# Patient Record
Sex: MALE | Race: WHITE | NOT HISPANIC OR LATINO | Employment: OTHER | URBAN - METROPOLITAN AREA
[De-identification: names, ages, dates, MRNs, and addresses within clinical notes are randomized per-mention and may not be internally consistent; named-entity substitution may affect disease eponyms.]

---

## 2017-03-26 ENCOUNTER — HOSPITAL ENCOUNTER (EMERGENCY)
Facility: HOSPITAL | Age: 72
Discharge: HOME | End: 2017-03-26
Attending: EMERGENCY MEDICINE | Admitting: EMERGENCY MEDICINE

## 2017-03-26 ENCOUNTER — APPOINTMENT (OUTPATIENT)
Dept: GENERAL RADIOLOGY | Facility: HOSPITAL | Age: 72
End: 2017-03-26

## 2017-03-26 VITALS
WEIGHT: 243.31 LBS | HEIGHT: 72 IN | RESPIRATION RATE: 22 BRPM | SYSTOLIC BLOOD PRESSURE: 164 MMHG | DIASTOLIC BLOOD PRESSURE: 106 MMHG | OXYGEN SATURATION: 100 % | TEMPERATURE: 98.3 F | HEART RATE: 69 BPM | BODY MASS INDEX: 32.95 KG/M2

## 2017-03-26 DIAGNOSIS — Z93.1 STATUS POST INSERTION OF PERCUTANEOUS ENDOSCOPIC GASTROSTOMY (PEG) TUBE (HCC): Primary | ICD-10-CM

## 2017-03-26 PROCEDURE — 74000 HC ABDOMEN KUB: CPT

## 2017-03-26 PROCEDURE — 99283 EMERGENCY DEPT VISIT LOW MDM: CPT

## 2017-03-26 PROCEDURE — 99283 EMERGENCY DEPT VISIT LOW MDM: CPT | Performed by: EMERGENCY MEDICINE

## 2017-03-26 RX ORDER — ALBUTEROL SULFATE 2.5 MG/3ML
2.5 SOLUTION RESPIRATORY (INHALATION)
COMMUNITY
End: 2018-08-29

## 2017-03-26 RX ORDER — LACTULOSE 10 G/15ML
20 SOLUTION ORAL DAILY PRN
COMMUNITY
End: 2019-08-16 | Stop reason: HOSPADM

## 2017-03-26 RX ORDER — ERGOCALCIFEROL (VITAMIN D2) 10 MCG
800 TABLET ORAL DAILY
COMMUNITY
End: 2018-08-29

## 2017-03-26 RX ORDER — ACETAMINOPHEN 500 MG
500 TABLET ORAL EVERY 4 HOURS PRN
COMMUNITY
End: 2019-08-16 | Stop reason: HOSPADM

## 2017-03-26 RX ORDER — AMIODARONE HYDROCHLORIDE 200 MG/1
100 TABLET ORAL 2 TIMES DAILY
COMMUNITY
End: 2018-08-29

## 2017-03-26 RX ORDER — AMOXICILLIN AND CLAVULANATE POTASSIUM 250; 125 MG/1; MG/1
1 TABLET, FILM COATED ORAL 3 TIMES DAILY
COMMUNITY
End: 2018-08-29

## 2017-03-26 RX ORDER — ATORVASTATIN CALCIUM 40 MG/1
40 TABLET, FILM COATED ORAL NIGHTLY
COMMUNITY
End: 2019-08-16 | Stop reason: HOSPADM

## 2017-03-26 RX ORDER — MESALAMINE 0.38 G/1
375 CAPSULE, EXTENDED RELEASE ORAL DAILY
COMMUNITY
End: 2018-08-29

## 2017-03-26 RX ORDER — IRON POLYSACCHARIDE COMPLEX 150 MG
150 CAPSULE ORAL DAILY
COMMUNITY
End: 2019-08-16 | Stop reason: HOSPADM

## 2017-03-27 NOTE — DISCHARGE INSTRUCTIONS
Should any further problems with the PEG to develop, take the patient back to [facility] were the PEG tube was placed originally.

## 2017-03-27 NOTE — ED PROVIDER NOTES
Subjective     History provided by:  Nursing home and medical records    History of Present Illness    · Chief complaint: Bleeding from the PEG tube site and concern if he takes tube is still in place    · Location: Left abdominal wall    · Quality/Severity: Bleeding from the insertion site of the PEG tube    · Timing/Onset: Today    · Modifying Factors: Unknown    · Associated symptoms: The patient is without complaint    · Narrative: The patient is a 72-year-old white male who is status post CVA and underwent PEG tube placement at [facility] March 23 by Dr. Hernandez.  He is at rehabilitation at the [facility] were he was noted to have bleeding from around the PEG tube site.  The nursing home was concerned as to whether the PEG tube was still in place.  The patient is elaborate much on the history and denies any pain.    ED Triage Vitals   Temp Heart Rate Resp BP SpO2   03/26/17 1759 03/26/17 1759 03/26/17 1759 03/26/17 1759 03/26/17 1759   98.3 °F (36.8 °C) 69 22 110/73 94 %      Temp src Heart Rate Source Patient Position BP Location FiO2 (%)   03/26/17 1759 03/26/17 1759 03/26/17 1759 03/26/17 1759 --   Oral Monitor Lying Right arm        Review of Systems   Reason unable to perform ROS: The patient is status post CVA and has expressive aphasia.       Past Medical History:   Diagnosis Date   • Acute kidney injury    • Aspiration pneumonia    • Atrial fibrillation    • Encephalopathy acute    • Leukocytosis    • LV dysfunction    • Septic shock    • Stroke    • Syncope        No Known Allergies    Past Surgical History:   Procedure Laterality Date   • CARDIAC SURGERY     • GTUBE INSERTION         History reviewed. No pertinent family history.    Social History     Social History   • Marital status: Single     Spouse name: N/A   • Number of children: N/A   • Years of education: N/A     Social History Main Topics   • Smoking status: Unknown If Ever Smoked   • Smokeless tobacco: None   •  Alcohol use No   • Drug use: No   • Sexual activity: Defer     Other Topics Concern   • None     Social History Narrative   • None           Objective   Physical Exam   Constitutional: He appears well-developed and well-nourished. No distress.   The patient appears in no acute distress and does not appear in any discomfort.   HENT:   Head: Normocephalic and atraumatic.   Right Ear: External ear normal.   Left Ear: External ear normal.   Nose: Nose normal.   Mouth/Throat: Oropharynx is clear and moist.   Eyes: Conjunctivae and EOM are normal. Pupils are equal, round, and reactive to light. No scleral icterus.   Neck: Normal range of motion. Neck supple.   Cardiovascular: Normal rate and regular rhythm.    No murmur heard.  Pulmonary/Chest: Effort normal and breath sounds normal. No respiratory distress.   Abdominal: Soft. Bowel sounds are normal. There is no tenderness.   There is dried blood around the PEG tube site and soaking the gauze around the PEG tube, but no active bleeding.  He has ecchymosis of the right side of the abdomen that appears subacute.  His abdomen is nontender in his bowel sounds are normal active.   Musculoskeletal: He exhibits no tenderness or deformity.   Lymphadenopathy:     He has no cervical adenopathy.   Neurological: He is alert.   Follows commands and attempts to answer questions with aphasia   Skin: Skin is warm and dry. No rash noted. He is not diaphoretic. No erythema.   Psychiatric: He has a normal mood and affect.   Nursing note and vitals reviewed.      Procedures         ED Course  ED Course   Comment By Time   Dried blood from the G-tube site was present but no active bleeding noted. Juan Pardo MD 03/26 2027                  MDM  Number of Diagnoses or Management Options  Status post insertion of percutaneous endoscopic gastrostomy (PEG) tube: new and requires workup     Amount and/or Complexity of Data Reviewed  Tests in the radiology section of CPT®: ordered and  reviewed  Independent visualization of images, tracings, or specimens: yes    Risk of Complications, Morbidity, and/or Mortality  Presenting problems: moderate  Diagnostic procedures: moderate  Management options: moderate    Patient Progress  Patient progress: improved      Final diagnoses:   Status post insertion of percutaneous endoscopic gastrostomy (PEG) tube           Labs Reviewed - No data to display  XR Abdomen KUB   ED Interpretation   Gastrografin in stomach.  G-tube in proper position.  Per my   interpretation and Dr. Reyez radiologist             Medication List      Notice     No changes were made to your prescriptions during this visit.             Juan Pardo MD  03/27/17 0019

## 2017-03-27 NOTE — ED NOTES
Pt g-tube site cleaned no new blood noted. 4x4 dressing applied. Upon discharge no blding from G-tube neva Jimenez RN  03/26/17 1319

## 2017-04-21 ENCOUNTER — LAB REQUISITION (OUTPATIENT)
Dept: LAB | Facility: HOSPITAL | Age: 72
End: 2017-04-21

## 2017-04-21 DIAGNOSIS — D64.9 ANEMIA: ICD-10-CM

## 2017-04-21 LAB
BASOPHILS # BLD AUTO: 0.05 10*3/MM3 (ref 0–0.2)
BASOPHILS NFR BLD AUTO: 0.4 % (ref 0–2)
DEPRECATED RDW RBC AUTO: 58.9 FL (ref 37–54)
EOSINOPHIL # BLD AUTO: 0.05 10*3/MM3 (ref 0.1–0.3)
EOSINOPHIL NFR BLD AUTO: 0.4 % (ref 0–4)
ERYTHROCYTE [DISTWIDTH] IN BLOOD BY AUTOMATED COUNT: 16.8 % (ref 11.5–14.5)
HCT VFR BLD AUTO: 32.5 % (ref 42–52)
HGB BLD-MCNC: 10.3 G/DL (ref 14–18)
IMM GRANULOCYTES # BLD: 0.1 10*3/MM3 (ref 0–0.03)
IMM GRANULOCYTES NFR BLD: 0.7 % (ref 0–0.5)
LYMPHOCYTES # BLD AUTO: 2.21 10*3/MM3 (ref 0.6–4.8)
LYMPHOCYTES NFR BLD AUTO: 16.5 % (ref 20–45)
MCH RBC QN AUTO: 30.9 PG (ref 27–31)
MCHC RBC AUTO-ENTMCNC: 31.7 G/DL (ref 31–37)
MCV RBC AUTO: 97.6 FL (ref 80–94)
MONOCYTES # BLD AUTO: 0.81 10*3/MM3 (ref 0–1)
MONOCYTES NFR BLD AUTO: 6 % (ref 3–8)
NEUTROPHILS # BLD AUTO: 10.18 10*3/MM3 (ref 1.5–8.3)
NEUTROPHILS NFR BLD AUTO: 76 % (ref 45–70)
NRBC BLD MANUAL-RTO: 0 /100 WBC (ref 0–0)
PLATELET # BLD AUTO: 334 10*3/MM3 (ref 140–500)
PMV BLD AUTO: 11.3 FL (ref 7.4–10.4)
RBC # BLD AUTO: 3.33 10*6/MM3 (ref 4.7–6.1)
WBC NRBC COR # BLD: 13.4 10*3/MM3 (ref 4.8–10.8)

## 2017-04-21 PROCEDURE — 85025 COMPLETE CBC W/AUTO DIFF WBC: CPT | Performed by: INTERNAL MEDICINE

## 2017-05-25 ENCOUNTER — HOSPITAL ENCOUNTER (EMERGENCY)
Facility: HOSPITAL | Age: 72
Discharge: HOME | End: 2017-05-25
Attending: EMERGENCY MEDICINE | Admitting: EMERGENCY MEDICINE

## 2017-05-25 VITALS
SYSTOLIC BLOOD PRESSURE: 156 MMHG | HEART RATE: 96 BPM | WEIGHT: 227 LBS | HEIGHT: 72 IN | RESPIRATION RATE: 16 BRPM | TEMPERATURE: 98.8 F | BODY MASS INDEX: 30.75 KG/M2 | DIASTOLIC BLOOD PRESSURE: 84 MMHG | OXYGEN SATURATION: 95 %

## 2017-05-25 DIAGNOSIS — D64.9 ANEMIA, UNSPECIFIED TYPE: Primary | ICD-10-CM

## 2017-05-25 LAB
ALBUMIN SERPL-MCNC: 3.7 G/DL (ref 3.5–5.2)
ALBUMIN/GLOB SERPL: 1.2 G/DL
ALP SERPL-CCNC: 81 U/L (ref 40–129)
ALT SERPL W P-5'-P-CCNC: 22 U/L (ref 5–41)
ANION GAP SERPL CALCULATED.3IONS-SCNC: 11.7 MMOL/L
APTT PPP: 27.9 SECONDS (ref 24.3–38.1)
AST SERPL-CCNC: 19 U/L (ref 5–40)
BASOPHILS # BLD AUTO: 0.04 10*3/MM3 (ref 0–0.2)
BASOPHILS NFR BLD AUTO: 0.3 % (ref 0–2)
BILIRUB SERPL-MCNC: 0.3 MG/DL (ref 0.2–1.2)
BUN BLD-MCNC: 20 MG/DL (ref 8–23)
BUN/CREAT SERPL: 21.5 (ref 7–25)
CALCIUM SPEC-SCNC: 8.7 MG/DL (ref 8.8–10.5)
CHLORIDE SERPL-SCNC: 94 MMOL/L (ref 98–107)
CO2 SERPL-SCNC: 24.3 MMOL/L (ref 22–29)
CREAT BLD-MCNC: 0.93 MG/DL (ref 0.76–1.27)
DEPRECATED RDW RBC AUTO: 50.4 FL (ref 37–54)
EOSINOPHIL # BLD AUTO: 0.05 10*3/MM3 (ref 0.1–0.3)
EOSINOPHIL NFR BLD AUTO: 0.4 % (ref 0–4)
ERYTHROCYTE [DISTWIDTH] IN BLOOD BY AUTOMATED COUNT: 15.1 % (ref 11.5–14.5)
GFR SERPL CREATININE-BSD FRML MDRD: 80 ML/MIN/1.73
GLOBULIN UR ELPH-MCNC: 3.1 GM/DL
GLUCOSE BLD-MCNC: 114 MG/DL (ref 65–99)
HCT VFR BLD AUTO: 26 % (ref 42–52)
HGB BLD-MCNC: 8.1 G/DL (ref 14–18)
IMM GRANULOCYTES # BLD: 0.08 10*3/MM3 (ref 0–0.03)
IMM GRANULOCYTES NFR BLD: 0.6 % (ref 0–0.5)
INR PPP: 1.09 (ref 0.9–1.1)
LYMPHOCYTES # BLD AUTO: 1.07 10*3/MM3 (ref 0.6–4.8)
LYMPHOCYTES NFR BLD AUTO: 7.5 % (ref 20–45)
MCH RBC QN AUTO: 28.6 PG (ref 27–31)
MCHC RBC AUTO-ENTMCNC: 31.2 G/DL (ref 31–37)
MCV RBC AUTO: 91.9 FL (ref 80–94)
MONOCYTES # BLD AUTO: 1.49 10*3/MM3 (ref 0–1)
MONOCYTES NFR BLD AUTO: 10.5 % (ref 3–8)
NEUTROPHILS # BLD AUTO: 11.52 10*3/MM3 (ref 1.5–8.3)
NEUTROPHILS NFR BLD AUTO: 80.7 % (ref 45–70)
NRBC BLD MANUAL-RTO: 0.1 /100 WBC (ref 0–0)
PLATELET # BLD AUTO: 459 10*3/MM3 (ref 140–500)
PMV BLD AUTO: 10.8 FL (ref 7.4–10.4)
POTASSIUM BLD-SCNC: 4.1 MMOL/L (ref 3.5–5.2)
PROT SERPL-MCNC: 6.8 G/DL (ref 6–8.5)
PROTHROMBIN TIME: 14.1 SECONDS (ref 12.1–15)
RBC # BLD AUTO: 2.83 10*6/MM3 (ref 4.7–6.1)
SODIUM BLD-SCNC: 130 MMOL/L (ref 136–145)
WBC NRBC COR # BLD: 14.25 10*3/MM3 (ref 4.8–10.8)

## 2017-05-25 PROCEDURE — 99284 EMERGENCY DEPT VISIT MOD MDM: CPT

## 2017-05-25 PROCEDURE — 85610 PROTHROMBIN TIME: CPT | Performed by: EMERGENCY MEDICINE

## 2017-05-25 PROCEDURE — 99282 EMERGENCY DEPT VISIT SF MDM: CPT | Performed by: EMERGENCY MEDICINE

## 2017-05-25 PROCEDURE — 80053 COMPREHEN METABOLIC PANEL: CPT | Performed by: EMERGENCY MEDICINE

## 2017-05-25 PROCEDURE — 85025 COMPLETE CBC W/AUTO DIFF WBC: CPT | Performed by: EMERGENCY MEDICINE

## 2017-05-25 PROCEDURE — 85730 THROMBOPLASTIN TIME PARTIAL: CPT | Performed by: EMERGENCY MEDICINE

## 2017-05-25 RX ORDER — DILTIAZEM HYDROCHLORIDE 60 MG/1
60 TABLET, FILM COATED ORAL 3 TIMES DAILY
COMMUNITY
End: 2019-08-16 | Stop reason: HOSPADM

## 2017-05-25 RX ORDER — ESCITALOPRAM OXALATE 20 MG/1
20 TABLET ORAL DAILY
COMMUNITY
End: 2018-08-29

## 2017-05-25 RX ORDER — ASPIRIN 81 MG/1
81 TABLET, CHEWABLE ORAL DAILY
COMMUNITY
End: 2018-08-29

## 2017-05-25 RX ORDER — SODIUM CHLORIDE 0.9 % (FLUSH) 0.9 %
10 SYRINGE (ML) INJECTION AS NEEDED
Status: DISCONTINUED | OUTPATIENT
Start: 2017-05-25 | End: 2017-05-25 | Stop reason: HOSPADM

## 2017-05-25 RX ORDER — ESOMEPRAZOLE MAGNESIUM 40 MG/1
40 FOR SUSPENSION ORAL
COMMUNITY
End: 2018-08-29

## 2017-07-31 ENCOUNTER — TRANSCRIBE ORDERS (OUTPATIENT)
Dept: ADMINISTRATIVE | Facility: HOSPITAL | Age: 72
End: 2017-07-31

## 2017-07-31 DIAGNOSIS — R13.10 DYSPHAGIA, UNSPECIFIED TYPE: Primary | ICD-10-CM

## 2017-08-03 ENCOUNTER — HOSPITAL ENCOUNTER (OUTPATIENT)
Dept: GENERAL RADIOLOGY | Facility: HOSPITAL | Age: 72
Discharge: HOME | End: 2017-08-03
Attending: INTERNAL MEDICINE | Admitting: INTERNAL MEDICINE

## 2017-08-03 DIAGNOSIS — R13.10 DYSPHAGIA, UNSPECIFIED TYPE: Primary | ICD-10-CM

## 2017-08-03 PROCEDURE — 92611 MOTION FLUOROSCOPY/SWALLOW: CPT

## 2017-08-03 PROCEDURE — G8997 SWALLOW GOAL STATUS: HCPCS

## 2017-08-03 PROCEDURE — G8998 SWALLOW D/C STATUS: HCPCS

## 2017-08-03 PROCEDURE — 74230 X-RAY XM SWLNG FUNCJ C+: CPT

## 2017-08-03 PROCEDURE — G8996 SWALLOW CURRENT STATUS: HCPCS

## 2017-08-03 NOTE — MBS/VFSS/FEES
Outpatient Speech Language Pathology   Adult Swallow Initial Evaluation  KIEL Carver     Patient Name: Edson Myers  : 1945  MRN: [id number]  Today's Date: 8/3/2017         Visit Date: 2017   There is no problem list on file for this patient.       Past Medical History:   Diagnosis Date   • Acute kidney injury    • Aspiration pneumonia    • Atrial fibrillation    • Encephalopathy acute    • Leukocytosis    • LV dysfunction    • Septic shock    • Stroke    • Syncope         Past Surgical History:   Procedure Laterality Date   • CARDIAC SURGERY     • GTUBE INSERTION           Visit Dx:     ICD-10-CM ICD-9-CM   1. Dysphagia, unspecified type R13.10 787.20             Adult Dysphagia - 17 0918     Adult Dysphagia Background    Patient Description of Complaint Patient states he would like to get his feeding tube removed.  -AD    Date of Onset 2017  -AD    Symptoms Reported by Patient Other (comment)   Patient does not report any symptoms  -AD    Patient Report of Functional Impact Patient would like to be able to eat food and maintain his nutrition via oral route.  -AD    List Pertinent Medications Atorvastatin, Diltiazem, Donepezil, Escitalopram, Iferex, Nexium UD, Oyster Neil-Vit D, Sulfasalazine, Biscodyl, Calcium antacid, lactulose, Promod, albuterol sulfate.   -AD    History Pertinent to Diagnosis Patient reports that he fell and passed out and was hospitalized. Reports a history of Polio that is causing his swallowing problems and has a feeding tube that he would like to have removed. Patient is noted to be a poor historian and history was obtained from his chart. He was hospitalized with aspiration pneumonia and underwent an MBS that showed severe dysphagia with poor pharyngeal clearance and aspiration/penetration due to poor clearance. Feeding tube was recommended and a PEG tube was placed as the patient did not leave a Dobhoff tube in place. Some concerns with right tracheal  "deviation due to intrinsic pressure from the aorta. This was further evaluated per Dr. Capellan, ENT and here is his assessment \" Assessment/Plan: 1. Dysphagia, evaluated with Xiomara BROOKS, noted to have substantial retention of secretions 2. Consider PEG tube 3. Do not think the innominate vessel near the trachea contributes to this, nor would I recommend any intervention in this area, may wish to get another opinion from vascular surgery regarding this but this is likely long standing and would be a poor idea to intervene in this area. 4. Could consider CP injection of Botox, however would first do extensive swallowing therapy and see how he did, I suspect a general anesthetic would be risky in this patient. Andrade Capellan MD\" Pt also with history significant for chronic left facial droop, Left frontal CVA with encephalomalacia, CABG, GERD, and polio. Pt is a resident of [facility] and has participated in dysphagia therapy and repeat MBS is recommended to assess for tolerance of a po diet.   -AD    Non-Oral Feeding Gastrostomy Tube  -AD    VFSS Exam    Study Completed By SLP and Radiologist (comment)   Dr. Gan  -AD    Textures Presented Thin;Nectar;Honey;Pudding;Solid  -AD    Position During Study/Views Obtained Upright;Lateral View   Sitting in video imaging chair  -AD    Physiologic Impairments Noted on VFSS    Physiologic Impairments Noted on VFSS Did not assess esophageal phase;X  -AD    Oral Control/Manipulation Difficulty Some decreased oral control of thins noted with decreased back of tongue control resulting in premature spill of thins to the valleculae and pyriform sinuses prior to the initiation of the swallow. Some premature spill of all other consistencies tested to the level of the valleculae prior to the pharyngeal trigger.  -AD    Reduced Tongue Base Retraction Mildly decreased and noted on all consistencies.   -AD    Mistiming Noted on thins from cup drink on " one trial.   -AD    Increased Stage Transition Duration (Delay) Significant delay of pharyngoesophageal transit. Decreased UES opening and noted prominent cricopharyngeus muscle, primarily noted on solid cracker.   -AD    Reduced Laryngeal Elevation Mildly reduced but intermittent.   -AD    Reduced Pharyngeal Stripping Wave Noted on secondary swallows to clear pharyngeal residue.  -AD    Reduced UES Opening Significantly decreased UES opening resulting in pharyngeal residue in the pyriform sinuses.   -AD    Symptoms    Aspiration X  -AD    Aspiration Before With these consistencies (comment);With this reaction (comment)   Thins; silent in nature; able to clear w/cued cough.  -AD    Residue Noted X  -AD    Valleculae With these consistencies (comment);With this reaction (comment)   Mild w/thins, but able to clear w/repeat swallow.  -AD    Pharyngeal Walls With these consistencies (comment);With this reaction (comment)   Honey and Puree; clears with repeat swallows  -AD    Pyriform Sinuses With these consistencies (comment);With this reaction (comment)   Mod-Sev; on all consistencies; clears mostly w/repeat swallo  -AD    Compensatory Strategies X  -AD    Compensatory Strategies Used Cued cough clears aspirated material from the trachea and vestibule. Patient was able to utilize multiple swallows both spontaneously and with cues to mostly clear pharyngeal residue. Pt was able to partially perform a super supraglottic swallow with verbal cues which appeared to eliminate penetration/aspiration. This is not definitive due to only one episode of aspiration noted.   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   -AD    Clinical Impression: Swallowing Mild-Moderate:;oropharyngeal phase dysphagia  -AD    Prognosis Comment Good due to improvement noted from prior MBS results.   -AD    Impact on Function risk for aspiration;risk for pneumonia  -AD    Recommendations for Diet/Nutirition full oral diet   start diet of mechanical soft whole foods, no mixed, nectar  -AD    Swallowing Precautions reduce distractions;upright position for at least 30 minutes after meals;small sips and bites when eating;slow rate empty mouth between bites;super-supraglottic swallow;multiple swallows per bite or sip;effortful swallow;medication administration (comment)  -AD    Medication Administration (Comment) whole in puree  -AD    Recommendations dysphagia therapy to address swallowing deficits;repeat instrumental (comment)   repeat instrumental as clinically indicated  -AD    Frequency per SLP at facility  -AD    Other Recommended Referrals ENT   further assessment of cricopharyngeal bar for intervention  -AD      User Key  (r) = Recorded By, (t) = Taken By, (c) = Cosigned By    Initials Name Provider Type    AD Milana Avelar MS [facility]-SLP Speech Therapist                OP SLP Education       08/03/17 1621    Education    Barriers to Learning Other (comment0   Cognitive barriers  -AD    Action Taken to Address Barriers Provided information to nursing facility staff and son.   -AD    Education Provided Described results of evaluation;Family/caregivers expressed understanding of evaluation;Family/caregivers require further education on strategies, risks  -AD    Assessed Learning needs;Learning motivation;Learning preferences;Learning readiness  -AD    Learning Motivation Moderate  -AD    Learning Method Explanation;Demonstration  -AD    Teaching Response Verbalized understanding;Reinforcement needed  -AD       User Key  (r) = Recorded By, (t) = Taken By, (c) = Cosigned By    Initials Name Effective Dates    ERIC Avelar MS CCC-SLP 06/22/16 -                 OP SLP Assessment/Plan - 08/03/17 1617     SLP Assessment    Functional Problems Swallowing  -AD    Impact on Function: Swallowing Risk of aspiration;Risk of pneumonia  -AD    Clinical Impression: Swallowing Mild-Moderate:;oropharyngeal phase dysphagia  -AD    Prognosis Good (comment)   With continued intervention and cues/use of strategies and techniques.  -AD    Patient would benefit from skilled therapy intervention Yes  -AD    SLP Plan    Frequency evaluation only; therapy recommended to continue at the facility  -AD    Planned CPT's? SLP MBS: [id number]  -AD      User Key  (r) = Recorded By, (t) = Taken By, (c) = Cosigned By    Initials Name Provider Type    ERIC Avelar MS CCC-SLP Speech Therapist          Time Calculation:   SLP Start Time: 0830  SLP Stop Time: 0918  SLP Time Calculation (min): 48 min  SLP Non-Billable Time (min): 0 min  Total Timed Code Minutes- SLP: 0 minute(s)    Therapy Charges for Today     Code Description Service Date Service Provider Modifiers Qty    13708307982 HC ST SWALLOWING CURRENT STATUS 8/3/2017 Milana Avelar MS CCC-SLP GN, CK 1    04474041151 HC ST SWALLOWING PROJECTED 8/3/2017 Milana Avelar MS CCC-SLP GN, CK 1    70822789124 HC ST SWALLOWING DISCHARGE 8/3/2017 Milana Avelar MS CCC-SLP GN, CK 1    [id number] HC ST MOTION FLUORO EVAL SWALLOW 3 8/3/2017 Milana Avelar MS CCC-SLP GN 1          SLP G-Codes  SLP NOMS Used?: Yes  Functional Limitations: Swallowing  Swallow Current Status (): At least 40 percent but less than 60 percent impaired, limited or restricted  Swallow Goal Status (): At least 40 percent but less than 60 percent impaired, limited or restricted  Swallow Discharge Status (): At least 40 percent but less than 60 percent impaired, limited or  restricted        Milana Avelar, MS CCC-SLP  8/3/2017

## 2017-08-07 ENCOUNTER — OFFICE (OUTPATIENT)
Dept: URBAN - METROPOLITAN AREA CLINIC 71 | Facility: CLINIC | Age: 72
End: 2017-08-07
Payer: MEDICAID

## 2017-08-07 VITALS — SYSTOLIC BLOOD PRESSURE: 126 MMHG | WEIGHT: 174 LBS | HEART RATE: 80 BPM | DIASTOLIC BLOOD PRESSURE: 80 MMHG

## 2017-08-07 DIAGNOSIS — I63.9 CEREBRAL INFARCTION, UNSPECIFIED: ICD-10-CM

## 2017-08-07 DIAGNOSIS — R13.12 DYSPHAGIA, OROPHARYNGEAL PHASE: ICD-10-CM

## 2017-08-07 PROCEDURE — 99202 OFFICE O/P NEW SF 15 MIN: CPT

## 2017-10-17 ENCOUNTER — TRANSCRIBE ORDERS (OUTPATIENT)
Dept: ADMINISTRATIVE | Facility: HOSPITAL | Age: 72
End: 2017-10-17

## 2017-10-17 DIAGNOSIS — R13.10 DYSPHAGIA, UNSPECIFIED TYPE: Primary | ICD-10-CM

## 2017-10-23 ENCOUNTER — HOSPITAL ENCOUNTER (OUTPATIENT)
Dept: GENERAL RADIOLOGY | Facility: HOSPITAL | Age: 72
Discharge: HOME | End: 2017-10-23
Attending: INTERNAL MEDICINE | Admitting: INTERNAL MEDICINE

## 2017-10-23 DIAGNOSIS — R13.10 DYSPHAGIA, UNSPECIFIED TYPE: Primary | ICD-10-CM

## 2017-10-23 PROCEDURE — 74230 X-RAY XM SWLNG FUNCJ C+: CPT

## 2017-10-23 PROCEDURE — G8997 SWALLOW GOAL STATUS: HCPCS

## 2017-10-23 PROCEDURE — G8998 SWALLOW D/C STATUS: HCPCS

## 2017-10-23 PROCEDURE — 92611 MOTION FLUOROSCOPY/SWALLOW: CPT

## 2017-10-23 PROCEDURE — G8996 SWALLOW CURRENT STATUS: HCPCS

## 2017-10-23 NOTE — MBS/VFSS/FEES
Outpatient Speech Language Pathology   Adult Swallow Re-Evaluation   Modified Barium Swallow Study  KIEL Carver     Patient Name: Edson Myers  : 1945  MRN: [id number]  Today's Date: 10/23/2017         Visit Date: 10/23/2017   There is no problem list on file for this patient.       Past Medical History:   Diagnosis Date   • Acute kidney injury    • Aspiration pneumonia    • Atrial fibrillation    • Encephalopathy acute    • Leukocytosis    • LV dysfunction    • Septic shock    • Stroke    • Syncope         Past Surgical History:   Procedure Laterality Date   • CARDIAC SURGERY     • GTUBE INSERTION           Visit Dx:     ICD-10-CM ICD-9-CM   1. Dysphagia, unspecified type R13.10 787.20           Adult Dysphagia - 10/23/17 1124     Adult Dysphagia Background    Patient Description of Complaint Pt does not have any complaints. Per order, staff wanting to see if patient can have his liquids upgraded.   -AD    Date of Onset 2017  -AD    Symptoms Reported by Patient Other (comment)   Pt does not report any. Prior hx silent aspiration thins.  -AD    Patient Report of Functional Impact Pt does not report any impact. Per notes, impact for decreased hydration and risk of aspiration and aspiration pneumonia.  -AD    List Pertinent Medications escitalopram, Namenda, atorvastatin, Diltiazem, divalproex sprinkles, donepezil, niferex, nexium, Os-Neil+D, azulfidine, omeprazole, promod, bisacodyl, Tums, lactulose  -AD    For Additional Medications Please see list scanned into chart.   for amounts and administration  -AD    History Pertinent to Diagnosis Patient was seen for a modified barium swallow study on 8/3/17 to see if he could advance to a po diet in addition to his current tube feedings. He demonstrated some decreased oral control greatest on thins, decreased tongue base retraction, significant delay of  pharyngoesophageal transit and decreased UES opening with prominent cricipharyngeus muscle.  Significant pyriform sinus residue noted on all consistencies, decreased pharyngeal stripping wave. One episode of silent aspiration of thins from a cup that he was able to clear with a cued cough. Pharyngeal residue that was clear with repeat swallows. A mechanical soft diet with no mixed consistencies and nectar thick liquids was recommended. Per Trina @ [place], pt does not use his Gtube for nutrtion but only for water flushes at this time. 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 Pt is a [facility] of [facility].   -AD    Current Diet (Solids) Regular  -AD    Diet Level (Liquids) Honey Thick Liquid   per patient he drinks thins, poor historian.  -AD    Oral Mech    Respiratory/Swallow Coordination Pattern WFL  -AD    Respiratory/Swallow Coordination Pattern Details Vwrlud-Ldbvzls-Olsebw  -AD    Position During Evaluation Upright  -AD    Anatomy/Physiology WNL Patient demonstrates anatomy and physiology that is WNL  -AD    Dentition Patient is edentulous   no dentures present  -AD    Oral Health Oral cavity is clean  -AD    Awareness/Control of Secretions Patient wipes mouth   on left side  -AD    VFSS Exam    Study Completed By SLP and Radiologist (comment)   Dr. Gan  -AD    Textures Presented Thin;Nectar;Honey;Pudding;Solid  -AD    Position During Study/Views Obtained Upright;Lateral View   standing  -AD    Physiologic Impairments Noted on VFSS    Physiologic Impairments Noted on VFSS X  -AD    Oral Control/Manipulation Difficulty Pt with improved oral control from prior MBS with less spill noted. Continues to demonstrate some premature spill of thins to the valleculae by spoon and spill to the valleculae and pyriform sinuses by cup but not straw.   -AD    Reduced Tongue Base Retraction Appears WFL at this time.  -AD    Mistiming Not noted during this MBS on  any trials.   -AD    Reduced Pharyngeal Stripping Wave Noted on trials of thins from straw with trace residue noted on the posterior pharyngeal wall near the pyriforms and pharyngoesophageal segement.   -AD    Reduced Closure at Entrance to Airway Appears to demonstrate good closure and protection of the airway.   -AD    Reduced UES Opening Significantly improved from the last MBS in August 2017. Some mild impedance due to C5-6 osteophytes and continued decreased clearance of all material through the PE segment resulting in trace to moderate residue in the pyriform sinuses  -AD    Symptoms    Aspiration None noted  -AD    Residue Noted X  -AD    Pharyngeal Walls With these consistencies (comment);With this reaction (comment)   thins from straw, clears with second swallow; trace amt  -AD    Pyriform Sinuses With these consistencies (comment);With this reaction (comment)   all consistencies in the pyriforms; triggers second swallow  -AD    Compensatory Strategies X  -AD    Compensatory Strategies Used Chin tuck was attempted to see if it would clear pyriform sinus residue. It was not effective. Pt with spontaneous use of multiple swallows to clear all residue from the pharynx. This was consistently effective on all trials. Pt also noted to perform an effortful swallow on one clearing swallow with good clearance of thins from the pyriforms.   -AD    Esophageal Phase    Per Radiology, the esophageal phase: Did not assess  -AD    SLP Summary    Severity Level of Dysphagia mild dysphagia;oral dysfunction;pharyngeal dysfunction  -AD    Consistencies Aspirated/Penetrated There was some question of penetration as a darkened area was noted on the anterior surface of the epiglottis. Upon review of the DVD, this darkened area was present on the anterior surface of the epiglottis prior to any administration of contrast.  Level 1 on all consistencies on the penetration aspiration scale.-AD  none  -AD    Summary Statement Pt  presents with a mild oropharyngeal dysphagia with continued decreased oral control and pyriform sinus residue. No penetration or aspiration was noted on any consistency given or differing bolus administration and size. Significant improvement overall noted from prior study. Rec to advance diet to regular with thins with intermittent supervision and continued exercises for back of tongue/tongue base and UES opening.   -AD    Results/Recs/Barriers/Education for Dysphagia    Factors Affecting Performance no difficulty participating in study;other   some continued concerns with cognition noted.   -AD    Learning Motivation moderate  -AD    Education/Learning Comments Decreased recall of events; pt not accurate on current diet level and liquid consistencies. Difficulty with history noted on last exam.   -AD    Clinical Impression: Swallowing Mild:;oropharyngeal phase dysphagia  -AD    Prognosis Comment Good for return to regular diet with thins with use of multiple swallows and efforful swallow.   -AD    Impact on Function risk for aspiration   minimal to mild  -AD    Recommendations for Diet/Nutirition full oral diet   Regular with thin liquids. Rec small sips, one at a time.  -AD    Swallowing Precautions reduce distractions;upright position for at least 30 minutes after meals;small sips and bites when eating;slow rate empty mouth between bites;multiple swallows per bite or sip;effortful swallow;medication administration (comment)  -AD    Medication Administration (Comment) whole with thins  -AD    Recommendations continue therapy to address swallowing deficits;other   for follow up meal assessment; per facility SLP recomm.  -AD      User Key  (r) = Recorded By, (t) = Taken By, (c) = Cosigned By    Initials Name Provider Type    AD Milana Avelar MS [facility]-SLP Speech Therapist              OP SLP Education       10/23/17 1124    Education    Barriers to Learning Other (comment0   Cognitive deficits  -AD    Action  Taken to Address Barriers Written information provided to the facility.  -AD    Education Provided Described results of evaluation;Patient expressed understanding of evaluation;Patient requires further education on strategies, risks  -AD    Assessed Learning needs;Learning motivation;Learning preferences;Learning readiness  -AD    Learning Motivation Moderate  -AD    Learning Method Explanation;Demonstration;Teach back  -AD    Teaching Response Verbalized understanding;Reinforcement needed  -AD      User Key  (r) = Recorded By, (t) = Taken By, (c) = Cosigned By    Initials Name Effective Dates    AD Milana Avelar MS CCC-SLP 06/22/16 -               OP SLP Assessment/Plan - 10/23/17 1124     SLP Assessment    Functional Problems Swallowing  -AD    Clinical Impression: Swallowing Mild:;oropharyngeal phase dysphagia  -AD    Clinical Impression Comments Significant improvement from prior study in August 2017  -AD    SLP Diagnosis Mild oropharyngeal dysphagia  -AD    Prognosis Good (comment)   with use of strategies and intermittent supervision.  -AD    Patient would benefit from skilled therapy intervention Yes  -AD    SLP Plan    Frequency evaluation only  -AD    Planned CPT's? SLP MBS: 82041  -AD    Plan Comments Further therapy per facility SLP. Recommend follow up meal assessment if diet advanced to include thin liquids.   -AD      User Key  (r) = Recorded By, (t) = Taken By, (c) = Cosigned By    Initials Name Provider Type    ERIC Avelar MS CCC-SLP Speech Therapist        Time Calculation:   SLP Start Time: 1040  SLP Stop Time: 1124  SLP Time Calculation (min): 44 min  SLP Non-Billable Time (min): 0 min  Total Timed Code Minutes- SLP: 0 minute(s)    Therapy Charges for Today     Code Description Service Date Service Provider Modifiers Qty    03542316371 HC ST SWALLOWING CURRENT STATUS 10/23/2017 Milana Avelar MS CCC-SLP GN, CI 1    07216251898 HC ST SWALLOWING PROJECTED 10/23/2017 Milana ESPINOZA  MS Rosio CCC-SLP GN, CI 1    97248865527 HC ST SWALLOWING DISCHARGE 10/23/2017 Milana Avelar MS CCC-SLP GN, CI 1    00027052386 HC ST MOTION FLUORO EVAL SWALLOW 3 10/23/2017 Milana Avelar MS CCC-SLP GN 1          SLP G-Codes  SLP NOMS Used?: Yes - NOMS, Swallowing Level 6  Functional Limitations: Swallowing  Swallow Current Status (): At least 1 percent but less than 20 percent impaired, limited or restricted  Swallow Goal Status (): At least 1 percent but less than 20 percent impaired, limited or restricted  Swallow Discharge Status (): At least 1 percent but less than 20 percent impaired, limited or restricted        Milana Avelar MS CCC-SLP  10/23/2017

## 2017-12-11 ENCOUNTER — OFFICE (OUTPATIENT)
Dept: URBAN - METROPOLITAN AREA CLINIC 71 | Facility: CLINIC | Age: 72
End: 2017-12-11
Payer: MEDICAID

## 2017-12-11 VITALS — HEART RATE: 84 BPM | SYSTOLIC BLOOD PRESSURE: 160 MMHG | WEIGHT: 146 LBS | DIASTOLIC BLOOD PRESSURE: 80 MMHG

## 2017-12-11 DIAGNOSIS — I63.9 CEREBRAL INFARCTION, UNSPECIFIED: ICD-10-CM

## 2017-12-11 DIAGNOSIS — R13.12 DYSPHAGIA, OROPHARYNGEAL PHASE: ICD-10-CM

## 2017-12-11 PROCEDURE — 99213 OFFICE O/P EST LOW 20 MIN: CPT

## 2017-12-18 ENCOUNTER — OFFICE (OUTPATIENT)
Dept: URBAN - METROPOLITAN AREA CLINIC 71 | Facility: CLINIC | Age: 72
End: 2017-12-18
Payer: MEDICAID

## 2017-12-18 VITALS — SYSTOLIC BLOOD PRESSURE: 130 MMHG | DIASTOLIC BLOOD PRESSURE: 68 MMHG | HEART RATE: 96 BPM

## 2017-12-18 DIAGNOSIS — R13.12 DYSPHAGIA, OROPHARYNGEAL PHASE: ICD-10-CM

## 2017-12-18 DIAGNOSIS — I63.9 CEREBRAL INFARCTION, UNSPECIFIED: ICD-10-CM

## 2017-12-18 PROCEDURE — 99212 OFFICE O/P EST SF 10 MIN: CPT

## 2018-08-29 ENCOUNTER — APPOINTMENT (OUTPATIENT)
Dept: GENERAL RADIOLOGY | Facility: HOSPITAL | Age: 73
End: 2018-08-29

## 2018-08-29 ENCOUNTER — TRANSCRIBE ORDERS (OUTPATIENT)
Dept: ADMINISTRATIVE | Facility: HOSPITAL | Age: 73
End: 2018-08-29

## 2018-08-29 ENCOUNTER — APPOINTMENT (OUTPATIENT)
Dept: CT IMAGING | Facility: HOSPITAL | Age: 73
End: 2018-08-29
Attending: EMERGENCY MEDICINE

## 2018-08-29 ENCOUNTER — HOSPITAL ENCOUNTER (EMERGENCY)
Facility: HOSPITAL | Age: 73
Discharge: HOME | End: 2018-08-29
Attending: EMERGENCY MEDICINE | Admitting: EMERGENCY MEDICINE

## 2018-08-29 VITALS
DIASTOLIC BLOOD PRESSURE: 67 MMHG | WEIGHT: 258 LBS | OXYGEN SATURATION: 98 % | HEIGHT: 72 IN | TEMPERATURE: 97.8 F | RESPIRATION RATE: 16 BRPM | SYSTOLIC BLOOD PRESSURE: 121 MMHG | HEART RATE: 87 BPM | BODY MASS INDEX: 34.95 KG/M2

## 2018-08-29 DIAGNOSIS — I45.9 CONDUCTION DISORDER OF THE HEART: ICD-10-CM

## 2018-08-29 DIAGNOSIS — R55 SYNCOPE, UNSPECIFIED SYNCOPE TYPE: Primary | ICD-10-CM

## 2018-08-29 DIAGNOSIS — R10.84 GENERALIZED ABDOMINAL CRAMPS: ICD-10-CM

## 2018-08-29 DIAGNOSIS — G45.9 MINI STROKE: Primary | ICD-10-CM

## 2018-08-29 LAB
ALBUMIN SERPL-MCNC: 3.8 G/DL (ref 3.5–5.2)
ALBUMIN/GLOB SERPL: 1.2 G/DL
ALP SERPL-CCNC: 77 U/L (ref 40–129)
ALT SERPL W P-5'-P-CCNC: 17 U/L (ref 5–41)
AMPHET+METHAMPHET UR QL: NEGATIVE
AMPHETAMINES UR QL: NEGATIVE
ANION GAP SERPL CALCULATED.3IONS-SCNC: 18.8 MMOL/L
AST SERPL-CCNC: 17 U/L (ref 5–40)
BACTERIA UR QL AUTO: ABNORMAL /HPF
BARBITURATES UR QL SCN: NEGATIVE
BASOPHILS # BLD AUTO: 0.07 10*3/MM3 (ref 0–0.2)
BASOPHILS NFR BLD AUTO: 0.4 % (ref 0–2)
BENZODIAZ UR QL SCN: NEGATIVE
BILIRUB SERPL-MCNC: 0.3 MG/DL (ref 0.2–1.2)
BILIRUB UR QL STRIP: NEGATIVE
BUN BLD-MCNC: 19 MG/DL (ref 8–23)
BUN/CREAT SERPL: 13.2 (ref 7–25)
BUPRENORPHINE SERPL-MCNC: NEGATIVE NG/ML
CALCIUM SPEC-SCNC: 9 MG/DL (ref 8.8–10.5)
CANNABINOIDS SERPL QL: NEGATIVE
CHLORIDE SERPL-SCNC: 102 MMOL/L (ref 98–107)
CLARITY UR: ABNORMAL
CO2 SERPL-SCNC: 20.2 MMOL/L (ref 22–29)
COCAINE UR QL: NEGATIVE
COLOR UR: YELLOW
CREAT BLD-MCNC: 1.44 MG/DL (ref 0.76–1.27)
DEPRECATED RDW RBC AUTO: 54.1 FL (ref 37–54)
EOSINOPHIL # BLD AUTO: 0.12 10*3/MM3 (ref 0.1–0.3)
EOSINOPHIL NFR BLD AUTO: 0.7 % (ref 0–4)
ERYTHROCYTE [DISTWIDTH] IN BLOOD BY AUTOMATED COUNT: 19.7 % (ref 11.5–14.5)
GFR SERPL CREATININE-BSD FRML MDRD: 48 ML/MIN/1.73
GLOBULIN UR ELPH-MCNC: 3.3 GM/DL
GLUCOSE BLD-MCNC: 169 MG/DL (ref 65–99)
GLUCOSE UR STRIP-MCNC: NEGATIVE MG/DL
HCT VFR BLD AUTO: 47.5 % (ref 42–52)
HGB BLD-MCNC: 14.6 G/DL (ref 14–18)
HGB UR QL STRIP.AUTO: ABNORMAL
HYALINE CASTS UR QL AUTO: ABNORMAL /LPF
IMM GRANULOCYTES # BLD: 0.08 10*3/MM3 (ref 0–0.03)
IMM GRANULOCYTES NFR BLD: 0.5 % (ref 0–0.5)
KETONES UR QL STRIP: NEGATIVE
LEUKOCYTE ESTERASE UR QL STRIP.AUTO: NEGATIVE
LYMPHOCYTES # BLD AUTO: 2.13 10*3/MM3 (ref 0.6–4.8)
LYMPHOCYTES NFR BLD AUTO: 13.1 % (ref 20–45)
MCH RBC QN AUTO: 24.7 PG (ref 27–31)
MCHC RBC AUTO-ENTMCNC: 30.7 G/DL (ref 31–37)
MCV RBC AUTO: 80.4 FL (ref 80–94)
METHADONE UR QL SCN: NEGATIVE
MONOCYTES # BLD AUTO: 1.16 10*3/MM3 (ref 0–1)
MONOCYTES NFR BLD AUTO: 7.1 % (ref 3–8)
NEUTROPHILS # BLD AUTO: 12.74 10*3/MM3 (ref 1.5–8.3)
NEUTROPHILS NFR BLD AUTO: 78.2 % (ref 45–70)
NITRITE UR QL STRIP: NEGATIVE
NRBC BLD MANUAL-RTO: 0 /100 WBC (ref 0–0)
OPIATES UR QL: NEGATIVE
OXYCODONE UR QL SCN: NEGATIVE
PCP UR QL SCN: NEGATIVE
PH UR STRIP.AUTO: 5.5 [PH] (ref 4.5–8)
PLATELET # BLD AUTO: 340 10*3/MM3 (ref 140–500)
PMV BLD AUTO: 10 FL (ref 7.4–10.4)
POTASSIUM BLD-SCNC: 3.7 MMOL/L (ref 3.5–5.2)
PROPOXYPH UR QL: NEGATIVE
PROT SERPL-MCNC: 7.1 G/DL (ref 6–8.5)
PROT UR QL STRIP: ABNORMAL
RBC # BLD AUTO: 5.91 10*6/MM3 (ref 4.7–6.1)
RBC # UR: ABNORMAL /HPF
REF LAB TEST METHOD: ABNORMAL
SODIUM BLD-SCNC: 141 MMOL/L (ref 136–145)
SP GR UR STRIP: 1.03 (ref 1–1.03)
SQUAMOUS #/AREA URNS HPF: ABNORMAL /HPF
TRICYCLICS UR QL SCN: NEGATIVE
TROPONIN T SERPL-MCNC: <0.01 NG/ML (ref 0–0.03)
UROBILINOGEN UR QL STRIP: ABNORMAL
WBC NRBC COR # BLD: 16.3 10*3/MM3 (ref 4.8–10.8)
WBC UR QL AUTO: ABNORMAL /HPF

## 2018-08-29 PROCEDURE — 71045 X-RAY EXAM CHEST 1 VIEW: CPT

## 2018-08-29 PROCEDURE — 93005 ELECTROCARDIOGRAM TRACING: CPT | Performed by: EMERGENCY MEDICINE

## 2018-08-29 PROCEDURE — 81001 URINALYSIS AUTO W/SCOPE: CPT | Performed by: EMERGENCY MEDICINE

## 2018-08-29 PROCEDURE — 93010 ELECTROCARDIOGRAM REPORT: CPT | Performed by: INTERNAL MEDICINE

## 2018-08-29 PROCEDURE — 96361 HYDRATE IV INFUSION ADD-ON: CPT

## 2018-08-29 PROCEDURE — 84484 ASSAY OF TROPONIN QUANT: CPT | Performed by: EMERGENCY MEDICINE

## 2018-08-29 PROCEDURE — 70450 CT HEAD/BRAIN W/O DYE: CPT

## 2018-08-29 PROCEDURE — 80306 DRUG TEST PRSMV INSTRMNT: CPT | Performed by: EMERGENCY MEDICINE

## 2018-08-29 PROCEDURE — 80053 COMPREHEN METABOLIC PANEL: CPT | Performed by: EMERGENCY MEDICINE

## 2018-08-29 PROCEDURE — 99285 EMERGENCY DEPT VISIT HI MDM: CPT | Performed by: EMERGENCY MEDICINE

## 2018-08-29 PROCEDURE — 85025 COMPLETE CBC W/AUTO DIFF WBC: CPT | Performed by: EMERGENCY MEDICINE

## 2018-08-29 PROCEDURE — 99284 EMERGENCY DEPT VISIT MOD MDM: CPT

## 2018-08-29 PROCEDURE — 96360 HYDRATION IV INFUSION INIT: CPT

## 2018-08-29 RX ORDER — SODIUM CHLORIDE 9 MG/ML
100 INJECTION, SOLUTION INTRAVENOUS CONTINUOUS
Status: DISCONTINUED | OUTPATIENT
Start: 2018-08-29 | End: 2018-08-29 | Stop reason: HOSPADM

## 2018-08-29 RX ORDER — MEMANTINE HYDROCHLORIDE 7 MG/1
28 CAPSULE, EXTENDED RELEASE ORAL DAILY
COMMUNITY
End: 2022-05-05 | Stop reason: ALTCHOICE

## 2018-08-29 RX ORDER — LISINOPRIL 10 MG/1
10 TABLET ORAL DAILY
COMMUNITY
End: 2019-08-16 | Stop reason: HOSPADM

## 2018-08-29 RX ORDER — DONEPEZIL HYDROCHLORIDE 10 MG/1
10 TABLET, FILM COATED ORAL NIGHTLY
COMMUNITY
End: 2021-03-18

## 2018-08-29 RX ORDER — FLUTICASONE PROPIONATE 50 MCG
1 SPRAY, SUSPENSION (ML) NASAL DAILY
COMMUNITY
End: 2019-08-16 | Stop reason: HOSPADM

## 2018-08-29 RX ORDER — LORATADINE 10 MG/1
10 CAPSULE, LIQUID FILLED ORAL DAILY
COMMUNITY
End: 2019-08-16 | Stop reason: HOSPADM

## 2018-08-29 RX ORDER — SODIUM CHLORIDE 0.9 % (FLUSH) 0.9 %
10 SYRINGE (ML) INJECTION AS NEEDED
Status: DISCONTINUED | OUTPATIENT
Start: 2018-08-29 | End: 2018-08-29 | Stop reason: HOSPADM

## 2018-08-29 RX ORDER — SULFASALAZINE 500 MG/1
500 TABLET ORAL 3 TIMES DAILY
COMMUNITY

## 2018-08-29 RX ORDER — OMEPRAZOLE 20 MG/1
20 CAPSULE, DELAYED RELEASE ORAL DAILY
COMMUNITY
End: 2019-08-16 | Stop reason: HOSPADM

## 2018-08-29 RX ADMIN — SODIUM CHLORIDE 100 ML/HR: 9 INJECTION, SOLUTION INTRAVENOUS at 06:55

## 2018-08-31 ENCOUNTER — TRANSCRIBE ORDERS (OUTPATIENT)
Dept: ADMINISTRATIVE | Facility: HOSPITAL | Age: 73
End: 2018-08-31

## 2018-08-31 DIAGNOSIS — I65.23 BILATERAL CAROTID ARTERY OCCLUSION: Primary | ICD-10-CM

## 2018-09-06 ENCOUNTER — HOSPITAL ENCOUNTER (OUTPATIENT)
Dept: CT IMAGING | Facility: HOSPITAL | Age: 73
Discharge: HOME | End: 2018-09-06
Attending: INTERNAL MEDICINE

## 2018-09-06 ENCOUNTER — HOSPITAL ENCOUNTER (OUTPATIENT)
Dept: CT IMAGING | Facility: HOSPITAL | Age: 73
Discharge: HOME | End: 2018-09-06
Attending: INTERNAL MEDICINE | Admitting: INTERNAL MEDICINE

## 2018-09-06 DIAGNOSIS — I65.23 BILATERAL CAROTID ARTERY OCCLUSION: ICD-10-CM

## 2018-09-06 PROCEDURE — 0 IOPAMIDOL PER 1 ML: Performed by: INTERNAL MEDICINE

## 2018-09-06 PROCEDURE — 70496 CT ANGIOGRAPHY HEAD: CPT

## 2018-09-06 PROCEDURE — 70498 CT ANGIOGRAPHY NECK: CPT

## 2018-09-06 PROCEDURE — 76377 3D RENDER W/INTRP POSTPROCES: CPT

## 2018-09-06 RX ADMIN — IOPAMIDOL 100 ML: 755 INJECTION, SOLUTION INTRAVENOUS at 17:15

## 2018-09-10 ENCOUNTER — APPOINTMENT (OUTPATIENT)
Dept: MRI IMAGING | Facility: HOSPITAL | Age: 73
End: 2018-09-10
Attending: INTERNAL MEDICINE

## 2019-01-31 ENCOUNTER — LAB REQUISITION (OUTPATIENT)
Dept: LAB | Facility: HOSPITAL | Age: 74
End: 2019-01-31

## 2019-01-31 DIAGNOSIS — I10 ESSENTIAL (PRIMARY) HYPERTENSION: ICD-10-CM

## 2019-01-31 DIAGNOSIS — N17.9 ACUTE KIDNEY FAILURE (HCC): ICD-10-CM

## 2019-01-31 LAB
ALBUMIN SERPL-MCNC: 4.1 G/DL (ref 3.5–5.2)
ALBUMIN/GLOB SERPL: 1.2 G/DL
ALP SERPL-CCNC: 81 U/L (ref 40–129)
ALT SERPL W P-5'-P-CCNC: 19 U/L (ref 5–41)
ANION GAP SERPL CALCULATED.3IONS-SCNC: 9.7 MMOL/L
AST SERPL-CCNC: 19 U/L (ref 5–40)
BASOPHILS # BLD AUTO: 0.07 10*3/MM3 (ref 0–0.2)
BASOPHILS NFR BLD AUTO: 0.7 % (ref 0–2)
BILIRUB SERPL-MCNC: 0.2 MG/DL (ref 0.2–1.2)
BUN BLD-MCNC: 22 MG/DL (ref 8–23)
BUN/CREAT SERPL: 16.9 (ref 7–25)
CALCIUM SPEC-SCNC: 9.4 MG/DL (ref 8.8–10.5)
CHLORIDE SERPL-SCNC: 105 MMOL/L (ref 98–107)
CHOLEST SERPL-MCNC: 134 MG/DL (ref 0–200)
CO2 SERPL-SCNC: 25.3 MMOL/L (ref 22–29)
CREAT BLD-MCNC: 1.3 MG/DL (ref 0.76–1.27)
DEPRECATED RDW RBC AUTO: 50.4 FL (ref 37–54)
EOSINOPHIL # BLD AUTO: 0.27 10*3/MM3 (ref 0.1–0.3)
EOSINOPHIL NFR BLD AUTO: 2.7 % (ref 0–4)
ERYTHROCYTE [DISTWIDTH] IN BLOOD BY AUTOMATED COUNT: 15.8 % (ref 11.5–14.5)
GFR SERPL CREATININE-BSD FRML MDRD: 54 ML/MIN/1.73
GLOBULIN UR ELPH-MCNC: 3.3 GM/DL
GLUCOSE BLD-MCNC: 83 MG/DL (ref 65–99)
HCT VFR BLD AUTO: 45.1 % (ref 42–52)
HDLC SERPL-MCNC: 41 MG/DL (ref 40–60)
HGB BLD-MCNC: 13.8 G/DL (ref 14–18)
IMM GRANULOCYTES # BLD AUTO: 0.03 10*3/MM3 (ref 0–0.03)
IMM GRANULOCYTES NFR BLD AUTO: 0.3 % (ref 0–0.5)
LDLC SERPL CALC-MCNC: 61 MG/DL (ref 0–100)
LDLC/HDLC SERPL: 1.48 {RATIO}
LYMPHOCYTES # BLD AUTO: 2.5 10*3/MM3 (ref 0.6–4.8)
LYMPHOCYTES NFR BLD AUTO: 25.2 % (ref 20–45)
MCH RBC QN AUTO: 27.2 PG (ref 27–31)
MCHC RBC AUTO-ENTMCNC: 30.6 G/DL (ref 31–37)
MCV RBC AUTO: 89 FL (ref 80–94)
MONOCYTES # BLD AUTO: 1.12 10*3/MM3 (ref 0–1)
MONOCYTES NFR BLD AUTO: 11.3 % (ref 3–8)
NEUTROPHILS # BLD AUTO: 5.95 10*3/MM3 (ref 1.5–8.3)
NEUTROPHILS NFR BLD AUTO: 59.8 % (ref 45–70)
NRBC BLD AUTO-RTO: 0 /100 WBC (ref 0–0)
PLATELET # BLD AUTO: 281 10*3/MM3 (ref 140–500)
PMV BLD AUTO: 10.3 FL (ref 7.4–10.4)
POTASSIUM BLD-SCNC: 4.7 MMOL/L (ref 3.5–5.2)
PROT SERPL-MCNC: 7.4 G/DL (ref 6–8.5)
RBC # BLD AUTO: 5.07 10*6/MM3 (ref 4.7–6.1)
SODIUM BLD-SCNC: 140 MMOL/L (ref 136–145)
TRIGL SERPL-MCNC: 161 MG/DL (ref 0–150)
VLDLC SERPL-MCNC: 32.2 MG/DL (ref 8–32)
WBC NRBC COR # BLD: 9.94 10*3/MM3 (ref 4.8–10.8)

## 2019-01-31 PROCEDURE — 80053 COMPREHEN METABOLIC PANEL: CPT | Performed by: INTERNAL MEDICINE

## 2019-01-31 PROCEDURE — 85025 COMPLETE CBC W/AUTO DIFF WBC: CPT | Performed by: INTERNAL MEDICINE

## 2019-01-31 PROCEDURE — 80061 LIPID PANEL: CPT | Performed by: INTERNAL MEDICINE

## 2019-08-13 ENCOUNTER — APPOINTMENT (OUTPATIENT)
Dept: CT IMAGING | Facility: HOSPITAL | Age: 74
End: 2019-08-13

## 2019-08-13 ENCOUNTER — HOSPITAL ENCOUNTER (INPATIENT)
Facility: HOSPITAL | Age: 74
LOS: 3 days | Discharge: NURSING, REHAB OR LONG-TERM CARE | End: 2019-08-16
Attending: EMERGENCY MEDICINE | Admitting: HOSPITALIST

## 2019-08-13 ENCOUNTER — APPOINTMENT (OUTPATIENT)
Dept: GENERAL RADIOLOGY | Facility: HOSPITAL | Age: 74
End: 2019-08-13

## 2019-08-13 ENCOUNTER — APPOINTMENT (OUTPATIENT)
Dept: ULTRASOUND IMAGING | Facility: HOSPITAL | Age: 74
End: 2019-08-13

## 2019-08-13 DIAGNOSIS — R55 SYNCOPE, UNSPECIFIED SYNCOPE TYPE: Primary | ICD-10-CM

## 2019-08-13 DIAGNOSIS — N17.9 AKI (ACUTE KIDNEY INJURY) (HCC): ICD-10-CM

## 2019-08-13 DIAGNOSIS — N39.0 ACUTE UTI: ICD-10-CM

## 2019-08-13 LAB
ALBUMIN SERPL-MCNC: 3.9 G/DL (ref 3.5–5.2)
ALBUMIN/GLOB SERPL: 1.1 G/DL
ALP SERPL-CCNC: 70 U/L (ref 39–117)
ALT SERPL W P-5'-P-CCNC: 18 U/L (ref 1–41)
ANION GAP SERPL CALCULATED.3IONS-SCNC: 13.2 MMOL/L (ref 5–15)
AST SERPL-CCNC: 20 U/L (ref 1–40)
BACTERIA UR QL AUTO: ABNORMAL /HPF
BASOPHILS # BLD AUTO: 0.04 10*3/MM3 (ref 0–0.2)
BASOPHILS NFR BLD AUTO: 0.3 % (ref 0–1.5)
BILIRUB SERPL-MCNC: 0.2 MG/DL (ref 0.2–1.2)
BILIRUB UR QL STRIP: NEGATIVE
BUN BLD-MCNC: 25 MG/DL (ref 8–23)
BUN/CREAT SERPL: 11.5 (ref 7–25)
CALCIUM SPEC-SCNC: 8.7 MG/DL (ref 8.6–10.5)
CHLORIDE SERPL-SCNC: 100 MMOL/L (ref 98–107)
CLARITY UR: ABNORMAL
CO2 SERPL-SCNC: 22.8 MMOL/L (ref 22–29)
COLOR UR: ABNORMAL
CREAT BLD-MCNC: 2.17 MG/DL (ref 0.76–1.27)
DEPRECATED RDW RBC AUTO: 56 FL (ref 37–54)
EOSINOPHIL # BLD AUTO: 0.04 10*3/MM3 (ref 0–0.4)
EOSINOPHIL NFR BLD AUTO: 0.3 % (ref 0.3–6.2)
ERYTHROCYTE [DISTWIDTH] IN BLOOD BY AUTOMATED COUNT: 17.7 % (ref 12.3–15.4)
FINE GRAN CASTS URNS QL MICRO: ABNORMAL /LPF
GFR SERPL CREATININE-BSD FRML MDRD: 30 ML/MIN/1.73
GLOBULIN UR ELPH-MCNC: 3.5 GM/DL
GLUCOSE BLD-MCNC: 122 MG/DL (ref 65–99)
GLUCOSE UR STRIP-MCNC: NEGATIVE MG/DL
HCT VFR BLD AUTO: 50.6 % (ref 37.5–51)
HGB BLD-MCNC: 15.5 G/DL (ref 13–17.7)
HGB UR QL STRIP.AUTO: ABNORMAL
HYALINE CASTS UR QL AUTO: ABNORMAL /LPF
IMM GRANULOCYTES # BLD AUTO: 0.07 10*3/MM3 (ref 0–0.05)
IMM GRANULOCYTES NFR BLD AUTO: 0.6 % (ref 0–0.5)
KETONES UR QL STRIP: ABNORMAL
LEUKOCYTE ESTERASE UR QL STRIP.AUTO: ABNORMAL
LYMPHOCYTES # BLD AUTO: 1.09 10*3/MM3 (ref 0.7–3.1)
LYMPHOCYTES NFR BLD AUTO: 9.5 % (ref 19.6–45.3)
MCH RBC QN AUTO: 27.1 PG (ref 26.6–33)
MCHC RBC AUTO-ENTMCNC: 30.6 G/DL (ref 31.5–35.7)
MCV RBC AUTO: 88.5 FL (ref 79–97)
MONOCYTES # BLD AUTO: 0.68 10*3/MM3 (ref 0.1–0.9)
MONOCYTES NFR BLD AUTO: 5.9 % (ref 5–12)
MUCOUS THREADS URNS QL MICRO: ABNORMAL /HPF
NEUTROPHILS # BLD AUTO: 9.56 10*3/MM3 (ref 1.7–7)
NEUTROPHILS NFR BLD AUTO: 83.4 % (ref 42.7–76)
NITRITE UR QL STRIP: NEGATIVE
NRBC BLD AUTO-RTO: 0 /100 WBC (ref 0–0.2)
PH UR STRIP.AUTO: <=5 [PH] (ref 5–8)
PLATELET # BLD AUTO: 314 10*3/MM3 (ref 140–450)
PMV BLD AUTO: 10.2 FL (ref 6–12)
POTASSIUM BLD-SCNC: 4.1 MMOL/L (ref 3.5–5.2)
PROT SERPL-MCNC: 7.4 G/DL (ref 6–8.5)
PROT UR QL STRIP: ABNORMAL
RBC # BLD AUTO: 5.72 10*6/MM3 (ref 4.14–5.8)
RBC # UR: ABNORMAL /HPF
REF LAB TEST METHOD: ABNORMAL
SODIUM BLD-SCNC: 136 MMOL/L (ref 136–145)
SP GR UR STRIP: 1.02 (ref 1–1.03)
SQUAMOUS #/AREA URNS HPF: ABNORMAL /HPF
TROPONIN T SERPL-MCNC: 0.01 NG/ML (ref 0–0.03)
UROBILINOGEN UR QL STRIP: ABNORMAL
VALPROATE SERPL-MCNC: 20 MCG/ML (ref 50–125)
WBC NRBC COR # BLD: 11.48 10*3/MM3 (ref 3.4–10.8)
WBC UR QL AUTO: ABNORMAL /HPF

## 2019-08-13 PROCEDURE — P9612 CATHETERIZE FOR URINE SPEC: HCPCS

## 2019-08-13 PROCEDURE — 71045 X-RAY EXAM CHEST 1 VIEW: CPT

## 2019-08-13 PROCEDURE — G0378 HOSPITAL OBSERVATION PER HR: HCPCS

## 2019-08-13 PROCEDURE — 93005 ELECTROCARDIOGRAM TRACING: CPT | Performed by: PHYSICIAN ASSISTANT

## 2019-08-13 PROCEDURE — 85025 COMPLETE CBC W/AUTO DIFF WBC: CPT | Performed by: PHYSICIAN ASSISTANT

## 2019-08-13 PROCEDURE — 93010 ELECTROCARDIOGRAM REPORT: CPT | Performed by: INTERNAL MEDICINE

## 2019-08-13 PROCEDURE — 70450 CT HEAD/BRAIN W/O DYE: CPT

## 2019-08-13 PROCEDURE — 80164 ASSAY DIPROPYLACETIC ACD TOT: CPT | Performed by: PHYSICIAN ASSISTANT

## 2019-08-13 PROCEDURE — 99285 EMERGENCY DEPT VISIT HI MDM: CPT

## 2019-08-13 PROCEDURE — 84484 ASSAY OF TROPONIN QUANT: CPT | Performed by: PHYSICIAN ASSISTANT

## 2019-08-13 PROCEDURE — 25810000003 SODIUM CHLORIDE 0.9 % WITH KCL 20 MEQ 20-0.9 MEQ/L-% SOLUTION: Performed by: HOSPITALIST

## 2019-08-13 PROCEDURE — 80053 COMPREHEN METABOLIC PANEL: CPT | Performed by: PHYSICIAN ASSISTANT

## 2019-08-13 PROCEDURE — 25010000002 CEFTRIAXONE PER 250 MG: Performed by: PHYSICIAN ASSISTANT

## 2019-08-13 PROCEDURE — 96365 THER/PROPH/DIAG IV INF INIT: CPT

## 2019-08-13 PROCEDURE — 87086 URINE CULTURE/COLONY COUNT: CPT | Performed by: PHYSICIAN ASSISTANT

## 2019-08-13 PROCEDURE — 81001 URINALYSIS AUTO W/SCOPE: CPT | Performed by: PHYSICIAN ASSISTANT

## 2019-08-13 PROCEDURE — 76775 US EXAM ABDO BACK WALL LIM: CPT

## 2019-08-13 RX ORDER — SULFASALAZINE 500 MG/1
500 TABLET ORAL 3 TIMES DAILY
Status: DISCONTINUED | OUTPATIENT
Start: 2019-08-13 | End: 2019-08-16 | Stop reason: HOSPADM

## 2019-08-13 RX ORDER — MONTELUKAST SODIUM 10 MG/1
10 TABLET ORAL NIGHTLY
Status: DISCONTINUED | OUTPATIENT
Start: 2019-08-13 | End: 2019-08-13

## 2019-08-13 RX ORDER — CALCIUM CARBONATE 200(500)MG
1 TABLET,CHEWABLE ORAL EVERY 6 HOURS PRN
COMMUNITY
End: 2019-08-16 | Stop reason: HOSPADM

## 2019-08-13 RX ORDER — IRON POLYSACCHARIDE COMPLEX 150 MG
150 CAPSULE ORAL DAILY
Status: DISCONTINUED | OUTPATIENT
Start: 2019-08-13 | End: 2019-08-13

## 2019-08-13 RX ORDER — SODIUM CHLORIDE AND POTASSIUM CHLORIDE 150; 900 MG/100ML; MG/100ML
75 INJECTION, SOLUTION INTRAVENOUS CONTINUOUS
Status: DISCONTINUED | OUTPATIENT
Start: 2019-08-13 | End: 2019-08-15

## 2019-08-13 RX ORDER — PANTOPRAZOLE SODIUM 40 MG/1
40 TABLET, DELAYED RELEASE ORAL EVERY MORNING
Status: DISCONTINUED | OUTPATIENT
Start: 2019-08-14 | End: 2019-08-16 | Stop reason: HOSPADM

## 2019-08-13 RX ORDER — MONTELUKAST SODIUM 10 MG/1
10 TABLET ORAL NIGHTLY
COMMUNITY
End: 2019-08-16 | Stop reason: HOSPADM

## 2019-08-13 RX ORDER — DILTIAZEM HYDROCHLORIDE 60 MG/1
60 TABLET, FILM COATED ORAL 3 TIMES DAILY
Status: DISCONTINUED | OUTPATIENT
Start: 2019-08-13 | End: 2019-08-15

## 2019-08-13 RX ORDER — BISACODYL 10 MG
10 SUPPOSITORY, RECTAL RECTAL DAILY PRN
COMMUNITY
End: 2019-08-16 | Stop reason: HOSPADM

## 2019-08-13 RX ORDER — ACETAMINOPHEN 325 MG/1
650 TABLET ORAL EVERY 4 HOURS PRN
COMMUNITY
End: 2019-08-16 | Stop reason: HOSPADM

## 2019-08-13 RX ORDER — KETOTIFEN FUMARATE 0.35 MG/ML
1 SOLUTION/ DROPS OPHTHALMIC 2 TIMES DAILY
Status: DISCONTINUED | OUTPATIENT
Start: 2019-08-13 | End: 2019-08-16 | Stop reason: HOSPADM

## 2019-08-13 RX ORDER — LISINOPRIL 10 MG/1
10 TABLET ORAL DAILY
Status: DISCONTINUED | OUTPATIENT
Start: 2019-08-13 | End: 2019-08-14

## 2019-08-13 RX ORDER — DIVALPROEX SODIUM 125 MG/1
125 CAPSULE, COATED PELLETS ORAL EVERY 12 HOURS SCHEDULED
Status: DISCONTINUED | OUTPATIENT
Start: 2019-08-13 | End: 2019-08-16 | Stop reason: HOSPADM

## 2019-08-13 RX ORDER — OMEPRAZOLE 40 MG/1
20 CAPSULE, DELAYED RELEASE ORAL DAILY
COMMUNITY
End: 2023-03-07 | Stop reason: ALTCHOICE

## 2019-08-13 RX ORDER — ASPIRIN 81 MG/1
81 TABLET ORAL DAILY
Status: DISCONTINUED | OUTPATIENT
Start: 2019-08-13 | End: 2019-08-16 | Stop reason: HOSPADM

## 2019-08-13 RX ORDER — MEMANTINE HYDROCHLORIDE 10 MG/1
10 TABLET ORAL EVERY 12 HOURS SCHEDULED
Status: DISCONTINUED | OUTPATIENT
Start: 2019-08-13 | End: 2019-08-16 | Stop reason: HOSPADM

## 2019-08-13 RX ORDER — DIVALPROEX SODIUM 125 MG/1
125 CAPSULE, COATED PELLETS ORAL 2 TIMES DAILY
COMMUNITY

## 2019-08-13 RX ORDER — CEFTRIAXONE SODIUM 1 G/50ML
1 INJECTION, SOLUTION INTRAVENOUS ONCE
Status: COMPLETED | OUTPATIENT
Start: 2019-08-13 | End: 2019-08-13

## 2019-08-13 RX ORDER — CETIRIZINE HYDROCHLORIDE 10 MG/1
10 TABLET ORAL DAILY
COMMUNITY
End: 2019-08-16 | Stop reason: HOSPADM

## 2019-08-13 RX ORDER — ATORVASTATIN CALCIUM 10 MG/1
10 TABLET, FILM COATED ORAL NIGHTLY
Status: DISCONTINUED | OUTPATIENT
Start: 2019-08-13 | End: 2019-08-16 | Stop reason: HOSPADM

## 2019-08-13 RX ORDER — CEFTRIAXONE SODIUM 1 G/50ML
1 INJECTION, SOLUTION INTRAVENOUS EVERY 24 HOURS
Status: DISCONTINUED | OUTPATIENT
Start: 2019-08-14 | End: 2019-08-16 | Stop reason: HOSPADM

## 2019-08-13 RX ORDER — SODIUM CHLORIDE 0.9 % (FLUSH) 0.9 %
10 SYRINGE (ML) INJECTION AS NEEDED
Status: DISCONTINUED | OUTPATIENT
Start: 2019-08-13 | End: 2019-08-16 | Stop reason: HOSPADM

## 2019-08-13 RX ORDER — ATORVASTATIN CALCIUM 20 MG/1
40 TABLET, FILM COATED ORAL NIGHTLY
Status: DISCONTINUED | OUTPATIENT
Start: 2019-08-13 | End: 2019-08-13

## 2019-08-13 RX ORDER — DONEPEZIL HYDROCHLORIDE 10 MG/1
10 TABLET, FILM COATED ORAL NIGHTLY
Status: DISCONTINUED | OUTPATIENT
Start: 2019-08-13 | End: 2019-08-16 | Stop reason: HOSPADM

## 2019-08-13 RX ORDER — SODIUM CHLORIDE 9 MG/ML
100 INJECTION, SOLUTION INTRAVENOUS CONTINUOUS
Status: DISCONTINUED | OUTPATIENT
Start: 2019-08-13 | End: 2019-08-13

## 2019-08-13 RX ORDER — ASPIRIN 81 MG/1
81 TABLET ORAL DAILY
COMMUNITY

## 2019-08-13 RX ORDER — MEMANTINE HYDROCHLORIDE 28 MG/1
28 CAPSULE, EXTENDED RELEASE ORAL DAILY
COMMUNITY
End: 2019-08-16 | Stop reason: HOSPADM

## 2019-08-13 RX ORDER — MIRTAZAPINE 15 MG/1
15 TABLET, FILM COATED ORAL NIGHTLY
COMMUNITY
End: 2022-05-05 | Stop reason: ALTCHOICE

## 2019-08-13 RX ORDER — OLOPATADINE HYDROCHLORIDE 2 MG/ML
1 SOLUTION/ DROPS OPHTHALMIC EVERY 8 HOURS
COMMUNITY
End: 2021-03-18

## 2019-08-13 RX ORDER — MIRTAZAPINE 15 MG/1
15 TABLET, FILM COATED ORAL NIGHTLY
Status: DISCONTINUED | OUTPATIENT
Start: 2019-08-13 | End: 2019-08-16 | Stop reason: HOSPADM

## 2019-08-13 RX ADMIN — MIRTAZAPINE 15 MG: 15 TABLET, FILM COATED ORAL at 20:29

## 2019-08-13 RX ADMIN — KETOTIFEN FUMARATE 1 DROP: 0.35 SOLUTION/ DROPS OPHTHALMIC at 20:30

## 2019-08-13 RX ADMIN — MEMANTINE HYDROCHLORIDE 10 MG: 10 TABLET, FILM COATED ORAL at 20:29

## 2019-08-13 RX ADMIN — LISINOPRIL 10 MG: 10 TABLET ORAL at 20:29

## 2019-08-13 RX ADMIN — POTASSIUM CHLORIDE AND SODIUM CHLORIDE 75 ML/HR: 900; 150 INJECTION, SOLUTION INTRAVENOUS at 20:29

## 2019-08-13 RX ADMIN — DILTIAZEM HYDROCHLORIDE 60 MG: 60 TABLET, FILM COATED ORAL at 20:30

## 2019-08-13 RX ADMIN — ASPIRIN 81 MG: 81 TABLET, COATED ORAL at 20:30

## 2019-08-13 RX ADMIN — DONEPEZIL HYDROCHLORIDE 10 MG: 10 TABLET, FILM COATED ORAL at 20:30

## 2019-08-13 RX ADMIN — DIVALPROEX SODIUM 125 MG: 125 CAPSULE, COATED PELLETS ORAL at 20:30

## 2019-08-13 RX ADMIN — SODIUM CHLORIDE 1000 ML: 9 INJECTION, SOLUTION INTRAVENOUS at 13:15

## 2019-08-13 RX ADMIN — ATORVASTATIN CALCIUM 10 MG: 10 TABLET, FILM COATED ORAL at 20:30

## 2019-08-13 RX ADMIN — CALCIUM CARBONATE-VITAMIN D TAB 500 MG-200 UNIT 1000 MG: 500-200 TAB at 20:30

## 2019-08-13 RX ADMIN — CEFTRIAXONE SODIUM 1 G: 1 INJECTION, SOLUTION INTRAVENOUS at 14:38

## 2019-08-13 RX ADMIN — SODIUM CHLORIDE 100 ML/HR: 9 INJECTION, SOLUTION INTRAVENOUS at 14:39

## 2019-08-13 RX ADMIN — SULFASALAZINE 500 MG: 500 TABLET ORAL at 20:29

## 2019-08-13 NOTE — ED PROVIDER NOTES
" EMERGENCY DEPARTMENT ENCOUNTER    CHIEF COMPLAINT  Chief Complaint: Generalized Weakness   History given by: Patient   History limited by: none   Room Number: 32/32  PMD: Natanael Fernandez MD      HPI:  Pt is a 74 y.o. male who presents to ED via EMS from [facility] complaining of 1 episode of generalized weakness that began while standing in the shower PTA at ED. Pt states he called RN for help and \"doesn't know what happened after that\". Per NH staff, pt had syncopal event but denies hitting head or any other pain from event. Pt states he has had nausea and fatigue for the past week, but denies dizziness, lightheadedness, SOA, chest pain, fever, vomiting, or diarrhea. Pt affirms he has hx of carotid stenosis, but denies any issues due to it.  Pt states he is in a wheelchair at baseline.     Duration:  Unspecified, PTA at ED   Onset: gradual   Timing: constant   Location: generalized   Radiation: none   Quality: weakness   Intensity/Severity: moderate   Progression: unchanged   Associated Symptoms: syncope, nausea, and fatigue   Aggravating Factors: none   Alleviating Factors: none   Previous Episodes: none   Treatment before arrival: none     PAST MEDICAL HISTORY  Active Ambulatory Problems     Diagnosis Date Noted   • No Active Ambulatory Problems     Resolved Ambulatory Problems     Diagnosis Date Noted   • No Resolved Ambulatory Problems     Past Medical History:   Diagnosis Date   • Acute kidney injury (CMS/HCC)    • Altered mental status, unspecified    • Anemia    • Aspiration pneumonia (CMS/HCC)    • Atrial fibrillation (CMS/HCC)    • Coronary artery disease    • Depression    • Diabetes mellitus (CMS/HCC)    • Dysphagia    • Encephalopathy acute    • GERD (gastroesophageal reflux disease)    • Hyperlipidemia    • Hypertension    • Hypocalcemia    • Left bundle branch block    • Leukocytosis    • LV dysfunction    • Septic shock (CMS/HCC)    • Stroke (CMS/HCC)    • Syncope    • Ulcerative colitis (CMS/HCC)  "       PAST SURGICAL HISTORY  Past Surgical History:   Procedure Laterality Date   • CARDIAC SURGERY     • GTUBE INSERTION         FAMILY HISTORY  History reviewed. No pertinent family history.    SOCIAL HISTORY  Social History     Socioeconomic History   • Marital status: Single     Spouse name: Not on file   • Number of children: Not on file   • Years of education: Not on file   • Highest education level: Not on file   Tobacco Use   • Smoking status: Former Smoker   Substance and Sexual Activity   • Alcohol use: No   • Drug use: Defer   • Sexual activity: Defer       ALLERGIES  Patient has no known allergies.    REVIEW OF SYSTEMS  Review of Systems   Constitutional: Positive for fatigue. Negative for activity change, appetite change and fever.   HENT: Negative for congestion and sore throat.    Eyes: Negative.    Respiratory: Negative for cough and shortness of breath.    Cardiovascular: Negative for chest pain and leg swelling.   Gastrointestinal: Positive for nausea. Negative for abdominal pain, diarrhea and vomiting.   Genitourinary: Negative for decreased urine volume and dysuria.   Musculoskeletal: Negative for neck pain.   Skin: Negative for rash and wound.   Neurological: Positive for syncope (1 episode) and weakness (generalized ). Negative for numbness and headaches.   Psychiatric/Behavioral: Negative.    All other systems reviewed and are negative.      PHYSICAL EXAM  ED Triage Vitals   Temp Heart Rate Resp BP SpO2   08/13/19 1237 08/13/19 1235 08/13/19 1235 08/13/19 1237 08/13/19 1235   97.4 °F (36.3 °C) 96 18 97/55 92 %      Temp src Heart Rate Source Patient Position BP Location FiO2 (%)   08/13/19 1237 08/13/19 1235 08/13/19 1237 -- --   Tympanic Monitor Lying         Physical Exam   Constitutional: He is oriented to person, place, and time. No distress.   HENT:   Head: Normocephalic and atraumatic.   Eyes: EOM are normal. Pupils are equal, round, and reactive to light.   Neck: Normal range of motion.  Neck supple.   Cardiovascular: Normal rate, regular rhythm and normal heart sounds.   Pulmonary/Chest: Effort normal and breath sounds normal. No respiratory distress.   Abdominal: Soft. There is no tenderness. There is no rebound and no guarding.   Musculoskeletal: Normal range of motion. He exhibits no edema.   Neurological: He is alert and oriented to person, place, and time. He has normal sensation and normal strength.   Skin: Skin is warm and dry.   Psychiatric: Mood and affect normal.   Nursing note and vitals reviewed.      LAB RESULTS  Lab Results (last 24 hours)     Procedure Component Value Units Date/Time    CBC & Differential [[id number]] Collected:  08/13/19 1309    Specimen:  Blood Updated:  08/13/19 1354    Narrative:       The following orders were created for panel order CBC & Differential.  Procedure                               Abnormality         Status                     ---------                               -----------         ------                     CBC Auto Differential[[id number]]        Abnormal            Final result                 Please view results for these tests on the individual orders.    Comprehensive Metabolic Panel [[id number]]  (Abnormal) Collected:  08/13/19 1309    Specimen:  Blood Updated:  08/13/19 1352     Glucose 122 mg/dL      BUN 25 mg/dL      Creatinine 2.17 mg/dL      Sodium 136 mmol/L      Potassium 4.1 mmol/L      Chloride 100 mmol/L      CO2 22.8 mmol/L      Calcium 8.7 mg/dL      Total Protein 7.4 g/dL      Albumin 3.90 g/dL      ALT (SGPT) 18 U/L      AST (SGOT) 20 U/L      Alkaline Phosphatase 70 U/L      Total Bilirubin 0.2 mg/dL      eGFR Non African Amer 30 mL/min/1.73      Globulin 3.5 gm/dL      A/G Ratio 1.1 g/dL      BUN/Creatinine Ratio 11.5     Anion Gap 13.2 mmol/L     Narrative:       GFR Normal >60  Chronic Kidney Disease <60  Kidney Failure <15    Troponin [[id number]]  (Normal) Collected:  08/13/19 1309    Specimen:  Blood Updated:  08/13/19  1352     Troponin T 0.013 ng/mL     Narrative:       Troponin T Reference Range:  <= 0.03 ng/mL-   Negative for AMI  >0.03 ng/mL-     Abnormal for myocardial necrosis.  Clinicians would have to utilize clinical acumen, EKG, Troponin and serial changes to determine if it is an Acute Myocardial Infarction or myocardial injury due to an underlying chronic condition.     CBC Auto Differential [[id number]]  (Abnormal) Collected:  08/13/19 1309    Specimen:  Blood Updated:  08/13/19 1354     WBC 11.48 10*3/mm3      RBC 5.72 10*6/mm3      Hemoglobin 15.5 g/dL      Hematocrit 50.6 %      MCV 88.5 fL      MCH 27.1 pg      MCHC 30.6 g/dL      RDW 17.7 %      RDW-SD 56.0 fl      MPV 10.2 fL      Platelets 314 10*3/mm3      Neutrophil % 83.4 %      Lymphocyte % 9.5 %      Monocyte % 5.9 %      Eosinophil % 0.3 %      Basophil % 0.3 %      Immature Grans % 0.6 %      Neutrophils, Absolute 9.56 10*3/mm3      Lymphocytes, Absolute 1.09 10*3/mm3      Monocytes, Absolute 0.68 10*3/mm3      Eosinophils, Absolute 0.04 10*3/mm3      Basophils, Absolute 0.04 10*3/mm3      Immature Grans, Absolute 0.07 10*3/mm3      nRBC 0.0 /100 WBC     Valproic Acid Level, Total [[id number]]  (Abnormal) Collected:  08/13/19 1309    Specimen:  Blood Updated:  08/13/19 1413     Valproic Acid 20.0 mcg/mL     Urinalysis With Microscopic If Indicated (No Culture) - Urine, Catheter [[id number]]  (Abnormal) Collected:  08/13/19 1314    Specimen:  Urine, Catheter Updated:  08/13/19 1337     Color, UA Dark Yellow     Appearance, UA Cloudy     pH, UA <=5.0     Specific Gravity, UA 1.025     Glucose, UA Negative     Ketones, UA Trace     Bilirubin, UA Negative     Blood, UA Trace     Protein, UA 30 mg/dL (1+)     Leuk Esterase, UA Trace     Nitrite, UA Negative     Urobilinogen, UA 0.2 E.U./dL    Urinalysis, Microscopic Only - Urine, Catheter [[id number]]  (Abnormal) Collected:  08/13/19 1314    Specimen:  Urine, Catheter Updated:  08/13/19 1353     RBC, UA 6-12 /HPF       WBC, UA 13-20 /HPF      Bacteria, UA 2+ /HPF      Squamous Epithelial Cells, UA 3-6 /HPF      Hyaline Casts, UA 7-12 /LPF      Fine Granular Casts, UA 0-2 /LPF      Mucus, UA Large/3+ /HPF      Methodology Manual Light Microscopy    Urine Culture - Urine, Urine, Catheter [[id number]] Collected:  08/13/19 1314    Specimen:  Urine, Catheter Updated:  08/13/19 1436          I ordered the above labs and reviewed the results    RADIOLOGY  CT Head Without Contrast   Final Result       No acute intracranial hemorrhage or hydrocephalus. Chronic changes of   the brain. If there is further clinical concern, MRI could be considered   for further evaluation.       This report was finalized on 8/13/2019 2:15 PM by Dr. Sagar Verduzco M.D.          XR Chest 1 View   Final Result   No focal pulmonary consolidation. Tortuous aorta. Follow-up   as clinically indicated.       This report was finalized on 8/13/2019 1:58 PM by Dr. Sagar Verduzco M.D.               I ordered the above noted radiological studies. Interpreted by radiologist. Reviewed by me in PACS.       PROCEDURES  Procedures  EKG          EKG time: 1306  Rhythm/Rate: NSR 86  P waves and MO: nml  QRS, axis: LBBB   ST and T waves: non specific T wave changes    Poor quality study    Repeat EKG 14:47  84 bpm, NSR  Normal P, MO  QRS LBBB  Non specific T wave changes     Interpreted Contemporaneously by me, independently viewed  Changed compared to prior 8/29/18 - LBBB is new      PROGRESS AND CONSULTS     1258: Labs and CXR ordered for further evaluation. CT head ordered due to syncopal episode.     1419: Pt rechecked and resting comfortably, states he is feeling improved. Pt's BP is 122/77. Discussed plan for admission and abx treatment due to hypotension, UTI and elevated Creatinine seen on labs. Pt understands and agrees with the plan, all questions answered.    1428: Call placed to [facility]. Rocephin ordered for abx treatment.     1448: Discussed pt's case  with Dr. Oakes ([facility]) who agreed to admit pt to telemetry for further abx treatment and monitoring.     MEDICAL DECISION MAKING  Results were reviewed/discussed with the patient and they were also made aware of online access. Pt also made aware that some labs, such as cultures, will not be resulted during ER visit and follow up with PMD is necessary.     MDM  Number of Diagnoses or Management Options     Amount and/or Complexity of Data Reviewed  Clinical lab tests: ordered and reviewed (UA: WBC - 13-20, Bacteria - 2+, leuk esterase - trace  BUN - 25  Creatinine - 2.17)  Tests in the radiology section of CPT®: ordered and reviewed (CXR - no consolidation   CT head - no acute intracranial hemorrhage or hydrocephalus )  Tests in the medicine section of CPT®: ordered and reviewed (See note)  Decide to obtain previous medical records or to obtain history from someone other than the patient: yes  Review and summarize past medical records: yes (8/29/18 and 10/2018 - seen at [facility] for same complaint. Hx of syncope, anemia, and hypotension.   11/2018 - CTA showed bilaterally internal carotid artery occlusion. )           DIAGNOSIS  Final diagnoses:   Syncope, unspecified syncope type   Acute UTI   KELLY (acute kidney injury) (CMS/[facility])       DISPOSITION  ADMISSION    Discussed treatment plan and reason for admission with pt/family and admitting physician.  Pt/family voiced understanding of the plan for admission for further testing/treatment as needed.         Latest Documented Vital Signs:  As of 2:41 PM  BP- 104/69 HR- 87 Temp- 97.4 °F (36.3 °C) (Tympanic) O2 sat- 97%    --  Documentation assistance provided by clovis Will for Mehran Robbins PA-C.  Information recorded by the scribe was done at my direction and has been verified and validated by me.            Sonam Will  08/13/19 [id number]       Mehran Robbins PA  08/13/19 [id number]

## 2019-08-13 NOTE — ED PROVIDER NOTES
[facility] with hx of [facility] after a syncopal episode/generalized weakness while in the shower this AM. Pt has been hypotensive since the episode. Pt states that he has felt generally unwell for one week but states that it has improved at this time. Pt has hx of syncope.     On exam,   Alert, oriented x3, NAD, heat is regular rhythm w/o murmur and lungs are clear bilaterally.      Plan: Review lab work and imaging studies.      Attestation:  The ANAIS and I have discussed this patient's history, physical exam, and treatment plan.  I have reviewed the documentation and personally had a face to face interaction with the patient. I affirm the documentation and agree with the treatment and plan.  The attached note describes my personal findings.       Documentation assistance provided by clovis Diop for MD Deven.  Information recorded by the scribe was done at my direction and has been verified and validated by me.       Brielle Diop  08/13/19 [id number]       Mahesh Pisano MD  08/13/19 2913

## 2019-08-13 NOTE — ED NOTES
"Pt denies syncope. Pt reports he was taking a shower and started feeling more weak. Pt called for help and was assisted back to wheelchair. \"I don't know what happened after that.\"     Debra Tse RN  08/13/19 0070    "

## 2019-08-13 NOTE — PLAN OF CARE
Problem: Patient Care Overview  Goal: Plan of Care Review  Outcome: Ongoing (interventions implemented as appropriate)   08/13/19 1812   Coping/Psychosocial   Plan of Care Reviewed With patient   OTHER   Outcome Summary Patient admitted today from [facility] with syncope episode; decrease loc; pale; diaphoretic; hypoTN; NPO pending speech therapy Swallow test; NKDA; last BM: 08/13/19; Skin intact; lungs clear bilaterally     08/13/19 1812   Coping/Psychosocial   Plan of Care Reviewed With patient   OTHER   Outcome Summary Patient admitted today from            Problem: Syncope (Adult)  Goal: Physical Safety/Health Maintenance  Outcome: Ongoing (interventions implemented as appropriate)    Goal: Optimal Emotional/Functional [place]  Outcome: Ongoing (interventions implemented as appropriate)      Problem: Fall Risk (Adult)  Goal: Identify Related Risk Factors and Signs and Symptoms  Outcome: Ongoing (interventions implemented as appropriate)    Goal: Absence of Fall  Outcome: Ongoing (interventions implemented as appropriate)

## 2019-08-13 NOTE — ED NOTES
Brandi RIVERS from [facility] called ahead report that this patient had a witnessed syncopal episode (witnessed by LPN). She reports that he was diaphoretic, hypotensive, and had decreased LOC. She denies that he had a head injury. He is not on blood thinners and he does take blood pressure medications. He does have a history of CAD and LBBB. She reports that BP at the time of fall was 50/40, HR 94, O2 94% RA. Pt to arrives by EMS.      Dianna Majano RN  08/13/19 1155       Dianna Majano RN  08/13/19 1200

## 2019-08-13 NOTE — H&P
History and physical    Primary care physician  Dr. Fernandez    Chief complaint  Generalized weakness    History of present illness  74-year-old white male with history of dementia ulcerative colitis hypertension hyperlipidemia and chronic kidney disease stage III who is a nursing home resident brought to the emergency room with generalized weakness for last few days with no appetite.  Patient denies any chest pain shortness of breath abdominal pain nausea vomiting but he does have chronic diarrhea and he almost passed out while taking shower.  Patient evaluated in ER found to be in acute kidney injury on top of chronic kidney disease also found to have UTI admit for management.  Patient fully alert oriented no specific complaint except does not want to eat and is still feeling very weak.  No other complaints.    PAST MEDICAL HISTORY  • Acute kidney injury (CMS/HCC)     • Altered mental status, unspecified     • Anemia     • Aspiration pneumonia (CMS/HCC)     • Atrial fibrillation (CMS/HCC)     • Coronary artery disease     • Depression     • Diabetes mellitus (CMS/HCC)     • Dysphagia     • Encephalopathy acute     • GERD (gastroesophageal reflux disease)     • Hyperlipidemia     • Hypertension     • Hypocalcemia     • Left bundle branch block     • Leukocytosis     • LV dysfunction     • Septic shock (CMS/HCC)     • Stroke (CMS/HCC)     • Syncope     • Ulcerative colitis (CMS/HCC)        PAST SURGICAL HISTORY  Surgical History         Past Surgical History:   Procedure Laterality Date   • CARDIAC SURGERY       • GTUBE INSERTION             FAMILY HISTORY  History reviewed. No pertinent family history.     SOCIAL HISTORY  Social History   Social History            Socioeconomic History   • Marital status: Single       Spouse name: Not on file   • Number of children: Not on file   • Years of education: Not on file   • Highest education level: Not on file   Tobacco Use   • Smoking status: Former Smoker   Substance and  "Sexual Activity   • Alcohol use: No   • Drug use: Defer   • Sexual activity: Defer         ALLERGIES  Patient has no known allergies.  Home medications reviewed     REVIEW OF SYSTEMS  Constitutional: Positive for fatigue. Negative for activity change, appetite change and fever.   HENT: Negative for congestion and sore throat.    Eyes: Negative.    Respiratory: Negative for cough and shortness of breath.    Cardiovascular: Negative for chest pain and leg swelling.   Gastrointestinal: Positive for nausea. Negative for abdominal pain, diarrhea and vomiting.   Genitourinary: Negative for decreased urine volume and dysuria.   Musculoskeletal: Negative for neck pain.   Skin: Negative for rash and wound.   Neurological: Positive for syncope (1 episode) and weakness (generalized ). Negative for numbness and headaches.   Psychiatric/Behavioral: Negative.    All other systems reviewed and are negative.     PHYSICAL EXAM  Blood pressure 135/78, pulse 94, temperature 97.5 °F (36.4 °C), temperature source Oral, resp. rate 18, height 182.9 cm (72\"), weight 110 kg (242 lb 8.1 oz), SpO2 94 %.    Constitutional: He is oriented to person, place, and time. No distress.   Head: Normocephalic and atraumatic.   Eyes: EOM are normal. Pupils are equal, round, and reactive to light.   Neck: Normal range of motion. Neck supple.   Cardiovascular: Normal rate, regular rhythm and normal heart sounds.   Pulmonary/Chest: Effort normal and breath sounds normal. No respiratory distress.   Abdominal: Soft. There is no tenderness. There is no rebound and no guarding.   Musculoskeletal: Normal range of motion. He exhibits no edema.   Neurological: He is alert and oriented to person, place, and time. He has normal sensation and normal strength.   Skin: Skin is warm and dry.   Psychiatric: Mood and affect normal.      LAB RESULTS  Lab Results (last 24 hours)     Procedure Component Value Units Date/Time    Urine Culture - Urine, Urine, Catheter " [[id number]] Collected:  08/13/19 1314    Specimen:  Urine, Catheter Updated:  08/13/19 1436    Valproic Acid Level, Total [[id number]]  (Abnormal) Collected:  08/13/19 1309    Specimen:  Blood Updated:  08/13/19 1413     Valproic Acid 20.0 mcg/mL     CBC & Differential [[id number]] Collected:  08/13/19 1309    Specimen:  Blood Updated:  08/13/19 1354    Narrative:       The following orders were created for panel order CBC & Differential.  Procedure                               Abnormality         Status                     ---------                               -----------         ------                     CBC Auto Differential[[id number]]        Abnormal            Final result                 Please view results for these tests on the individual orders.    CBC Auto Differential [[id number]]  (Abnormal) Collected:  08/13/19 1309    Specimen:  Blood Updated:  08/13/19 1354     WBC 11.48 10*3/mm3      RBC 5.72 10*6/mm3      Hemoglobin 15.5 g/dL      Hematocrit 50.6 %      MCV 88.5 fL      MCH 27.1 pg      MCHC 30.6 g/dL      RDW 17.7 %      RDW-SD 56.0 fl      MPV 10.2 fL      Platelets 314 10*3/mm3      Neutrophil % 83.4 %      Lymphocyte % 9.5 %      Monocyte % 5.9 %      Eosinophil % 0.3 %      Basophil % 0.3 %      Immature Grans % 0.6 %      Neutrophils, Absolute 9.56 10*3/mm3      Lymphocytes, Absolute 1.09 10*3/mm3      Monocytes, Absolute 0.68 10*3/mm3      Eosinophils, Absolute 0.04 10*3/mm3      Basophils, Absolute 0.04 10*3/mm3      Immature Grans, Absolute 0.07 10*3/mm3      nRBC 0.0 /100 WBC     Urinalysis, Microscopic Only - Urine, Catheter [[id number]]  (Abnormal) Collected:  08/13/19 1314    Specimen:  Urine, Catheter Updated:  08/13/19 1353     RBC, UA 6-12 /HPF      WBC, UA 13-20 /HPF      Bacteria, UA 2+ /HPF      Squamous Epithelial Cells, UA 3-6 /HPF      Hyaline Casts, UA 7-12 /LPF      Fine Granular Casts, UA 0-2 /LPF      Mucus, UA Large/3+ /HPF      Methodology Manual Light Microscopy     Comprehensive Metabolic Panel [[id number]]  (Abnormal) Collected:  08/13/19 1309    Specimen:  Blood Updated:  08/13/19 1352     Glucose 122 mg/dL      BUN 25 mg/dL      Creatinine 2.17 mg/dL      Sodium 136 mmol/L      Potassium 4.1 mmol/L      Chloride 100 mmol/L      CO2 22.8 mmol/L      Calcium 8.7 mg/dL      Total Protein 7.4 g/dL      Albumin 3.90 g/dL      ALT (SGPT) 18 U/L      AST (SGOT) 20 U/L      Alkaline Phosphatase 70 U/L      Total Bilirubin 0.2 mg/dL      eGFR Non African Amer 30 mL/min/1.73      Globulin 3.5 gm/dL      A/G Ratio 1.1 g/dL      BUN/Creatinine Ratio 11.5     Anion Gap 13.2 mmol/L     Narrative:       GFR Normal >60  Chronic Kidney Disease <60  Kidney Failure <15    Troponin [[id number]]  (Normal) Collected:  08/13/19 1309    Specimen:  Blood Updated:  08/13/19 1352     Troponin T 0.013 ng/mL     Narrative:       Troponin T Reference Range:  <= 0.03 ng/mL-   Negative for AMI  >0.03 ng/mL-     Abnormal for myocardial necrosis.  Clinicians would have to utilize clinical acumen, EKG, Troponin and serial changes to determine if it is an Acute Myocardial Infarction or myocardial injury due to an underlying chronic condition.     Urinalysis With Microscopic If Indicated (No Culture) - Urine, Catheter [[id number]]  (Abnormal) Collected:  08/13/19 1314    Specimen:  Urine, Catheter Updated:  08/13/19 1337     Color, UA Dark Yellow     Appearance, UA Cloudy     pH, UA <=5.0     Specific Gravity, UA 1.025     Glucose, UA Negative     Ketones, UA Trace     Bilirubin, UA Negative     Blood, UA Trace     Protein, UA 30 mg/dL (1+)     Leuk Esterase, UA Trace     Nitrite, UA Negative     Urobilinogen, UA 0.2 E.U./dL        Imaging Results (last 24 hours)     Procedure Component Value Units Date/Time    CT Head Without Contrast [[id number]] Collected:  08/13/19 1411     Updated:  08/13/19 1418    Narrative:       CT HEAD WO CONTRAST-     INDICATIONS: Syncope, fall injury     TECHNIQUE: Radiation dose  reduction techniques were utilized, including  automated exposure control and exposure modulation based on body size.  Noncontrast head CT     COMPARISON: None available     FINDINGS:           No acute intracranial hemorrhage, midline shift or mass effect. No acute  territorial infarct is identified.. Areas of encephalomalacia/old  infarct changes in the left frontal and occipital lobes.     Mild periventricular hypodensities suggest chronic small vessel ischemic  change in a patient this age.     Arterial calcifications are seen at the base of the brain.     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  Expected ex vacuo dilatation of the anterior horn of the left lateral  ventricle. Ventricles, cisterns, cerebral sulci are otherwise  unremarkable for patient age.     The visualized paranasal sinuses, orbits, mastoid air cells are  unremarkable.                   Impression:          No acute intracranial hemorrhage or hydrocephalus. Chronic changes of  the brain. If there is further clinical concern, MRI could be considered  for further evaluation.     This report was finalized on 8/13/2019 2:15 PM by Dr. Sagar Verduzco M.D.       XR Chest 1 View [[id number]] Collected:  08/13/19 1356     Updated:  08/13/19 1401    Narrative:       XR CHEST 1 VW-     HISTORY: Male who is 74 years-old,  syncope     TECHNIQUE: Frontal view of the chest     COMPARISON: None available     FINDINGS: Heart size is normal. Aorta is tortuous. Pulmonary vasculature  is unremarkable. Sternotomy wires are noted. Slight thickening of the  minor fissure. Minimal atelectasis in the lower lungs. No focal  pulmonary consolidation, pleural effusion, or pneumothorax. No acute  osseous process.       Impression:       No focal pulmonary consolidation. Tortuous aorta. Follow-up  as clinically indicated.     This report was finalized on 8/13/2019 1:58 PM by Dr. Sagar Verduzco M.D.           EKG                               Rhythm/Rate: NSR 86  P waves and MT: nml  QRS, axis: LBBB   ST and T waves: non specific T wave changes        Current Facility-Administered Medications:   •  aspirin EC tablet 81 mg, 81 mg, Oral, Daily, Sourav Oakes MD  •  atorvastatin (LIPITOR) tablet 40 mg, 40 mg, Oral, Nightly, Sourav Oakes MD  •  calcium-vitamin D 500-200 MG-UNIT tablet 1,000 mg, 1,000 mg, Oral, Daily, Sourav Oakes MD  •  [START ON 8/14/2019] cefTRIAXone (ROCEPHIN) IVPB 1 g, 1 g, Intravenous, Q24H, Sourav Oakes MD  •  diltiaZEM (CARDIZEM) tablet 60 mg, 60 mg, Oral, TID, Sourav Oakes MD  •  Divalproex Sodium (DEPAKOTE SPRINKLE) capsule 125 mg, 125 mg, Oral, Q12H, Sourav Oakes MD  •  donepezil (ARICEPT) tablet 10 mg, 10 mg, Oral, Nightly, Sourav Oakes MD  •  iron polysaccharides (NIFEREX) capsule 150 mg, 150 mg, Oral, Daily, Sourav Oakes MD  •  ketotifen (ZADITOR) 0.025 % ophthalmic solution 1 drop, 1 drop, Both Eyes, BID, Sourav Oakes MD  •  lisinopril (PRINIVIL,ZESTRIL) tablet 10 mg, 10 mg, Oral, Daily, Sourav Oakes MD  •  memantine (NAMENDA) tablet 10 mg, 10 mg, Oral, Q12H, Sourav Oakes MD  •  mirtazapine (REMERON) tablet 15 mg, 15 mg, Oral, Nightly, Sourav Oakes MD  •  montelukast (SINGULAIR) tablet 10 mg, 10 mg, Oral, Nightly, Sourav Oakes MD  •  [START ON 8/14/2019] pantoprazole (PROTONIX) EC tablet 40 mg, 40 mg, Oral, QAM, Sourav Oakes MD  •  [COMPLETED] Insert peripheral IV, , , Once **AND** sodium chloride 0.9 % flush 10 mL, 10 mL, Intravenous, PRN, Elder, Mehran D, PA  •  sodium chloride 0.9 % infusion, 100 mL/hr, Intravenous, Continuous, Mehran Robbins PA, Last Rate: 100 mL/hr at 08/13/19 1439, 100 mL/hr at 08/13/19 1439  •  sodium chloride 0.9 % with KCl 20 mEq/L infusion, 75 mL/hr, Intravenous, Continuous, Sourav Oakes MD  •  sulfaSALAzine (AZULFIDINE) tablet 500 mg, 500 mg, Oral, TID, Sourav Oakes MD     ASSESSMENT  Acute kidney injury  Acute UTI  Chronic kidney disease stage  III  Near-syncope  Dehydration  Dementia  Hypertension  Hyperlipidemia  Ulcerative colitis  Gastroesophageal reflux disease    PLAN  Admit  IV fluid  IV antibiotics  Adjust nursing home medications  Nephrology consult   Supportive care  Stress ulcer DVT prophylaxis  Patient is full code  Follow closely further recommendation according to hospital course    LISANDRA ARCHIBALD MD

## 2019-08-13 NOTE — ED TRIAGE NOTES
Patient to ED via EMS from [facility], c/o dizziness, nausea, loss of appetite and not feeling well x1 week, NH staff report syncopal episode this morning, denies head injury. Patient a&ox4, NAD noted upon arrival to ED

## 2019-08-14 ENCOUNTER — APPOINTMENT (OUTPATIENT)
Dept: CARDIOLOGY | Facility: HOSPITAL | Age: 74
End: 2019-08-14

## 2019-08-14 PROBLEM — N39.0 ACUTE UTI (URINARY TRACT INFECTION): Status: ACTIVE | Noted: 2019-08-14

## 2019-08-14 LAB
ALBUMIN SERPL-MCNC: 3.4 G/DL (ref 3.5–5.2)
ALBUMIN/GLOB SERPL: 1.2 G/DL
ALP SERPL-CCNC: 55 U/L (ref 39–117)
ALT SERPL W P-5'-P-CCNC: 14 U/L (ref 1–41)
ANION GAP SERPL CALCULATED.3IONS-SCNC: 14.4 MMOL/L (ref 5–15)
AORTIC DIMENSIONLESS INDEX: 0.5 (DI)
AST SERPL-CCNC: 21 U/L (ref 1–40)
BASOPHILS # BLD AUTO: 0.04 10*3/MM3 (ref 0–0.2)
BASOPHILS NFR BLD AUTO: 0.5 % (ref 0–1.5)
BH CV ECHO MEAS - AO MAX PG: 17.9 MMHG
BH CV ECHO MEAS - AO MEAN PG (FULL): 7 MMHG
BH CV ECHO MEAS - AO MEAN PG: 10 MMHG
BH CV ECHO MEAS - AO ROOT AREA (BSA CORRECTED): 1.6
BH CV ECHO MEAS - AO ROOT AREA: 10.8 CM^2
BH CV ECHO MEAS - AO ROOT DIAM: 3.7 CM
BH CV ECHO MEAS - AO V2 MAX: 211 CM/SEC
BH CV ECHO MEAS - AO V2 MEAN: 149 CM/SEC
BH CV ECHO MEAS - AO V2 VTI: 44.4 CM
BH CV ECHO MEAS - ASC AORTA: 3.3 CM
BH CV ECHO MEAS - AVA(I,A): 1.5 CM^2
BH CV ECHO MEAS - AVA(I,D): 1.5 CM^2
BH CV ECHO MEAS - BSA(HAYCOCK): 2.4 M^2
BH CV ECHO MEAS - BSA: 2.3 M^2
BH CV ECHO MEAS - BZI_BMI: 33.2 KILOGRAMS/M^2
BH CV ECHO MEAS - BZI_METRIC_HEIGHT: 182 CM
BH CV ECHO MEAS - BZI_METRIC_WEIGHT: 110 KG
BH CV ECHO MEAS - EDV(CUBED): 50.7 ML
BH CV ECHO MEAS - EDV(MOD-SP2): 67 ML
BH CV ECHO MEAS - EDV(MOD-SP4): 137 ML
BH CV ECHO MEAS - EDV(TEICH): 58.1 ML
BH CV ECHO MEAS - EF(MOD-BP): 50 %
BH CV ECHO MEAS - EF(MOD-SP2): 28.4 %
BH CV ECHO MEAS - EF(MOD-SP4): 50.4 %
BH CV ECHO MEAS - ESV(MOD-SP2): 48 ML
BH CV ECHO MEAS - ESV(MOD-SP4): 68 ML
BH CV ECHO MEAS - IVS/LVPW: 1.4
BH CV ECHO MEAS - IVSD: 1.1 CM
BH CV ECHO MEAS - LAT PEAK E' VEL: 9.5 CM/SEC
BH CV ECHO MEAS - LV DIASTOLIC VOL/BSA (35-75): 59.5 ML/M^2
BH CV ECHO MEAS - LV MASS(C)D: 104.6 GRAMS
BH CV ECHO MEAS - LV MASS(C)DI: 45.4 GRAMS/M^2
BH CV ECHO MEAS - LV MAX PG: 5.9 MMHG
BH CV ECHO MEAS - LV MEAN PG: 3 MMHG
BH CV ECHO MEAS - LV SYSTOLIC VOL/BSA (12-30): 29.5 ML/M^2
BH CV ECHO MEAS - LV V1 MAX: 121 CM/SEC
BH CV ECHO MEAS - LV V1 MEAN: 78.1 CM/SEC
BH CV ECHO MEAS - LV V1 VTI: 21.1 CM
BH CV ECHO MEAS - LVIDD: 3.7 CM
BH CV ECHO MEAS - LVLD AP2: 8.3 CM
BH CV ECHO MEAS - LVLD AP4: 9.1 CM
BH CV ECHO MEAS - LVLS AP2: 7.5 CM
BH CV ECHO MEAS - LVLS AP4: 8.1 CM
BH CV ECHO MEAS - LVOT AREA (M): 3.1 CM^2
BH CV ECHO MEAS - LVOT AREA: 3.1 CM^2
BH CV ECHO MEAS - LVOT DIAM: 2 CM
BH CV ECHO MEAS - LVPWD: 0.8 CM
BH CV ECHO MEAS - MED PEAK E' VEL: 6.3 CM/SEC
BH CV ECHO MEAS - MV A DUR: 169 SEC
BH CV ECHO MEAS - MV A MAX VEL: 95.8 CM/SEC
BH CV ECHO MEAS - MV DEC SLOPE: 426 CM/SEC^2
BH CV ECHO MEAS - MV DEC TIME: 190 SEC
BH CV ECHO MEAS - MV E MAX VEL: 41.5 CM/SEC
BH CV ECHO MEAS - MV E/A: 0.43
BH CV ECHO MEAS - MV MEAN PG: 2 MMHG
BH CV ECHO MEAS - MV P1/2T MAX VEL: 70.9 CM/SEC
BH CV ECHO MEAS - MV P1/2T: 48.7 MSEC
BH CV ECHO MEAS - MV V2 MEAN: 57.7 CM/SEC
BH CV ECHO MEAS - MV V2 VTI: 26.4 CM
BH CV ECHO MEAS - MVA P1/2T LCG: 3.1 CM^2
BH CV ECHO MEAS - MVA(P1/2T): 4.5 CM^2
BH CV ECHO MEAS - MVA(VTI): 2.5 CM^2
BH CV ECHO MEAS - PA ACC SLOPE: 1195 CM/SEC^2
BH CV ECHO MEAS - PA ACC TIME: 0.07 SEC
BH CV ECHO MEAS - PA MAX PG (FULL): 3.5 MMHG
BH CV ECHO MEAS - PA MAX PG: 6 MMHG
BH CV ECHO MEAS - PA PR(ACCEL): 48.9 MMHG
BH CV ECHO MEAS - PA V2 MAX: 122 CM/SEC
BH CV ECHO MEAS - PULM A REVS DUR: 0.1 SEC
BH CV ECHO MEAS - PULM A REVS VEL: 22.8 CM/SEC
BH CV ECHO MEAS - PULM DIAS VEL: 31.6 CM/SEC
BH CV ECHO MEAS - PULM S/D: 0.71
BH CV ECHO MEAS - PULM SYS VEL: 22.3 CM/SEC
BH CV ECHO MEAS - PVA(V,A): 3.7 CM^2
BH CV ECHO MEAS - PVA(V,D): 3.7 CM^2
BH CV ECHO MEAS - QP/QS: 1.2
BH CV ECHO MEAS - RV MAX PG: 2.4 MMHG
BH CV ECHO MEAS - RV MEAN PG: 1 MMHG
BH CV ECHO MEAS - RV V1 MAX: 78.2 CM/SEC
BH CV ECHO MEAS - RV V1 MEAN: 47 CM/SEC
BH CV ECHO MEAS - RV V1 VTI: 13.5 CM
BH CV ECHO MEAS - RVOT AREA: 5.7 CM^2
BH CV ECHO MEAS - RVOT DIAM: 2.7 CM
BH CV ECHO MEAS - SI(AO): 207.2 ML/M^2
BH CV ECHO MEAS - SI(LVOT): 28.8 ML/M^2
BH CV ECHO MEAS - SI(MOD-SP2): 8.2 ML/M^2
BH CV ECHO MEAS - SI(MOD-SP4): 29.9 ML/M^2
BH CV ECHO MEAS - SV(AO): 477.4 ML
BH CV ECHO MEAS - SV(LVOT): 66.3 ML
BH CV ECHO MEAS - SV(MOD-SP2): 19 ML
BH CV ECHO MEAS - SV(MOD-SP4): 69 ML
BH CV ECHO MEAS - SV(RVOT): 77.3 ML
BH CV ECHO MEAS - TAPSE (>1.6): 1.7 CM2
BH CV ECHO MEASUREMENTS AVERAGE E/E' RATIO: 5.25
BH CV XLRA - RV BASE: 2.9 CM
BH CV XLRA - TDI S': 11 CM/SEC
BILIRUB SERPL-MCNC: 0.2 MG/DL (ref 0.2–1.2)
BUN BLD-MCNC: 25 MG/DL (ref 8–23)
BUN/CREAT SERPL: 12.5 (ref 7–25)
CALCIUM SPEC-SCNC: 8.4 MG/DL (ref 8.6–10.5)
CHLORIDE SERPL-SCNC: 107 MMOL/L (ref 98–107)
CHOLEST SERPL-MCNC: 127 MG/DL (ref 0–200)
CO2 SERPL-SCNC: 17.6 MMOL/L (ref 22–29)
CREAT BLD-MCNC: 2 MG/DL (ref 0.76–1.27)
DEPRECATED RDW RBC AUTO: 58.9 FL (ref 37–54)
EOSINOPHIL # BLD AUTO: 0.06 10*3/MM3 (ref 0–0.4)
EOSINOPHIL NFR BLD AUTO: 0.8 % (ref 0.3–6.2)
ERYTHROCYTE [DISTWIDTH] IN BLOOD BY AUTOMATED COUNT: 17.7 % (ref 12.3–15.4)
GFR SERPL CREATININE-BSD FRML MDRD: 33 ML/MIN/1.73
GLOBULIN UR ELPH-MCNC: 2.9 GM/DL
GLUCOSE BLD-MCNC: 81 MG/DL (ref 65–99)
HBA1C MFR BLD: 5.8 % (ref 4.8–5.6)
HCT VFR BLD AUTO: 42.8 % (ref 37.5–51)
HDLC SERPL-MCNC: 29 MG/DL (ref 40–60)
HGB BLD-MCNC: 12.5 G/DL (ref 13–17.7)
IMM GRANULOCYTES # BLD AUTO: 0.03 10*3/MM3 (ref 0–0.05)
IMM GRANULOCYTES NFR BLD AUTO: 0.4 % (ref 0–0.5)
LDLC SERPL CALC-MCNC: 68 MG/DL (ref 0–100)
LDLC/HDLC SERPL: 2.33 {RATIO}
LEFT ATRIUM VOLUME INDEX: 31 ML/M2
LYMPHOCYTES # BLD AUTO: 1.86 10*3/MM3 (ref 0.7–3.1)
LYMPHOCYTES NFR BLD AUTO: 23.7 % (ref 19.6–45.3)
MAGNESIUM SERPL-MCNC: 1.9 MG/DL (ref 1.6–2.4)
MAXIMAL PREDICTED HEART RATE: 145 BPM
MCH RBC QN AUTO: 26.8 PG (ref 26.6–33)
MCHC RBC AUTO-ENTMCNC: 29.2 G/DL (ref 31.5–35.7)
MCV RBC AUTO: 91.6 FL (ref 79–97)
MONOCYTES # BLD AUTO: 0.77 10*3/MM3 (ref 0.1–0.9)
MONOCYTES NFR BLD AUTO: 9.8 % (ref 5–12)
NEUTROPHILS # BLD AUTO: 5.09 10*3/MM3 (ref 1.7–7)
NEUTROPHILS NFR BLD AUTO: 64.8 % (ref 42.7–76)
NRBC BLD AUTO-RTO: 0 /100 WBC (ref 0–0.2)
NT-PROBNP SERPL-MCNC: 210.8 PG/ML (ref 5–1800)
PLATELET # BLD AUTO: 281 10*3/MM3 (ref 140–450)
PMV BLD AUTO: 10.3 FL (ref 6–12)
POTASSIUM BLD-SCNC: 4.3 MMOL/L (ref 3.5–5.2)
PROT SERPL-MCNC: 6.3 G/DL (ref 6–8.5)
RBC # BLD AUTO: 4.67 10*6/MM3 (ref 4.14–5.8)
SODIUM BLD-SCNC: 139 MMOL/L (ref 136–145)
STRESS TARGET HR: 123 BPM
TRIGL SERPL-MCNC: 152 MG/DL (ref 0–150)
TSH SERPL DL<=0.05 MIU/L-ACNC: 0.73 MIU/ML (ref 0.27–4.2)
VLDLC SERPL-MCNC: 30.4 MG/DL (ref 5–40)
WBC NRBC COR # BLD: 7.85 10*3/MM3 (ref 3.4–10.8)

## 2019-08-14 PROCEDURE — G0378 HOSPITAL OBSERVATION PER HR: HCPCS

## 2019-08-14 PROCEDURE — 84443 ASSAY THYROID STIM HORMONE: CPT | Performed by: HOSPITALIST

## 2019-08-14 PROCEDURE — 25010000002 PERFLUTREN (DEFINITY) 8.476 MG IN SODIUM CHLORIDE 0.9 % 10 ML INJECTION: Performed by: HOSPITALIST

## 2019-08-14 PROCEDURE — 83036 HEMOGLOBIN GLYCOSYLATED A1C: CPT | Performed by: HOSPITALIST

## 2019-08-14 PROCEDURE — 93306 TTE W/DOPPLER COMPLETE: CPT | Performed by: INTERNAL MEDICINE

## 2019-08-14 PROCEDURE — 92610 EVALUATE SWALLOWING FUNCTION: CPT

## 2019-08-14 PROCEDURE — 80053 COMPREHEN METABOLIC PANEL: CPT | Performed by: HOSPITALIST

## 2019-08-14 PROCEDURE — 25010000002 ENOXAPARIN PER 10 MG: Performed by: HOSPITALIST

## 2019-08-14 PROCEDURE — 83735 ASSAY OF MAGNESIUM: CPT | Performed by: HOSPITALIST

## 2019-08-14 PROCEDURE — 83880 ASSAY OF NATRIURETIC PEPTIDE: CPT | Performed by: HOSPITALIST

## 2019-08-14 PROCEDURE — 99243 OFF/OP CNSLTJ NEW/EST LOW 30: CPT | Performed by: INTERNAL MEDICINE

## 2019-08-14 PROCEDURE — 93306 TTE W/DOPPLER COMPLETE: CPT

## 2019-08-14 PROCEDURE — 25810000003 SODIUM CHLORIDE 0.9 % WITH KCL 20 MEQ 20-0.9 MEQ/L-% SOLUTION: Performed by: HOSPITALIST

## 2019-08-14 PROCEDURE — 85025 COMPLETE CBC W/AUTO DIFF WBC: CPT | Performed by: HOSPITALIST

## 2019-08-14 PROCEDURE — 80061 LIPID PANEL: CPT | Performed by: HOSPITALIST

## 2019-08-14 PROCEDURE — 25010000002 CEFTRIAXONE PER 250 MG: Performed by: HOSPITALIST

## 2019-08-14 RX ADMIN — ASPIRIN 81 MG: 81 TABLET, COATED ORAL at 11:22

## 2019-08-14 RX ADMIN — DILTIAZEM HYDROCHLORIDE 60 MG: 60 TABLET, FILM COATED ORAL at 11:24

## 2019-08-14 RX ADMIN — MIRTAZAPINE 15 MG: 15 TABLET, FILM COATED ORAL at 20:16

## 2019-08-14 RX ADMIN — PERFLUTREN 2 ML: 6.52 INJECTION, SUSPENSION INTRAVENOUS at 10:00

## 2019-08-14 RX ADMIN — DONEPEZIL HYDROCHLORIDE 10 MG: 10 TABLET, FILM COATED ORAL at 20:16

## 2019-08-14 RX ADMIN — ENOXAPARIN SODIUM 30 MG: 30 INJECTION SUBCUTANEOUS at 16:48

## 2019-08-14 RX ADMIN — SULFASALAZINE 500 MG: 500 TABLET ORAL at 20:16

## 2019-08-14 RX ADMIN — KETOTIFEN FUMARATE 1 DROP: 0.35 SOLUTION/ DROPS OPHTHALMIC at 11:21

## 2019-08-14 RX ADMIN — DIVALPROEX SODIUM 125 MG: 125 CAPSULE, COATED PELLETS ORAL at 20:16

## 2019-08-14 RX ADMIN — ATORVASTATIN CALCIUM 10 MG: 10 TABLET, FILM COATED ORAL at 20:16

## 2019-08-14 RX ADMIN — MEMANTINE HYDROCHLORIDE 10 MG: 10 TABLET, FILM COATED ORAL at 20:16

## 2019-08-14 RX ADMIN — MEMANTINE HYDROCHLORIDE 10 MG: 10 TABLET, FILM COATED ORAL at 11:21

## 2019-08-14 RX ADMIN — PANTOPRAZOLE SODIUM 40 MG: 40 TABLET, DELAYED RELEASE ORAL at 06:05

## 2019-08-14 RX ADMIN — DILTIAZEM HYDROCHLORIDE 60 MG: 60 TABLET, FILM COATED ORAL at 15:32

## 2019-08-14 RX ADMIN — CEFTRIAXONE SODIUM 1 G: 1 INJECTION, SOLUTION INTRAVENOUS at 14:57

## 2019-08-14 RX ADMIN — POTASSIUM CHLORIDE AND SODIUM CHLORIDE 75 ML/HR: 900; 150 INJECTION, SOLUTION INTRAVENOUS at 13:22

## 2019-08-14 RX ADMIN — SULFASALAZINE 500 MG: 500 TABLET ORAL at 11:22

## 2019-08-14 RX ADMIN — DILTIAZEM HYDROCHLORIDE 60 MG: 60 TABLET, FILM COATED ORAL at 20:16

## 2019-08-14 RX ADMIN — SULFASALAZINE 500 MG: 500 TABLET ORAL at 15:32

## 2019-08-14 RX ADMIN — DIVALPROEX SODIUM 125 MG: 125 CAPSULE, COATED PELLETS ORAL at 11:25

## 2019-08-14 RX ADMIN — CALCIUM CARBONATE-VITAMIN D TAB 500 MG-200 UNIT 1000 MG: 500-200 TAB at 11:21

## 2019-08-14 NOTE — THERAPY EVALUATION
Acute Care - Speech Language Pathology   Swallow Initial Evaluation [facility]     Patient Name: Edson Myers  : 1945  MRN: [id number]  Today's Date: 2019               Admit Date: 2019    Visit Dx:     ICD-10-CM ICD-9-CM   1. Syncope, unspecified syncope type R55 780.2   2. Acute UTI N39.0 599.0   3. KELLY (acute kidney injury) (CMS/HCC) N17.9 584.9     Patient Active Problem List   Diagnosis   • Syncope     Past Medical History:   Diagnosis Date   • Acute kidney injury (CMS/HCC)    • Altered mental status, unspecified    • Anemia    • Arthritis     bilateral hands   • Aspiration pneumonia (CMS/HCC)    • Atrial fibrillation (CMS/HCC)    • Carotid stenosis, bilateral    • Coronary artery disease    • Dementia    • Depression    • Diabetes mellitus (CMS/HCC)    • Dysphagia    • Encephalopathy acute    • GERD (gastroesophageal reflux disease)    • Hyperlipidemia    • Hypertension    • Hypocalcemia    • Left bundle branch block    • Leukocytosis    • LV dysfunction    • Septic shock (CMS/HCC)    • Stroke (CMS/HCC)    • Syncope    • Ulcerative colitis (CMS/HCC)      Past Surgical History:   Procedure Laterality Date   • CARDIAC SURGERY     • GTUBE INSERTION     • KNEE SURGERY Left 1960        SWALLOW EVALUATION (last 72 hours)      SLP Adult Swallow Evaluation     Row Name 19 1330          Document Type  evaluation  -OC    Subjective Information  no complaints  -OC    Patient Observations  alert;cooperative;agree to therapy  -OC    Patient Effort  good  -OC    Symptoms Noted During/After Treatment  none  -OC          Patient Profile Reviewed  yes  -OC    Pertinent History Of Current Problem  Pt admitted for syncope, KELLY, UTI. Hx dementia.   -OC    Current Method of Nutrition  regular textures;thin liquids  -OC    Precautions/Limitations, Vision  WFL  -OC    Precautions/Limitations, Hearing  WFL  -OC    Prior Level of Function-Communication  cognitive-linguistic impairment  -OC    Prior Level  of Function-Swallowing  no diet consistency restrictions  -OC    Plans/Goals Discussed with  patient;agreed upon  -OC    Barriers to Rehab  none identified  -OC    Patient's Goals for Discharge  patient did not state  -OC          Additional Documentation  Pain Scale: Numbers Pre/Post-Treatment (Group)  -OC          Pain Scale: Numbers, Pretreatment  0/10 - no pain  -OC    Pain Scale: Numbers, Post-Treatment  0/10 - no pain  -OC          Dentition Assessment  upper dentures/partial in place;lower dentures/partial in place  -OC    Secretion Management  WNL/WFL  -OC    Mucosal Quality  moist, healthy  -OC    Volitional Swallow  WFL  -OC          Oral Motor General Assessment  WFL;other (see comments) assymetry noted  -OC          Oral Prep Phase  WFL  -OC    Oral Transit  WFL  -OC    Oral Residue  WFL  -OC    Pharyngeal Phase  no overt signs/symptoms of pharyngeal impairment  -OC    Clinical Swallow Evaluation Summary  Pt demonstrated no overt s/s aspiration with thin via cup and straw, puree, mech soft, and regular textures.  -OC          SLP Swallowing Diagnosis  functional oral phase;functional pharyngeal phase  -OC    Functional Impact  no impact on function  -OC    Rehab Potential/Prognosis, Swallowing  good, to achieve stated therapy goals  -OC    Swallow Criteria for Skilled Therapeutic Interventions Met  baseline status;no problems identified which require skilled intervention  -OC          Therapy Frequency (Swallow)  evaluation only  -OC    Predicted Duration Therapy Intervention (Days)  until discharge  -OC    SLP Diet Recommendation  regular textures;thin liquids  -OC    Recommended Precautions and Strategies  upright posture during/after eating;small bites of food and sips of liquid  -OC    SLP Rec. for Method of Medication Administration  meds whole;with pudding or applesauce;with thin liquids  -OC    Monitor for Signs of Aspiration  yes;notify SLP if any concerns  -OC    Anticipated Dischage Disposition   unknown;other (see comments) no further ST anticipated  -OC      User Key  (r) = Recorded By, (t) = Taken By, (c) = Cosigned By    Initials Name Effective Dates    Esther Bates MA,[facility]-SLP 06/08/18 -           EDUCATION  The patient has been educated in the following areas:   Dysphagia (Swallowing Impairment).    SLP Recommendation and Plan  SLP Swallowing Diagnosis: functional oral phase, functional pharyngeal phase  SLP Diet Recommendation: regular textures, thin liquids  Recommended Precautions and Strategies: upright posture during/after eating, small bites of food and sips of liquid  SLP Rec. for Method of Medication Administration: meds whole, with pudding or applesauce, with thin liquids     Monitor for Signs of Aspiration: yes, notify SLP if any concerns     Swallow Criteria for Skilled Therapeutic Interventions Met: baseline status, no problems identified which require skilled intervention  Anticipated Dischage Disposition: unknown, other (see comments)  Rehab Potential/Prognosis, Swallowing: good, to achieve stated therapy goals  Therapy Frequency (Swallow): evaluation only  Predicted Duration Therapy Intervention (Days): until discharge       Plan of Care Reviewed With: patient  Plan of Care Review  Plan of Care Reviewed With: patient  Outcome Summary: Bedside swallow eval completed. Recommend continue with regular and thins, meds whole with thin or puree. Recommend slow rate and upright for PO. ST to s/o at this time, please re-consult as warranted.          SLP Outcome Measures (last 72 hours)      SLP Outcome Measures     Row Name 08/14/19 8581             SLP Outcome Measures    Outcome Measure Used?  Adult NOMS  -OC         Adult FCM Scores    FCM Chosen  Swallowing  -OC      Swallowing FCM Score  7  -OC        User Key  (r) = Recorded By, (t) = Taken By, (c) = Cosigned By    Initials Name Effective Dates    Esther Bates MA,SAMMIE-SLP 06/08/18 -            Time Calculation:   Time Calculation-  SLP     Row Name 08/14/19 1526             Time Calculation- SLP    SLP Start Time  1330  -OC      SLP Received On  08/14/19  -OC        User Key  (r) = Recorded By, (t) = Taken By, (c) = Cosigned By    Initials Name Provider Type    OC Esther Marr MA,CCC-SLP Speech and Language Pathologist          Therapy Charges for Today     Code Description Service Date Service Provider Modifiers Qty    [id number] HC ST EVAL ORAL PHARYNG SWALLOW 4 8/14/2019 Esther Marr MA,CCC-SLP GN 1               Esther Marr MA,SAMMIE-SLP  8/14/2019

## 2019-08-14 NOTE — PLAN OF CARE
Problem: Patient Care Overview  Goal: Plan of Care Review  Outcome: Ongoing (interventions implemented as appropriate)   08/14/19 0554   Coping/Psychosocial   Plan of Care Reviewed With patient   Plan of Care Review   Progress no change     Goal: Individualization and Mutuality  Outcome: Ongoing (interventions implemented as appropriate)    Goal: Discharge Needs Assessment  Outcome: Ongoing (interventions implemented as appropriate)    Goal: Interprofessional Rounds/Family Conf  Outcome: Ongoing (interventions implemented as appropriate)      Problem: Syncope (Adult)  Goal: Identify Related Risk Factors and Signs and Symptoms  Outcome: Ongoing (interventions implemented as appropriate)    Goal: Physical Safety/Health Maintenance  Outcome: Ongoing (interventions implemented as appropriate)    Goal: Optimal Emotional/Functional [place]  Outcome: Ongoing (interventions implemented as appropriate)      Problem: Fall Risk (Adult)  Goal: Identify Related Risk Factors and Signs and Symptoms  Outcome: Ongoing (interventions implemented as appropriate)    Goal: Absence of Fall  Outcome: Ongoing (interventions implemented as appropriate)

## 2019-08-14 NOTE — PROGRESS NOTES
"Daily progress note    Chief complaint  Doing little better  No specific complaint except generalized weakness  Still does not want to eat    History of present illness  74-year-old white male with history of dementia ulcerative colitis hypertension hyperlipidemia and chronic kidney disease stage III who is a nursing home resident brought to the emergency room with generalized weakness for last few days with no appetite.  Patient denies any chest pain shortness of breath abdominal pain nausea vomiting but he does have chronic diarrhea and he almost passed out while taking shower.  Patient evaluated in ER found to be in acute kidney injury on top of chronic kidney disease also found to have UTI admit for management.  Patient fully alert oriented no specific complaint except does not want to eat and is still feeling very weak.  No other complaints.     REVIEW OF SYSTEMS  Constitutional: Positive for fatigue. Negative for activity change, appetite change and fever.   HENT: Negative for congestion and sore throat.    Eyes: Negative.    Respiratory: Negative for cough and shortness of breath.    Cardiovascular: Negative for chest pain and leg swelling.   Gastrointestinal: Positive for nausea. Negative for abdominal pain, diarrhea and vomiting.   Genitourinary: Negative for decreased urine volume and dysuria.   Musculoskeletal: Negative for neck pain.   Skin: Negative for rash and wound.   Neurological: Positive for syncope (1 episode) and weakness (generalized ). Negative for numbness and headaches.   Psychiatric/Behavioral: Negative.    All other systems reviewed and are negative.     PHYSICAL EXAM  Blood pressure 153/81, pulse 88, temperature 97.9 °F (36.6 °C), temperature source Oral, resp. rate 18, height 182 cm (71.65\"), weight 110 kg (242 lb 8.1 oz), SpO2 96 %.    Constitutional: He is oriented to person, place, and time. No distress.   Head: Normocephalic and atraumatic.   Eyes: EOM are normal. Pupils are equal, " round, and reactive to light.   Neck: Normal range of motion. Neck supple.   Cardiovascular: Normal rate, regular rhythm and normal heart sounds.   Pulmonary/Chest: Effort normal and breath sounds normal. No respiratory distress.   Abdominal: Soft. There is no tenderness. There is no rebound and no guarding.   Musculoskeletal: Normal range of motion. He exhibits no edema.   Neurological: He is alert and oriented to person, place, and time. He has normal sensation and normal strength.   Skin: Skin is warm and dry.   Psychiatric: Mood and affect normal.      LAB RESULTS  Lab Results (last 24 hours)     Procedure Component Value Units Date/Time    Magnesium [[id number]]  (Normal) Collected:  08/14/19 0333    Specimen:  Blood Updated:  08/14/19 1031     Magnesium 1.9 mg/dL     Urine Culture - Urine, Urine, Catheter [[id number]]  (Normal) Collected:  08/13/19 1314    Specimen:  Urine, Catheter Updated:  08/14/19 0854     Urine Culture No growth    BNP [[id number]]  (Normal) Collected:  08/14/19 0333    Specimen:  Blood Updated:  08/14/19 0522     proBNP 210.8 pg/mL     Narrative:       Among patients with dyspnea, NT-proBNP is highly sensitive for the detection of acute congestive heart failure. In addition NT-proBNP of <300 pg/ml effectively rules out acute congestive heart failure with 99% negative predictive value.    TSH [[id number]]  (Normal) Collected:  08/14/19 0333    Specimen:  Blood Updated:  08/14/19 0522     TSH 0.732 mIU/mL     Comprehensive Metabolic Panel [[id number]]  (Abnormal) Collected:  08/14/19 0333    Specimen:  Blood Updated:  08/14/19 0511     Glucose 81 mg/dL      BUN 25 mg/dL      Creatinine 2.00 mg/dL      Sodium 139 mmol/L      Potassium 4.3 mmol/L      Chloride 107 mmol/L      CO2 17.6 mmol/L      Calcium 8.4 mg/dL      Total Protein 6.3 g/dL      Albumin 3.40 g/dL      ALT (SGPT) 14 U/L      AST (SGOT) 21 U/L      Alkaline Phosphatase 55 U/L      Total Bilirubin 0.2 mg/dL      eGFR Non   Amer 33 mL/min/1.73      Globulin 2.9 gm/dL      A/G Ratio 1.2 g/dL      BUN/Creatinine Ratio 12.5     Anion Gap 14.4 mmol/L     Narrative:       GFR Normal >60  Chronic Kidney Disease <60  Kidney Failure <15    Lipid Panel [[id number]]  (Abnormal) Collected:  08/14/19 0333    Specimen:  Blood Updated:  08/14/19 0510     Total Cholesterol 127 mg/dL      Triglycerides 152 mg/dL      HDL Cholesterol 29 mg/dL      LDL Cholesterol  68 mg/dL      VLDL Cholesterol 30.4 mg/dL      LDL/HDL Ratio 2.33    Narrative:       Cholesterol Reference Ranges  (U.S. Department of Health and Human Services ATP III Classifications)    Desirable          <200 mg/dL  Borderline High    200-239 mg/dL  High Risk          >240 mg/dL      Triglyceride Reference Ranges  (U.S. Department of Health and Human Services ATP III Classifications)    Normal           <150 mg/dL  Borderline High  150-199 mg/dL  High             200-499 mg/dL  Very High        >500 mg/dL    HDL Reference Ranges  (U.S. Department of Health and Human Services ATP III Classifcations)    Low     <40 mg/dl (major risk factor for CHD)  High    >60 mg/dl ('negative' risk factor for CHD)        LDL Reference Ranges  (U.S. Department of Health and Human Services ATP III Classifcations)    Optimal          <100 mg/dL  Near Optimal     100-129 mg/dL  Borderline High  130-159 mg/dL  High             160-189 mg/dL  Very High        >189 mg/dL    Hemoglobin A1c [[id number]]  (Abnormal) Collected:  08/14/19 0333    Specimen:  Blood Updated:  08/14/19 0449     Hemoglobin A1C 5.80 %     Narrative:       Hemoglobin A1C Ranges:    Increased Risk for Diabetes  5.7% to 6.4%  Diabetes                     >= 6.5%  Diabetic Goal                < 7.0%    CBC & Differential [[id number]] Collected:  08/14/19 0333    Specimen:  Blood Updated:  08/14/19 0443    Narrative:       The following orders were created for panel order CBC & Differential.  Procedure                               Abnormality          Status                     ---------                               -----------         ------                     CBC Auto Differential[[id number]]        Abnormal            Final result                 Please view results for these tests on the individual orders.    CBC Auto Differential [[id number]]  (Abnormal) Collected:  08/14/19 0333    Specimen:  Blood Updated:  08/14/19 0443     WBC 7.85 10*3/mm3      RBC 4.67 10*6/mm3      Hemoglobin 12.5 g/dL      Hematocrit 42.8 %      MCV 91.6 fL      MCH 26.8 pg      MCHC 29.2 g/dL      RDW 17.7 %      RDW-SD 58.9 fl      MPV 10.3 fL      Platelets 281 10*3/mm3      Neutrophil % 64.8 %      Lymphocyte % 23.7 %      Monocyte % 9.8 %      Eosinophil % 0.8 %      Basophil % 0.5 %      Immature Grans % 0.4 %      Neutrophils, Absolute 5.09 10*3/mm3      Lymphocytes, Absolute 1.86 10*3/mm3      Monocytes, Absolute 0.77 10*3/mm3      Eosinophils, Absolute 0.06 10*3/mm3      Basophils, Absolute 0.04 10*3/mm3      Immature Grans, Absolute 0.03 10*3/mm3      nRBC 0.0 /100 WBC         Imaging Results (last 24 hours)     Procedure Component Value Units Date/Time    US Renal Bilateral [[id number]] Collected:  08/13/19 2226     Updated:  08/13/19 2226    Narrative:       BILATERAL RENAL ULTRASOUND     CLINICAL HISTORY: KELLY; R55-Syncope and collapse; N39.0-Urinary tract  infection, site not specified; N17.9-Acute kidney failure, unspecified     FINDINGS: Longitudinal and transverse images of the kidneys were  obtained. The right kidney measures 10.2 cm in length. The left kidney  measures 11.5 cm in length. There is cortical renal atrophy bilaterally.  There are 2 cystic-appearing right renal lesions. The larger measures 3  cm. The smaller 1.7 cm. No solid or suspicious mass. No hydronephrosis.  Bladder poorly distended and not well seen.          Impression:        Bilateral atrophy with 2 right-sided cystic lesions.              EKG                              Rhythm/Rate: NSR  86  P waves and TX: nml  QRS, axis: LBBB   ST and T waves: non specific T wave changes        Current Facility-Administered Medications:   •  aspirin EC tablet 81 mg, 81 mg, Oral, Daily, Sourav Oakes MD, 81 mg at 08/14/19 1122  •  atorvastatin (LIPITOR) tablet 10 mg, 10 mg, Oral, Nightly, Sourav Oakes MD, 10 mg at 08/13/19 2030  •  calcium-vitamin D 500-200 MG-UNIT tablet 1,000 mg, 1,000 mg, Oral, Daily, Sourav Oakes MD, 1,000 mg at 08/14/19 1121  •  cefTRIAXone (ROCEPHIN) IVPB 1 g, 1 g, Intravenous, Q24H, Sourav Oakes MD, Last Rate: 100 mL/hr at 08/14/19 1457, 1 g at 08/14/19 1457  •  diltiaZEM (CARDIZEM) tablet 60 mg, 60 mg, Oral, TID, Sourav Oakes MD, 60 mg at 08/14/19 1532  •  Divalproex Sodium (DEPAKOTE SPRINKLE) capsule 125 mg, 125 mg, Oral, Q12H, Sourav Oakes MD, 125 mg at 08/14/19 1125  •  donepezil (ARICEPT) tablet 10 mg, 10 mg, Oral, Nightly, Sourav Oakes MD, 10 mg at 08/13/19 2030  •  ketotifen (ZADITOR) 0.025 % ophthalmic solution 1 drop, 1 drop, Both Eyes, BID, Sourav Oakes MD, 1 drop at 08/14/19 1121  •  memantine (NAMENDA) tablet 10 mg, 10 mg, Oral, Q12H, Sourav aOkes MD, 10 mg at 08/14/19 1121  •  mirtazapine (REMERON) tablet 15 mg, 15 mg, Oral, Nightly, Sourav Oakes MD, 15 mg at 08/13/19 2029  •  pantoprazole (PROTONIX) EC tablet 40 mg, 40 mg, Oral, QAM, Sourav Oakes MD, 40 mg at 08/14/19 0605  •  [COMPLETED] Insert peripheral IV, , , Once **AND** sodium chloride 0.9 % flush 10 mL, 10 mL, Intravenous, PRN, Mehran Robbins PA  •  sodium chloride 0.9 % with KCl 20 mEq/L infusion, 75 mL/hr, Intravenous, Continuous, Sourav Oakes MD, Last Rate: 75 mL/hr at 08/14/19 1322, 75 mL/hr at 08/14/19 1322  •  sulfaSALAzine (AZULFIDINE) tablet 500 mg, 500 mg, Oral, TID, Sourav Oakes MD, 500 mg at 08/14/19 1532     ASSESSMENT  Acute kidney injury  Acute UTI  Chronic kidney disease stage III  Near-syncope  Paroxysmal atrial fibrillation with rapid ventricular  rate  Dehydration  Dementia  Hypertension  Hyperlipidemia  Ulcerative colitis  Gastroesophageal reflux disease    PLAN  CPM  IV fluid  IV antibiotics  Adjust nursing home medications  Nephrology consult appreciated  Cardiology consult  Supportive care  Stress ulcer DVT prophylaxis  PT/OT  Follow closely further recommendation according to hospital course    LISANDRA ARCHIBALD MD

## 2019-08-14 NOTE — PLAN OF CARE
Problem: Patient Care Overview  Goal: Plan of Care Review  Outcome: Ongoing (interventions implemented as appropriate)   08/14/19 1825   Coping/Psychosocial   Plan of Care Reviewed With patient   Plan of Care Review   Progress no change       Problem: Syncope (Adult)  Goal: Physical Safety/Health Maintenance  Outcome: Ongoing (interventions implemented as appropriate)    Goal: Optimal Emotional/Functional [place]  Outcome: Ongoing (interventions implemented as appropriate)      Problem: Fall Risk (Adult)  Goal: Absence of Fall  Outcome: Ongoing (interventions implemented as appropriate)

## 2019-08-14 NOTE — PLAN OF CARE
Problem: Patient Care Overview  Goal: Plan of Care Review  Outcome: Ongoing (interventions implemented as appropriate)   08/14/19 6117   Coping/Psychosocial   Plan of Care Reviewed With patient   OTHER   Outcome Summary Bedside swallow eval completed. Recommend continue with regular and thins, meds whole with thin or puree. Recommend slow rate and upright for PO. ST to s/o at this time, please re-consult as warranted.

## 2019-08-14 NOTE — CONSULTS
[facility]  Cardiology Consult Note    Patient Identification:  Name: Edson Myers  Age: 74 y.o.  Sex: male  :  1945  MRN: [id number]             Requesting Physician: Dr. Sourav Oakes    Reason for Consultation / Chief Complaint: CAF with RVR    History of Present Illness:   Edson Myers is a 74-year-old male, who is new to our service.  He has a history of dementia, ulcerative colitis, hypertension, LBBB, hyperlipidemia, and chronic kidney disease stage III.  His history from nursing home it stated unspecified atrial fibrillation. He was brought to [facility] today by EMS due to generalized weakness and no appetite in the last few days.  Per attending's note he has chronic diarrhea and he almost passed out while taking a shower.  On admit proBNP is 210.8 sodium 139, potassium 4.3, creatinine 2.00, bun 25, ALT/AST WNL, mag 1.9, WBC 7.85, hemoglobin 12.5, hematocrit 42.8, trop.  Neg., and platelets 281. TSH 0.732, , HDL 29, LDL 68, and triglycerides 152. He is currently in sinus rhythm.    Comorbid cardiac risk factors: HTN, HLD, CKDIII    Past Medical History:  Past Medical History:   Diagnosis Date   • Acute kidney injury (CMS/HCC)    • Altered mental status, unspecified    • Anemia    • Arthritis     bilateral hands   • Aspiration pneumonia (CMS/HCC)    • Atrial fibrillation (CMS/HCC)    • Carotid stenosis, bilateral    • Coronary artery disease    • Dementia    • Depression    • Diabetes mellitus (CMS/HCC)    • Dysphagia    • Encephalopathy acute    • GERD (gastroesophageal reflux disease)    • Hyperlipidemia    • Hypertension    • Hypocalcemia    • Left bundle branch block    • Leukocytosis    • LV dysfunction    • Septic shock (CMS/HCC)    • Stroke (CMS/HCC)    • Syncope    • Ulcerative colitis (CMS/HCC)      Past Surgical History:  Past Surgical History:   Procedure Laterality Date   • CARDIAC SURGERY     • GTUBE INSERTION     • KNEE SURGERY Left        Allergies:  No Known Allergies  Home Meds:  Medications Prior to Admission   Medication Sig Dispense Refill Last Dose   • acetaminophen (TYLENOL) 325 MG tablet Take 650 mg by mouth Every 4 (Four) Hours As Needed for Mild Pain .      • aspirin 81 MG EC tablet Take 81 mg by mouth Daily.      • atorvastatin (LIPITOR) 40 MG tablet Take 40 mg by mouth Every Night.   8/28/2018 at 2100   • bisacodyl (BISACODYL LAXATIVE) 10 MG suppository Insert 10 mg into the rectum Daily As Needed for Constipation.      • Calcium Carb-Cholecalciferol (OYSTER SHELL CALCIUM/VITAMIN D) 250-125 MG-UNIT tablet tablet Take 2 tablets by mouth Daily.   8/28/2018 at 0800   • calcium carbonate (TUMS) 500 MG chewable tablet Chew 1 tablet Every 6 (Six) Hours As Needed for Indigestion or Heartburn.      • cetirizine (zyrTEC) 10 MG tablet Take 10 mg by mouth Daily.      • diltiaZEM (CARDIZEM) 60 MG tablet Take 60 mg by mouth 3 (Three) Times a Day.   8/28/2018 at 2200   • Divalproex Sodium (DEPAKOTE SPRINKLE) 125 MG capsule Take 125 mg by mouth 2 (Two) Times a Day.      • donepezil (ARICEPT) 10 MG tablet Take 10 mg by mouth Every Night.   8/28/2018 at 2100   • guaiFENesin (ROBITUSSIN) 100 MG/5ML syrup Take 200 mg by mouth 4 (Four) Times a Day As Needed for Cough.      • Hypromellose (ARTIFICIAL TEARS OP) Apply 1 drop to eye(s) as directed by provider 3 (Three) Times a Day.      • iron polysaccharides (NIFEREX) 150 MG capsule Take 150 mg by mouth Daily.   8/28/2018 at 2100   • lactulose (CHRONULAC) 10 GM/15ML solution Take 20 g by mouth Daily As Needed.   Unknown at Unknown time   • lisinopril (PRINIVIL,ZESTRIL) 10 MG tablet Take 10 mg by mouth Daily.   8/28/2018 at 2100   • memantine (NAMENDA XR) 28 MG capsule sustained-release 24 hr extended release capsule Take 28 mg by mouth Daily.      • mirtazapine (REMERON) 15 MG tablet Take 15 mg by mouth Every Night.      • montelukast (SINGULAIR) 10 MG tablet Take 10 mg by mouth Every Night.      •  olopatadine (PATADAY) 0.2 % solution ophthalmic solution 1 drop Every 8 (Eight) Hours.      • omeprazole (priLOSEC) 40 MG capsule Take 40 mg by mouth Daily.      • sulfaSALAzine (AZULFIDINE) 500 MG tablet Take 500 mg by mouth 3 (Three) Times a Day.   2018 at 2100   • acetaminophen (TYLENOL) 500 MG tablet Take 500 mg by mouth Every 4 (Four) Hours As Needed for Mild Pain (1-3).   Unknown at Unknown time   • bisacodyl (DULCOLAX) 5 MG EC tablet Insert 5 mg into the rectum Daily As Needed for Constipation.   Unknown at Unknown time   • calcium citrate-vitamin d (CITRACAL) 200-250 MG-UNIT tablet tablet 1 tablet by Per G Tube route Daily.   2017 at 0800   • fluticasone (FLONASE) 50 MCG/ACT nasal spray 1 spray into the nostril(s) as directed by provider Daily. Each nostril   2018 at 0800   • Loratadine (CLARITIN) 10 MG capsule Take 10 mg by mouth Daily.   2018 at 0800   • memantine (NAMENDA XR) 7 MG capsule sustained-release 24 hr extended release capsule Take 28 mg by mouth Daily.   2018 at 0800   • omeprazole (priLOSEC) 20 MG capsule Take 20 mg by mouth Daily.   2018 at 0600     Current Meds:      aspirin EC tablet 81 mg 81 mg, PO, Daily Given: 1122   atorvastatin (LIPITOR) tablet 10 mg 10 mg, PO, Nightly Given: 2030   calcium-vitamin D 500-200 MG-UNIT tablet 1,000 mg 1,000 mg, PO, Daily Given:  112   cefTRIAXone (ROCEPHIN) IVPB 1 g 1 g, IV, Q24H [place]/14 145   diltiaZEM (CARDIZEM) tablet 60 mg 60 mg, PO, TID Given:  153   Divalproex Sodium (DEPAKOTE SPRINKLE) capsule 125 mg 125 mg, PO, Q12H Given:  112   donepezil (ARICEPT) tablet 10 mg 10 mg, PO, Nightly Given: 2030   enoxaparin (LOVENOX) syringe 30 mg 30 mg, SC, Q24H Given:  164   ketotifen (ZADITOR) 0.025 % ophthalmic solution 1 drop 1 drop, Both Eyes, BID Given: 1121   memantine (NAMENDA) tablet 10 mg 10 mg, PO, Q12H Given: 1121   mirtazapine (REMERON) tablet 15 mg 15 mg, PO,  "Nightly Given: 2029   pantoprazole (PROTONIX) EC tablet 40 mg 40 mg, PO, QAM Given: 605   sulfaSALAzine (AZULFIDINE) tablet 500 mg 500 mg, PO, TID Given:  153      Continuous     Medication Dose/Rate, Route, Frequency Last Action   sodium chloride 0.9 % with KCl 20 mEq/L infusion 75 mL/hr, IV, Continuous [place]/14 132      PRN     Medication Dose/Rate, Route, Frequency Last Action   sodium chloride 0.9 % flush 10 mL 10 mL, IV, PRN Ordered          Social History:   Social History     Tobacco Use   • Smoking status: Former Smoker   Substance Use Topics   • Alcohol use: No      Family History:  History reviewed. No pertinent family history.     Review of Systems  Constitutional: No wt loss, fever   Gastrointestinal: No nausea , abdominal pain  Behavioral/Psych: No insomnia or anxiety   Cardiovascular ----positive for palpitation. All other systems reviewed and are negative                     Physical Exam  /81 (BP Location: Left arm, Patient Position: Lying)   Pulse 88   Temp 97.9 °F (36.6 °C) (Oral)   Resp 18   Ht 182 cm (71.65\")   Wt 110 kg (242 lb 8.1 oz)   SpO2 96%   BMI 33.21 kg/m²     General appearance: No acute changes   Eyes: Sclera conjunctiva normal, pupils reactive   HENT: Atraumatic; oropharynx clear with moist mucous membranes and no mucosal ulcerations;  Neck: Trachea midline; NECK, supple, no thyromegaly or lymphadenopathy   Lungs: Normal size and shape, normal breath sounds, equal distribution of air, no rales and rhonchi   CV: S1-S2 regular, no murmurs, no rub, no gallop   Abdomen: Soft, non-tender; no masses , no abnormal abdominal sounds   Extremities: No deformity , normal color , no peripheral edema   Skin: Normal temperature, turgor and texture; no rash, ulcers  Psych: Appropriate affect, alert and oriented to person, place and time                   Cardiographics  ECG: Sinus rhythm, LBBB  19 for comparison Sinus rhythm with PVCs probable " left atrial abnormality      Telemetry:  sr        6 beat run of vt            Echocardiogram:   8/14/19  Interpretation Summary     · Left ventricular systolic function is mildly decreased. Calculated EF = 50.0%. Estimated EF was in disagreement with the calculated EF. Estimated EF appears to be in the range of 41 - 45%. Normal left ventricular cavity size and wall thickness noted.  · The following segments are hypokinetic: basal inferior, basal inferolateral, mid inferior, mid inferolateral, mid anterolateral and apical lateral.  · Left ventricular diastolic dysfunction is noted (grade I) consistent with impaired relaxation.  · The aortic valve is not well visualized. The aortic valve is abnormal in structure. There is moderate calcification of the aortic valve.  · Mild to moderate aortic valve stenosis is present. It may be more severe.  · Mild MAC is present.         Imaging  Chest X-ray:   8/13/19    Study Result     XR CHEST 1 VW-     HISTORY: Male who is 74 years-old,  syncope     TECHNIQUE: Frontal view of the chest     COMPARISON: None available     FINDINGS: Heart size is normal. Aorta is tortuous. Pulmonary vasculature  is unremarkable. Sternotomy wires are noted. Slight thickening of the  minor fissure. Minimal atelectasis in the lower lungs. No focal  pulmonary consolidation, pleural effusion, or pneumothorax. No acute  osseous process.     IMPRESSION:  No focal pulmonary consolidation. Tortuous aorta. Follow-up  as clinically indicated.     This report was finalized on 8/13/2019 1:58 PM by Dr. Sagar Verduzco M.D.        CT of head without contrast  Study Result     CT HEAD WO CONTRAST-     INDICATIONS: Syncope, fall injury     TECHNIQUE: Radiation dose reduction techniques were utilized, including  automated exposure control and exposure modulation based on body size.  Noncontrast head CT     COMPARISON: None available     FINDINGS:           No acute intracranial hemorrhage, midline shift or mass  effect. No acute  territorial infarct is identified.. Areas of encephalomalacia/old  infarct changes in the left frontal and occipital lobes.     Mild periventricular hypodensities suggest chronic small vessel ischemic  change in a patient this age.     Arterial calcifications are seen at the base of the brain.     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  Expected ex vacuo dilatation of the anterior horn of the left lateral  ventricle. Ventricles, cisterns, cerebral sulci are otherwise  unremarkable for patient age.     The visualized paranasal sinuses, orbits, mastoid air cells are  unremarkable.                 IMPRESSION:     No acute intracranial hemorrhage or hydrocephalus. Chronic changes of  the brain. If there is further clinical concern, MRI could be considered  for further evaluation.     This report was finalized on 8/13/2019 2:15 PM by Dr. Sagar Verduzco M.D.            Lab Review   Results from last 7 days   Lab Units 08/13/19  1309   TROPONIN T ng/mL 0.013     Results from last 7 days   Lab Units 08/14/19  0333   MAGNESIUM mg/dL 1.9     Results from last 7 days   Lab Units 08/14/19  0333   SODIUM mmol/L 139   POTASSIUM mmol/L 4.3   BUN mg/dL 25*   CREATININE mg/dL 2.00*   CALCIUM mg/dL 8.4*        Results from last 7 days   Lab Units 08/14/19  0333 08/13/19  1309   WBC 10*3/mm3 7.85 11.48*   HEMOGLOBIN g/dL 12.5* 15.5   HEMATOCRIT % 42.8 50.6   PLATELETS 10*3/mm3 281 314           The following medical decision was discussed in detail with Dr. Rizzo    Assessment:  1. CAF with RVR  2.  Hypertension   3.  Hyperlipidemia   4.  Chronic kidney disease III  5.  Dementia  6. ulcerative colitis       Recommendations / Plan:   Mr. Edson stone is a 74-year-old male, new to our service.  We were consulted for PAF with RVR, currently resolved.  In the history of a nursing home it stated he had unspecified atrial fibrillation.     He had a run of V. tach noted, as above.   Trop. Neg. Chest x-ray port showed no focal pulmonary consolidation.  Tortuous aorta.  CT showed no acute intracranial hemorrhage or hydrocephalus.  Chronic changes of the brain.   EKG showed sinus rhythm with LBBB, no changes from previous EKG and sinus rhythm on telemetry.  Last echo showed EF 50%, see full report as above.  Denies chest pain, palps, shortness of breath, and syncope. Not an candidate for a/c due to fall risk, age and frailty.   At this time he does not want a cardiac work-up.  Currently in sinus rhythm, we will check a mag, EKG, troponin, and BMP in a.m.            Lyndasyrhea Mcgee, APRN  8/14/2019, 4:51 PM    Patient personally interviewed and above subjective findings personally confirmed during a face to face contact with patient today  All findings of physical examination confirmed  All pertinent and performed labs, cardiac procedures ,  radiographs of the last 24 hours personally reviewed  Impression and plans discussed/elaborated and implemented jointly as described above     Amaya Rizzo MD            EMR Dragon/Transcription:   Dictated utilizing Dragon dictation

## 2019-08-14 NOTE — CONSULTS
[facility]                                                                                             Nephrology Initial Consult Note    Patient Identification:  Name: Edson Myers MRN: [id number]  Age: 74 y.o. : 1945  Sex: male  Date:2019    Requesting Physician: As per consult order.  Reason for Consultation: Acute on chronic renal failure  Information from:patient/ family/ chart      History of Present Illness: This is a 74 y.o. year old male with no known history of chronic kidney disease, no prior evaluation by nephrology, no prior urology problems, his most recent creatinine was 1.3 in January, previously it was 1.7-1.8 in 2018 but was normal in 2017.  His creatinine was 2.17 yesterday in the emergency department and is 2.0 today after IV fluids.  Medical history significant for history of ulcerative colitis, hypertension, hyperlipidemia, stage III chronic kidney disease and dementia.  He is a nursing home resident who came to emergency department yesterday with increasing weakness low appetite but no shortness of breath fevers chills nausea vomiting, has chronic diarrhea and came in with a presyncopal episode after taking a shower yesterday.  He was admitted due to worsening kidney function on his of urinary tract infection and change in mental status.  He feels well today, requested to go home denies any acute complaints, receiving IV fluids.  On lisinopril for hypertension      The following medical history and medications personally reviewed by me:    Problem List:   Patient Active Problem List    Diagnosis   • Syncope [R55]       Past Medical History:  Past Medical History:   Diagnosis Date   • Acute kidney injury (CMS/HCC)    • Altered mental status, unspecified    • Anemia    • Arthritis     bilateral hands   • Aspiration pneumonia (CMS/HCC)    • Atrial fibrillation (CMS/HCC)    •  Carotid stenosis, bilateral    • Coronary artery disease    • Dementia    • Depression    • Diabetes mellitus (CMS/HCC)    • Dysphagia    • Encephalopathy acute    • GERD (gastroesophageal reflux disease)    • Hyperlipidemia    • Hypertension    • Hypocalcemia    • Left bundle branch block    • Leukocytosis    • LV dysfunction    • Septic shock (CMS/HCC)    • Stroke (CMS/HCC)    • Syncope    • Ulcerative colitis (CMS/HCC)        Past Surgical History:  Past Surgical History:   Procedure Laterality Date   • CARDIAC SURGERY     • GTUBE INSERTION     • KNEE SURGERY Left 1960        Home Meds:   Medications Prior to Admission   Medication Sig Dispense Refill Last Dose   • acetaminophen (TYLENOL) 325 MG tablet Take 650 mg by mouth Every 4 (Four) Hours As Needed for Mild Pain .      • aspirin 81 MG EC tablet Take 81 mg by mouth Daily.      • atorvastatin (LIPITOR) 40 MG tablet Take 40 mg by mouth Every Night.   8/28/2018 at 2100   • bisacodyl (BISACODYL LAXATIVE) 10 MG suppository Insert 10 mg into the rectum Daily As Needed for Constipation.      • Calcium Carb-Cholecalciferol (OYSTER SHELL CALCIUM/VITAMIN D) 250-125 MG-UNIT tablet tablet Take 2 tablets by mouth Daily.   8/28/2018 at 0800   • calcium carbonate (TUMS) 500 MG chewable tablet Chew 1 tablet Every 6 (Six) Hours As Needed for Indigestion or Heartburn.      • cetirizine (zyrTEC) 10 MG tablet Take 10 mg by mouth Daily.      • diltiaZEM (CARDIZEM) 60 MG tablet Take 60 mg by mouth 3 (Three) Times a Day.   8/28/2018 at 2200   • Divalproex Sodium (DEPAKOTE SPRINKLE) 125 MG capsule Take 125 mg by mouth 2 (Two) Times a Day.      • donepezil (ARICEPT) 10 MG tablet Take 10 mg by mouth Every Night.   8/28/2018 at 2100   • guaiFENesin (ROBITUSSIN) 100 MG/5ML syrup Take 200 mg by mouth 4 (Four) Times a Day As Needed for Cough.      • Hypromellose (ARTIFICIAL TEARS OP) Apply 1 drop to eye(s) as directed by provider 3 (Three) Times a Day.      • iron polysaccharides  (NIFEREX) 150 MG capsule Take 150 mg by mouth Daily.   8/28/2018 at 2100   • lactulose (CHRONULAC) 10 GM/15ML solution Take 20 g by mouth Daily As Needed.   Unknown at Unknown time   • lisinopril (PRINIVIL,ZESTRIL) 10 MG tablet Take 10 mg by mouth Daily.   8/28/2018 at 2100   • memantine (NAMENDA XR) 28 MG capsule sustained-release 24 hr extended release capsule Take 28 mg by mouth Daily.      • mirtazapine (REMERON) 15 MG tablet Take 15 mg by mouth Every Night.      • montelukast (SINGULAIR) 10 MG tablet Take 10 mg by mouth Every Night.      • olopatadine (PATADAY) 0.2 % solution ophthalmic solution 1 drop Every 8 (Eight) Hours.      • omeprazole (priLOSEC) 40 MG capsule Take 40 mg by mouth Daily.      • sulfaSALAzine (AZULFIDINE) 500 MG tablet Take 500 mg by mouth 3 (Three) Times a Day.   8/28/2018 at 2100   • acetaminophen (TYLENOL) 500 MG tablet Take 500 mg by mouth Every 4 (Four) Hours As Needed for Mild Pain (1-3).   Unknown at Unknown time   • bisacodyl (DULCOLAX) 5 MG EC tablet Insert 5 mg into the rectum Daily As Needed for Constipation.   Unknown at Unknown time   • calcium citrate-vitamin d (CITRACAL) 200-250 MG-UNIT tablet tablet 1 tablet by Per G Tube route Daily.   5/24/2017 at 0800   • fluticasone (FLONASE) 50 MCG/ACT nasal spray 1 spray into the nostril(s) as directed by provider Daily. Each nostril   8/28/2018 at 0800   • Loratadine (CLARITIN) 10 MG capsule Take 10 mg by mouth Daily.   8/28/2018 at 0800   • memantine (NAMENDA XR) 7 MG capsule sustained-release 24 hr extended release capsule Take 28 mg by mouth Daily.   8/28/2018 at 0800   • omeprazole (priLOSEC) 20 MG capsule Take 20 mg by mouth Daily.   8/28/2018 at 0600       Current Meds:   Current Facility-Administered Medications   Medication Dose Route Frequency Provider Last Rate Last Dose   • aspirin EC tablet 81 mg  81 mg Oral Daily Sourav Oakes MD   81 mg at 08/14/19 1122   • atorvastatin (LIPITOR) tablet 10 mg  10 mg Oral Nightly Violette  MD Sourav   10 mg at 08/13/19 2030   • calcium-vitamin D 500-200 MG-UNIT tablet 1,000 mg  1,000 mg Oral Daily Sourav Oakes MD   1,000 mg at 08/14/19 1121   • cefTRIAXone (ROCEPHIN) IVPB 1 g  1 g Intravenous Q24H Sourav Oakes  mL/hr at 08/14/19 1457 1 g at 08/14/19 1457   • diltiaZEM (CARDIZEM) tablet 60 mg  60 mg Oral TID Sourav Oakes MD   60 mg at 08/14/19 1532   • Divalproex Sodium (DEPAKOTE SPRINKLE) capsule 125 mg  125 mg Oral Q12H Sourav Oakes MD   125 mg at 08/14/19 1125   • donepezil (ARICEPT) tablet 10 mg  10 mg Oral Nightly Sourav Oakes MD   10 mg at 08/13/19 2030   • ketotifen (ZADITOR) 0.025 % ophthalmic solution 1 drop  1 drop Both Eyes BID Sourav Oakes MD   1 drop at 08/14/19 1121   • memantine (NAMENDA) tablet 10 mg  10 mg Oral Q12H Sourav Oakes MD   10 mg at 08/14/19 1121   • mirtazapine (REMERON) tablet 15 mg  15 mg Oral Nightly Sourav Oakes MD   15 mg at 08/13/19 2029   • pantoprazole (PROTONIX) EC tablet 40 mg  40 mg Oral QAM Sourav Oakes MD   40 mg at 08/14/19 0605   • sodium chloride 0.9 % flush 10 mL  10 mL Intravenous PRN Mehran Robbins PA       • sodium chloride 0.9 % with KCl 20 mEq/L infusion  75 mL/hr Intravenous Continuous Sourav Oakes MD 75 mL/hr at 08/14/19 1322 75 mL/hr at 08/14/19 1322   • sulfaSALAzine (AZULFIDINE) tablet 500 mg  500 mg Oral TID Sourav Oakes MD   500 mg at 08/14/19 1532       Allergies:  No Known Allergies    Social History:   Social History     Socioeconomic History   • Marital status: Single     Spouse name: Not on file   • Number of children: Not on file   • Years of education: Not on file   • Highest education level: Not on file   Tobacco Use   • Smoking status: Former Smoker   Substance and Sexual Activity   • Alcohol use: No   • Drug use: Defer   • Sexual activity: Defer        Family History:  History reviewed. No pertinent family history.     Review of Systems: as per HPI, in addition:    General:      c/ of weakness / fatigue,                  "      No fevers / chills                       no weight loss  HEENT:       no dysphagia / odynophagia  Neck:           normal range of motion, no swelling  Respiratory: no cough / congestion                      No shortness of air                       No wheezing  CV:              No chest pain                       No palpitations  Abdomen/GI: no nausea / vomiting                      No diarrhea / constipation                      No abdominal pain  :             no dysuria / urinary frequency                       No urgency, normal output  Endocrine:   no polyuria / polydipsia,                      No heat or cold intolerance  Skin:           no rashes or skin breakdown   Vascular:   No edema                     No claudication  Psych:        no depression/ anxiety  Neuro:        no focal weakness, no seizures  Musculoskeletal: no joint pain or deformities      Physical Exam:  Vitals:   Temp (24hrs), Av.7 °F (36.5 °C), Min:97.5 °F (36.4 °C), Max:97.9 °F (36.6 °C)    /81 (BP Location: Left arm, Patient Position: Lying)   Pulse 88   Temp 97.9 °F (36.6 °C) (Oral)   Resp 18   Ht 182 cm (71.65\")   Wt 110 kg (242 lb 8.1 oz)   SpO2 96%   BMI 33.21 kg/m²   Intake/Output:     Intake/Output Summary (Last 24 hours) at 2019 1607  Last data filed at 2019 1322  Gross per 24 hour   Intake 1000 ml   Output --   Net 1000 ml        Wt Readings from Last 1 Encounters:   19 1149 110 kg (242 lb 8.1 oz)   19 1546 110 kg (242 lb 8.1 oz)   19 1248 110 kg (243 lb)       Exam:    General Appearance:  Awake, alert, oriented x3, no acute distress  Chronically ill-appearing   Head and Face:  Normocephalic, atraumatic, mucus membranes moist, oropharynx clear   Eyes:  No icterus, pupils equal round and reactive to light, extraocular movements intact    ENMT: Moist mucosa, tongue symmetric    Neck: Supple  no jugular venous distention  no thyromegaly   Pulmonary:  Respiratory effort: " Normal  Auscultation of lungs: Clear bilaterally  No wheezes  No rhonchi  Good air movement, good expansion   Chest wall:  No tenderness or deformity   Cardiovascular:  Auscultation of the heart: Normal rhythm, no murmurs  No edema of extremities    Abdomen:  Abdomen: soft, non-tender, normal bowel sounds all four quadrants, no masses   Liver and spleen: no hepatosplenomegaly   Musculoskeletal: Digits and nails: normal  Normal range of motion  No joint swelling or gross deformities    Skin: Skin inspection: color normal, no visible rashes or lesions  Skin palpation: texture, turgor normal, no palpable lesions   Lymphatic:  no cervical lymphadenopathy    Psychiatric: Judgement and insight: normal  Orientation to person place and time: normal  Mood and affect: normal       DATA:  Radiology and Labs:  The following labs independently reviewed by me  Old records independently reviewed showing baseline creatinine is described above, stage III chronic kidney disease  The following radiologic studies independently viewed by me, findings renal ultrasound shows signs of renal atrophy consistent with chronic kidney disease but no solid masses hydronephrosis or stones  Interval notes, chart personally reviewed by me.   New problems include acute on chronic renal failure, stage III chronic kidney disease  Discussed with patient at bedside    Risk/ complexity of medical care/ medical decision making  High risk with new onset renal failure, need for IV antibiotics      Labs:   Recent Results (from the past 24 hour(s))   Comprehensive Metabolic Panel    Collection Time: 08/14/19  3:33 AM   Result Value Ref Range    Glucose 81 65 - 99 mg/dL    BUN 25 (H) 8 - 23 mg/dL    Creatinine 2.00 (H) 0.76 - 1.27 mg/dL    Sodium 139 136 - 145 mmol/L    Potassium 4.3 3.5 - 5.2 mmol/L    Chloride 107 98 - 107 mmol/L    CO2 17.6 (L) 22.0 - 29.0 mmol/L    Calcium 8.4 (L) 8.6 - 10.5 mg/dL    Total Protein 6.3 6.0 - 8.5 g/dL    Albumin 3.40 (L) 3.50  - 5.20 g/dL    ALT (SGPT) 14 1 - 41 U/L    AST (SGOT) 21 1 - 40 U/L    Alkaline Phosphatase 55 39 - 117 U/L    Total Bilirubin 0.2 0.2 - 1.2 mg/dL    eGFR Non African Amer 33 (L) >60 mL/min/1.73    Globulin 2.9 gm/dL    A/G Ratio 1.2 g/dL    BUN/Creatinine Ratio 12.5 7.0 - 25.0    Anion Gap 14.4 5.0 - 15.0 mmol/L   BNP    Collection Time: 08/14/19  3:33 AM   Result Value Ref Range    proBNP 210.8 5.0-1,800.0 pg/mL   TSH    Collection Time: 08/14/19  3:33 AM   Result Value Ref Range    TSH 0.732 0.270 - 4.200 mIU/mL   Lipid Panel    Collection Time: 08/14/19  3:33 AM   Result Value Ref Range    Total Cholesterol 127 0 - 200 mg/dL    Triglycerides 152 (H) 0 - 150 mg/dL    HDL Cholesterol 29 (L) 40 - 60 mg/dL    LDL Cholesterol  68 0 - 100 mg/dL    VLDL Cholesterol 30.4 5 - 40 mg/dL    LDL/HDL Ratio 2.33    Hemoglobin A1c    Collection Time: 08/14/19  3:33 AM   Result Value Ref Range    Hemoglobin A1C 5.80 (H) 4.80 - 5.60 %   CBC Auto Differential    Collection Time: 08/14/19  3:33 AM   Result Value Ref Range    WBC 7.85 3.40 - 10.80 10*3/mm3    RBC 4.67 4.14 - 5.80 10*6/mm3    Hemoglobin 12.5 (L) 13.0 - 17.7 g/dL    Hematocrit 42.8 37.5 - 51.0 %    MCV 91.6 79.0 - 97.0 fL    MCH 26.8 26.6 - 33.0 pg    MCHC 29.2 (L) 31.5 - 35.7 g/dL    RDW 17.7 (H) 12.3 - 15.4 %    RDW-SD 58.9 (H) 37.0 - 54.0 fl    MPV 10.3 6.0 - 12.0 fL    Platelets 281 140 - 450 10*3/mm3    Neutrophil % 64.8 42.7 - 76.0 %    Lymphocyte % 23.7 19.6 - 45.3 %    Monocyte % 9.8 5.0 - 12.0 %    Eosinophil % 0.8 0.3 - 6.2 %    Basophil % 0.5 0.0 - 1.5 %    Immature Grans % 0.4 0.0 - 0.5 %    Neutrophils, Absolute 5.09 1.70 - 7.00 10*3/mm3    Lymphocytes, Absolute 1.86 0.70 - 3.10 10*3/mm3    Monocytes, Absolute 0.77 0.10 - 0.90 10*3/mm3    Eosinophils, Absolute 0.06 0.00 - 0.40 10*3/mm3    Basophils, Absolute 0.04 0.00 - 0.20 10*3/mm3    Immature Grans, Absolute 0.03 0.00 - 0.05 10*3/mm3    nRBC 0.0 0.0 - 0.2 /100 WBC   Magnesium    Collection Time:  08/14/19  3:33 AM   Result Value Ref Range    Magnesium 1.9 1.6 - 2.4 mg/dL   Adult Transthoracic Echo Complete W/ Cont if Necessary Per Protocol    Collection Time: 08/14/19 11:49 AM   Result Value Ref Range    BSA 2.3 m^2    IVSd 1.1 cm    LVIDd 3.7 cm    LVPWd 0.8 cm    IVS/LVPW 1.4     EDV(Teich) 58.1 ml    EDV(cubed) 50.7 ml    LV mass(C)d 104.6 grams    LV mass(C)dI 45.4 grams/m^2    Ao root diam 3.7 cm    Ao root area 10.8 cm^2    asc Aorta Diam 3.3 cm    LVOT diam 2.0 cm    LVOT area 3.1 cm^2    LVOT area(traced) 3.1 cm^2    RVOT diam 2.7 cm    RVOT area 5.7 cm^2    LVLd ap4 9.1 cm    EDV(MOD-sp4) 137.0 ml    LVLs ap4 8.1 cm    ESV(MOD-sp4) 68.0 ml    EF(MOD-sp4) 50.4 %    LVLd ap2 8.3 cm    EDV(MOD-sp2) 67.0 ml    LVLs ap2 7.5 cm    ESV(MOD-sp2) 48.0 ml    EF(MOD-sp2) 28.4 %    SV(MOD-sp4) 69.0 ml    SI(MOD-sp4) 29.9 ml/m^2    SV(MOD-sp2) 19.0 ml    SI(MOD-sp2) 8.2 ml/m^2    Ao root area (BSA corrected) 1.6     LV Menjivar Vol (BSA corrected) 59.5 ml/m^2    LV Sys Vol (BSA corrected) 29.5 ml/m^2    MV A dur 169.0 sec    MV E max stephon 41.5 cm/sec    MV A max stephon 95.8 cm/sec    MV E/A 0.43     MV V2 mean 57.7 cm/sec    MV mean PG 2.0 mmHg    MV V2 VTI 26.4 cm    MVA(VTI) 2.5 cm^2    MV P1/2t max stephon 70.9 cm/sec    MV P1/2t 48.7 msec    MVA(P1/2t) 4.5 cm^2    MV dec slope 426.0 cm/sec^2    MV dec time 190 sec    Ao V2 mean 149.0 cm/sec    Ao mean PG 10.0 mmHg    Ao mean PG (full) 7.0 mmHg    Ao V2 VTI 44.4 cm    CHANDRA(I,A) 1.5 cm^2    CHANDRA(I,D) 1.5 cm^2    LV V1 mean PG 3.0 mmHg    LV V1 mean 78.1 cm/sec    LV V1 VTI 21.1 cm    SV(Ao) 477.4 ml    SI(Ao) 207.2 ml/m^2    SV(LVOT) 66.3 ml    SV(RVOT) 77.3 ml    SI(LVOT) 28.8 ml/m^2    PA V2 max 122.0 cm/sec    PA max PG 6.0 mmHg    PA max PG (full) 3.5 mmHg    BH CV ECHO DANII - PVA(V,A) 3.7 cm^2    BH CV ECHO DANII - PVA(V,D) 3.7 cm^2    PA acc slope 1,195 cm/sec^2    PA acc time 0.07 sec    RV V1 max PG 2.4 mmHg    RV V1 mean PG 1.0 mmHg    RV V1 max 78.2 cm/sec    RV V1  mean 47.0 cm/sec    RV V1 VTI 13.5 cm    PA pr(Accel) 48.9 mmHg    Pulm Sys Bharathi 22.3 cm/sec    Pulm Menjivar Bharathi 31.6 cm/sec    Pulm S/D 0.71     Qp/Qs 1.2     Pulm A Revs Dur 0.1 sec    Pulm A Revs Bharathi 22.8 cm/sec    MVA P1/2T LCG 3.1 cm^2     CV ECHO DANII - BZI_BMI 33.2 kilograms/m^2     CV ECHO DANII - BSA(HAYCOCK) 2.4 m^2     CV ECHO DANII - BZI_METRIC_WEIGHT 110.0 kg     CV ECHO DANII - BZI_METRIC_HEIGHT 182.0 cm    Target HR (85%) 123 bpm    Max. Pred. HR (100%) 145 bpm    TDI S' 11.00 cm/sec    RV Base 2.90 cm    Dimensionless Index 0.5 (DI)    LA Volume Index 31.0 mL/m2    Avg E/e' ratio 5.25     Ao pk bharathi 211.0 cm/sec    EF(MOD-bp) 50.0 %    Lat Peak E' Bharathi 9.5 cm/sec    LV V1 max PG 5.9 mmHg    LV V1 max 121.0 cm/sec    Med Peak E' Bharathi 6.30 cm/sec    Ao max PG 17.9 mmHg    TAPSE (>1.6) 1.70 cm2       Radiology:  Imaging Results (last 24 hours)     Procedure Component Value Units Date/Time    US Renal Bilateral [[id number]] Collected:  08/13/19 2226     Updated:  08/13/19 2226    Narrative:       BILATERAL RENAL ULTRASOUND     CLINICAL HISTORY: KELLY; R55-Syncope and collapse; N39.0-Urinary tract  infection, site not specified; N17.9-Acute kidney failure, unspecified     FINDINGS: Longitudinal and transverse images of the kidneys were  obtained. The right kidney measures 10.2 cm in length. The left kidney  measures 11.5 cm in length. There is cortical renal atrophy bilaterally.  There are 2 cystic-appearing right renal lesions. The larger measures 3  cm. The smaller 1.7 cm. No solid or suspicious mass. No hydronephrosis.  Bladder poorly distended and not well seen.          Impression:        Bilateral atrophy with 2 right-sided cystic lesions.                     ASSESSMENT:   New acute renal failure, likely prerenal, ACE inhibitor contributing.  No obstruction on ultrasound, check additional urine electrolytes  Stage III chronic kidney disease  Syncope/presyncope  Chronic hypertension  Dementia  Mild  dehydration  History of ulcerative colitis with chronic diarrhea  Acute, new urinary tract infection      PLAN:   Agree with continued IV fluids  No sign of obstruction on renal ultrasound, check additional urine studies  Hold ACE inhibitor  Antibiotics IV, renally dosed  Continue current supportive care, Continue to monitor electrolytes and volume closely, avoid IV contrast and nephrotoxic medications   Medications on hold: Lisinopril      I appreciate the opportunity to participate in this patient's care.  Please call with any questions or concerns.   Patient follow-up: To be determined      Mayo Pennington M.D  [facility]  Office phone number: [phone]  Answering service phone number: [phone]        8/14/2019        Dictation via Dragon dictation software

## 2019-08-15 LAB
ANION GAP SERPL CALCULATED.3IONS-SCNC: 12.5 MMOL/L (ref 5–15)
BACTERIA SPEC AEROBE CULT: NORMAL
BASOPHILS # BLD AUTO: 0.02 10*3/MM3 (ref 0–0.2)
BASOPHILS NFR BLD AUTO: 0.3 % (ref 0–1.5)
BUN BLD-MCNC: 14 MG/DL (ref 8–23)
BUN/CREAT SERPL: 10.9 (ref 7–25)
CALCIUM SPEC-SCNC: 8.4 MG/DL (ref 8.6–10.5)
CHLORIDE SERPL-SCNC: 111 MMOL/L (ref 98–107)
CO2 SERPL-SCNC: 19.5 MMOL/L (ref 22–29)
CREAT BLD-MCNC: 1.28 MG/DL (ref 0.76–1.27)
DEPRECATED RDW RBC AUTO: 59.1 FL (ref 37–54)
EOSINOPHIL # BLD AUTO: 0.15 10*3/MM3 (ref 0–0.4)
EOSINOPHIL NFR BLD AUTO: 2.4 % (ref 0.3–6.2)
ERYTHROCYTE [DISTWIDTH] IN BLOOD BY AUTOMATED COUNT: 17.6 % (ref 12.3–15.4)
GFR SERPL CREATININE-BSD FRML MDRD: 55 ML/MIN/1.73
GLUCOSE BLD-MCNC: 71 MG/DL (ref 65–99)
HCT VFR BLD AUTO: 42.3 % (ref 37.5–51)
HGB BLD-MCNC: 12.3 G/DL (ref 13–17.7)
IMM GRANULOCYTES # BLD AUTO: 0.02 10*3/MM3 (ref 0–0.05)
IMM GRANULOCYTES NFR BLD AUTO: 0.3 % (ref 0–0.5)
LYMPHOCYTES # BLD AUTO: 2.41 10*3/MM3 (ref 0.7–3.1)
LYMPHOCYTES NFR BLD AUTO: 39.1 % (ref 19.6–45.3)
MAGNESIUM SERPL-MCNC: 1.9 MG/DL (ref 1.6–2.4)
MCH RBC QN AUTO: 26.7 PG (ref 26.6–33)
MCHC RBC AUTO-ENTMCNC: 29.1 G/DL (ref 31.5–35.7)
MCV RBC AUTO: 91.8 FL (ref 79–97)
MONOCYTES # BLD AUTO: 0.67 10*3/MM3 (ref 0.1–0.9)
MONOCYTES NFR BLD AUTO: 10.9 % (ref 5–12)
NEUTROPHILS # BLD AUTO: 2.89 10*3/MM3 (ref 1.7–7)
NEUTROPHILS NFR BLD AUTO: 47 % (ref 42.7–76)
NRBC BLD AUTO-RTO: 0 /100 WBC (ref 0–0.2)
PLATELET # BLD AUTO: 223 10*3/MM3 (ref 140–450)
PMV BLD AUTO: 10.2 FL (ref 6–12)
POTASSIUM BLD-SCNC: 4.4 MMOL/L (ref 3.5–5.2)
RBC # BLD AUTO: 4.61 10*6/MM3 (ref 4.14–5.8)
SODIUM BLD-SCNC: 143 MMOL/L (ref 136–145)
TROPONIN T SERPL-MCNC: <0.01 NG/ML (ref 0–0.03)
WBC NRBC COR # BLD: 6.16 10*3/MM3 (ref 3.4–10.8)

## 2019-08-15 PROCEDURE — 25010000002 ENOXAPARIN PER 10 MG: Performed by: HOSPITALIST

## 2019-08-15 PROCEDURE — 25810000003 SODIUM CHLORIDE 0.9 % WITH KCL 20 MEQ 20-0.9 MEQ/L-% SOLUTION: Performed by: HOSPITALIST

## 2019-08-15 PROCEDURE — 97166 OT EVAL MOD COMPLEX 45 MIN: CPT

## 2019-08-15 PROCEDURE — 93005 ELECTROCARDIOGRAM TRACING: CPT | Performed by: NURSE PRACTITIONER

## 2019-08-15 PROCEDURE — 85025 COMPLETE CBC W/AUTO DIFF WBC: CPT | Performed by: HOSPITALIST

## 2019-08-15 PROCEDURE — 94799 UNLISTED PULMONARY SVC/PX: CPT

## 2019-08-15 PROCEDURE — 93010 ELECTROCARDIOGRAM REPORT: CPT | Performed by: INTERNAL MEDICINE

## 2019-08-15 PROCEDURE — 84484 ASSAY OF TROPONIN QUANT: CPT | Performed by: NURSE PRACTITIONER

## 2019-08-15 PROCEDURE — 97162 PT EVAL MOD COMPLEX 30 MIN: CPT

## 2019-08-15 PROCEDURE — 99231 SBSQ HOSP IP/OBS SF/LOW 25: CPT | Performed by: INTERNAL MEDICINE

## 2019-08-15 PROCEDURE — 25010000002 CEFTRIAXONE PER 250 MG: Performed by: HOSPITALIST

## 2019-08-15 PROCEDURE — G0378 HOSPITAL OBSERVATION PER HR: HCPCS

## 2019-08-15 PROCEDURE — 97165 OT EVAL LOW COMPLEX 30 MIN: CPT

## 2019-08-15 PROCEDURE — 83735 ASSAY OF MAGNESIUM: CPT | Performed by: NURSE PRACTITIONER

## 2019-08-15 PROCEDURE — 94640 AIRWAY INHALATION TREATMENT: CPT

## 2019-08-15 PROCEDURE — 80048 BASIC METABOLIC PNL TOTAL CA: CPT | Performed by: HOSPITALIST

## 2019-08-15 RX ORDER — IPRATROPIUM BROMIDE AND ALBUTEROL SULFATE 2.5; .5 MG/3ML; MG/3ML
3 SOLUTION RESPIRATORY (INHALATION) EVERY 4 HOURS PRN
Status: DISCONTINUED | OUTPATIENT
Start: 2019-08-15 | End: 2019-08-16 | Stop reason: HOSPADM

## 2019-08-15 RX ADMIN — SODIUM CHLORIDE, PRESERVATIVE FREE 10 ML: 5 INJECTION INTRAVENOUS at 09:29

## 2019-08-15 RX ADMIN — SULFASALAZINE 500 MG: 500 TABLET ORAL at 09:29

## 2019-08-15 RX ADMIN — KETOTIFEN FUMARATE 1 DROP: 0.35 SOLUTION/ DROPS OPHTHALMIC at 21:14

## 2019-08-15 RX ADMIN — MEMANTINE HYDROCHLORIDE 10 MG: 10 TABLET, FILM COATED ORAL at 21:14

## 2019-08-15 RX ADMIN — DIVALPROEX SODIUM 125 MG: 125 CAPSULE, COATED PELLETS ORAL at 09:29

## 2019-08-15 RX ADMIN — POTASSIUM CHLORIDE AND SODIUM CHLORIDE 75 ML/HR: 900; 150 INJECTION, SOLUTION INTRAVENOUS at 02:18

## 2019-08-15 RX ADMIN — IPRATROPIUM BROMIDE AND ALBUTEROL SULFATE 3 ML: 2.5; .5 SOLUTION RESPIRATORY (INHALATION) at 21:48

## 2019-08-15 RX ADMIN — SULFASALAZINE 500 MG: 500 TABLET ORAL at 21:14

## 2019-08-15 RX ADMIN — MEMANTINE HYDROCHLORIDE 10 MG: 10 TABLET, FILM COATED ORAL at 09:29

## 2019-08-15 RX ADMIN — CALCIUM CARBONATE-VITAMIN D TAB 500 MG-200 UNIT 1000 MG: 500-200 TAB at 09:29

## 2019-08-15 RX ADMIN — KETOTIFEN FUMARATE 1 DROP: 0.35 SOLUTION/ DROPS OPHTHALMIC at 09:30

## 2019-08-15 RX ADMIN — SODIUM CHLORIDE, PRESERVATIVE FREE 10 ML: 5 INJECTION INTRAVENOUS at 21:14

## 2019-08-15 RX ADMIN — PANTOPRAZOLE SODIUM 40 MG: 40 TABLET, DELAYED RELEASE ORAL at 06:35

## 2019-08-15 RX ADMIN — DIVALPROEX SODIUM 125 MG: 125 CAPSULE, COATED PELLETS ORAL at 21:14

## 2019-08-15 RX ADMIN — METOPROLOL TARTRATE 25 MG: 25 TABLET ORAL at 21:13

## 2019-08-15 RX ADMIN — METOPROLOL TARTRATE 25 MG: 25 TABLET ORAL at 12:29

## 2019-08-15 RX ADMIN — DONEPEZIL HYDROCHLORIDE 10 MG: 10 TABLET, FILM COATED ORAL at 21:14

## 2019-08-15 RX ADMIN — DILTIAZEM HYDROCHLORIDE 60 MG: 60 TABLET, FILM COATED ORAL at 09:29

## 2019-08-15 RX ADMIN — ASPIRIN 81 MG: 81 TABLET, COATED ORAL at 09:29

## 2019-08-15 RX ADMIN — MIRTAZAPINE 15 MG: 15 TABLET, FILM COATED ORAL at 21:14

## 2019-08-15 RX ADMIN — ATORVASTATIN CALCIUM 10 MG: 10 TABLET, FILM COATED ORAL at 21:14

## 2019-08-15 RX ADMIN — ENOXAPARIN SODIUM 30 MG: 30 INJECTION SUBCUTANEOUS at 17:19

## 2019-08-15 RX ADMIN — SULFASALAZINE 500 MG: 500 TABLET ORAL at 15:26

## 2019-08-15 RX ADMIN — CEFTRIAXONE SODIUM 1 G: 1 INJECTION, SOLUTION INTRAVENOUS at 15:25

## 2019-08-15 NOTE — DISCHARGE PLACEMENT REQUEST
"Edson Em (74 y.o. Male)     Date of Birth Social Security Number Address Home Phone MRN    1945  [address] [facility] [address] [facility] [address] [phone] [id number]    [employer] Marital Status          None Single       Admission Date Admission Type Admitting Provider Attending Provider Department, Room/Bed    8/13/19 Emergency Sourav Oakes MD Ahmed, Aftab, MD [address], [address]    Discharge Date Discharge Disposition Discharge Destination                       Attending Provider:  Sourav Oakes MD    Allergies:  No Known Allergies    Isolation:  None   Infection:  None   Code Status:  No CPR    Ht:  182 cm (71.65\")   Wt:  110 kg (242 lb 8.1 oz)    Admission Cmt:  None   Principal Problem:  None                Active Insurance as of 8/13/2019     Primary Coverage     Payor Plan Insurance Group Employer/Plan Group    MEDICARE MEDICARE A ONLY      Payor Plan Address Payor Plan Phone Number Payor Plan Fax Number Effective Dates    [address] [phone]  5/1/2007 - None Entered    [facility] [id number]       Subscriber Name Subscriber Birth Date Member ID       EDSON EM 1945 [id number]           Secondary Coverage     Payor Plan Insurance Group Employer/Plan Group    [place]      Payor Plan Address Payor Plan Phone Number Payor Plan Fax Number Effective Dates    [address] [phone]  3/26/2017 - None Entered    [place] [address]       Subscriber Name Subscriber Birth Date Member ID       EDSON EM 1945 [id number]                 Emergency Contacts      (Rel.) Home Phone Work Phone Mobile Phone    Sanjiv Em (Son) [phone] -- --              "

## 2019-08-15 NOTE — THERAPY EVALUATION
Acute Care - Occupational Therapy Initial Evaluation  [facility]     Patient Name: Edson Myers  : 1945  MRN: [id number]  Today's Date: 8/15/2019             Admit Date: 2019       ICD-10-CM ICD-9-CM   1. Syncope, unspecified syncope type R55 780.2   2. Acute UTI N39.0 599.0   3. KELLY (acute kidney injury) (CMS/HCC) N17.9 584.9     Patient Active Problem List   Diagnosis   • Syncope   • Acute UTI (urinary tract infection)     Past Medical History:   Diagnosis Date   • Acute kidney injury (CMS/HCC)    • Altered mental status, unspecified    • Anemia    • Arthritis     bilateral hands   • Aspiration pneumonia (CMS/HCC)    • Atrial fibrillation (CMS/HCC)    • Carotid stenosis, bilateral    • Coronary artery disease    • Dementia    • Depression    • Diabetes mellitus (CMS/HCC)    • Dysphagia    • Encephalopathy acute    • GERD (gastroesophageal reflux disease)    • Hyperlipidemia    • Hypertension    • Hypocalcemia    • Left bundle branch block    • Leukocytosis    • LV dysfunction    • Septic shock (CMS/HCC)    • Stroke (CMS/HCC)    • Syncope    • Ulcerative colitis (CMS/HCC)      Past Surgical History:   Procedure Laterality Date   • CARDIAC SURGERY     • GTUBE INSERTION     • KNEE SURGERY Left           OT ASSESSMENT FLOWSHEET (last 12 hours)      Occupational Therapy Evaluation     Row Name 08/15/19 1600                   OT Evaluation Time/Intention    Subjective Information  no complaints  -MR        Document Type  evaluation  -MR        Mode of Treatment  occupational therapy  -MR        Patient Effort  good  -MR           General Information    Patient Profile Reviewed?  yes  -MR        Patient Observations  alert;cooperative;agree to therapy  -MR        Prior Level of Function  independent:;ADL's  -MR        Equipment Currently Used at Home  walker, rolling;wheelchair  -MR        Existing Precautions/Restrictions  fall  -MR        Benefits Reviewed  patient:  -MR            Relationship/Environment    Lives With  alone  -MR           Resource/Environmental Concerns    Current Living Arrangements  other (see comments) [facility]  -MR           Cognitive Assessment/Intervention- PT/OT    Orientation Status (Cognition)  oriented to;person;place  -MR        Follows Commands (Cognition)  follows one step commands  -MR        Safety Deficit (Cognitive)  awareness of need for assistance  -MR           Bed Mobility Assessment/Treatment    Bed Mobility Assessment/Treatment  sit-supine  -MR        Sit-Supine [place] (Bed Mobility)  supervision  -MR           Functional Mobility    Functional Mobility- Ind. Level  contact guard assist  -MR        Functional Mobility- Device  rolling walker  -MR           Transfer Assessment/Treatment    Transfer Assessment/Treatment  sit-stand transfer;stand-sit transfer  -MR           Sit-Stand Transfer    Sit-Stand [place] (Transfers)  contact guard  -MR        Assistive Device (Sit-Stand Transfers)  walker, front-wheeled  -MR           Stand-Sit Transfer    Stand-Sit [place] (Transfers)  contact guard  -MR        Assistive Device (Stand-Sit Transfers)  walker, front-wheeled  -MR           General ROM    GENERAL ROM COMMENTS  BUE AROM WFL  -MR           MMT (Manual Muscle Testing)    General MMT Comments  BUE > or = 3+/5  -MR           Motor Assessment/Interventions    Additional Documentation  -- UE coordination intact for ADLs  -MR           Positioning and Restraints    Pre-Treatment Position  other (comment) finishing PT eval  -MR        Post Treatment Position  bed  -MR        In Bed  supine;notified nsg;call light within reach;encouraged to call for assist;exit alarm on  -MR           Pain Scale: Numbers Pre/Post-Treatment    Pain Scale: Numbers, Pretreatment  0/10 - no pain  -MR        Pain Scale: Numbers, Post-Treatment  0/10 - no pain  -MR           Clinical Impression (OT)    Criteria for Skilled Therapeutic Interventions Met  (OT Eval)  yes;treatment indicated  -MR        Rehab Potential (OT Eval)  good, to achieve stated therapy goals  -MR        Therapy Frequency (OT Eval)  5 times/wk  -MR        Anticipated Discharge Disposition (OT)  home with assist  -MR           OT Goals    Transfer Goal Selection (OT)  transfer, OT goal 1  -MR        Dressing Goal Selection (OT)  dressing, OT goal 1  -MR        Functional Mobility Goal Selection (OT)  functional mobility, OT goal 1  -MR        Additional Documentation  Functional Mobility Selection (OT) (Row)  -MR           Transfer Goal 1 (OT)    Activity/Assistive Device (Transfer Goal 1, OT)  toilet;bed-to-chair/chair-to-bed;walker, rolling  -MR        [place] Level/Cues Needed (Transfer Goal 1, OT)  supervision required;conditional independence  -MR        Time Frame (Transfer Goal 1, OT)  long term goal (LTG);by discharge  -MR           Dressing Goal 1 (OT)    Activity/Assistive Device (Dressing Goal 1, OT)  dressing skills, all  -MR        [place]/Cues Needed (Dressing Goal 1, OT)  supervision required;conditional independence  -MR        Time Frame (Dressing Goal 1, OT)  long term goal (LTG);by discharge  -MR           Functional Mobility Goal 1 (OT)    Activity/Assistive Device (Functional Mobility Goal 1, OT)  walker, rolling  -MR        [place] Level/Cues Needed (Functional Mobility Goal 1, OT)  supervision required;conditional independence  -MR        Distance Goal 1 (Functional Mobility, OT)  pt to perform functional mobility to bathroom, to the sink  -MR        Time Frame (Functional Mobility Goal 1, OT)  long term goal (LTG);by discharge  -MR          User Key  (r) = Recorded By, (t) = Taken By, (c) = Cosigned By    Initials Name Effective Dates    Nalini Carmona, OT 06/22/16 -          Occupational Therapy Education     Title: PT OT SLP Therapies (Done)     Topic: Occupational Therapy (Done)     Point: ADL training (Done)     Description: Instruct learner(s)  on proper safety adaptation and remediation techniques during self care or transfers.   Instruct in proper use of assistive devices.    Learning Progress Summary           Patient Acceptance, E,TB, VU by MR at 8/15/2019  4:14 PM    Comment:  Educated on role of OT; OT POC.                               User Key     Initials Effective Dates Name Provider Type Discipline    MR 06/22/16 -  Nalini Reyez, OT Occupational Therapist OT                  OT Recommendation and Plan  Outcome Summary/Treatment Plan (OT)  Anticipated Discharge Disposition (OT): home with assist  Therapy Frequency (OT Eval): 5 times/wk  Plan of Care Review  Plan of Care Reviewed With: patient  Plan of Care Reviewed With: patient  Outcome Summary: Pt seen for initial evaluation, recommend skilled OT services to increase safety and independence with performance of ADLs.    Outcome Measures     Row Name 08/15/19 1600             How much help from another is currently needed...    Putting on and taking off regular lower body clothing?  3  -MR      Bathing (including washing, rinsing, and drying)  3  -MR      Toileting (which includes using toilet bed pan or urinal)  3  -MR      Putting on and taking off regular upper body clothing  3  -MR      Taking care of personal grooming (such as brushing teeth)  3  -MR      Eating meals  4  -MR      AM-PAC 6 Clicks Score (OT)  19  -MR         Functional Assessment    Outcome Measure Options  AM-PAC 6 Clicks Daily Activity (OT)  -MR        User Key  (r) = Recorded By, (t) = Taken By, (c) = Cosigned By    Initials Name Provider Type    Nalini Carmona, OT Occupational Therapist          Time Calculation:   Time Calculation- OT     Row Name 08/15/19 1616             Time Calculation- OT    OT Start Time  1405  -MR      OT Stop Time  1415  -MR      OT Time Calculation (min)  10 min  -MR      OT Received On  08/15/19  -MR      OT Goal Re-Cert Due Date  08/22/19  -MR        User Key  (r) = Recorded By,  (t) = Taken By, (c) = Cosigned By    Initials Name Provider Type     Nalini Reyez, OT Occupational Therapist        Therapy Charges for Today     Code Description Service Date Service Provider Modifiers Qty    32267671786 HC OT EVAL MOD COMPLEXITY 2 8/15/2019 Nalini Reyez, ROMÁN GO 1               Nalini Reyez, ROMÁN  8/15/2019

## 2019-08-15 NOTE — PROGRESS NOTES
"Daily progress note    Chief complaint  Doing better  No specific complaint   Still eating poorly    History of present illness  74-year-old white male with history of dementia ulcerative colitis hypertension hyperlipidemia and chronic kidney disease stage III who is a nursing home resident brought to the emergency room with generalized weakness for last few days with no appetite.  Patient denies any chest pain shortness of breath abdominal pain nausea vomiting but he does have chronic diarrhea and he almost passed out while taking shower.  Patient evaluated in ER found to be in acute kidney injury on top of chronic kidney disease also found to have UTI admit for management.  Patient fully alert oriented no specific complaint except does not want to eat and is still feeling very weak.  No other complaints.     REVIEW OF SYSTEMS  Unremarkable except generalized weakness    PHYSICAL EXAM  Blood pressure 151/78, pulse 72, temperature 98 °F (36.7 °C), temperature source Oral, resp. rate 18, height 182 cm (71.65\"), weight 110 kg (242 lb 8.1 oz), SpO2 97 %.    Constitutional: He is oriented to person, place, and time. No distress.   Head: Normocephalic and atraumatic.   Eyes: EOM are normal. Pupils are equal, round, and reactive to light.   Neck: Normal range of motion. Neck supple.   Cardiovascular: Normal rate, regular rhythm and normal heart sounds.   Pulmonary/Chest: Effort normal and breath sounds normal. No respiratory distress.   Abdominal: Soft. There is no tenderness. There is no rebound and no guarding.   Musculoskeletal: Normal range of motion. He exhibits no edema.   Neurological: He is alert and oriented to person, place, and time. He has normal sensation and normal strength.   Skin: Skin is warm and dry.   Psychiatric: Mood and affect normal.      LAB RESULTS  Lab Results (last 24 hours)     Procedure Component Value Units Date/Time    Troponin [[id number]]  (Normal) Collected:  08/15/19 0312    Specimen:  " Blood Updated:  08/15/19 0441     Troponin T <0.010 ng/mL     Narrative:       Troponin T Reference Range:  <= 0.03 ng/mL-   Negative for AMI  >0.03 ng/mL-     Abnormal for myocardial necrosis.  Clinicians would have to utilize clinical acumen, EKG, Troponin and serial changes to determine if it is an Acute Myocardial Infarction or myocardial injury due to an underlying chronic condition.     Basic Metabolic Panel [[id number]]  (Abnormal) Collected:  08/15/19 0312    Specimen:  Blood Updated:  08/15/19 0440     Glucose 71 mg/dL      BUN 14 mg/dL      Creatinine 1.28 mg/dL      Sodium 143 mmol/L      Potassium 4.4 mmol/L      Chloride 111 mmol/L      CO2 19.5 mmol/L      Calcium 8.4 mg/dL      eGFR Non African Amer 55 mL/min/1.73      BUN/Creatinine Ratio 10.9     Anion Gap 12.5 mmol/L     Narrative:       GFR Normal >60  Chronic Kidney Disease <60  Kidney Failure <15    Magnesium [[id number]]  (Normal) Collected:  08/15/19 0312    Specimen:  Blood Updated:  08/15/19 0440     Magnesium 1.9 mg/dL     CBC & Differential [[id number]] Collected:  08/15/19 0312    Specimen:  Blood Updated:  08/15/19 0419    Narrative:       The following orders were created for panel order CBC & Differential.  Procedure                               Abnormality         Status                     ---------                               -----------         ------                     CBC Auto Differential[[id number]]        Abnormal            Final result                 Please view results for these tests on the individual orders.    CBC Auto Differential [[id number]]  (Abnormal) Collected:  08/15/19 0312    Specimen:  Blood Updated:  08/15/19 0419     WBC 6.16 10*3/mm3      RBC 4.61 10*6/mm3      Hemoglobin 12.3 g/dL      Hematocrit 42.3 %      MCV 91.8 fL      MCH 26.7 pg      MCHC 29.1 g/dL      RDW 17.6 %      RDW-SD 59.1 fl      MPV 10.2 fL      Platelets 223 10*3/mm3      Neutrophil % 47.0 %      Lymphocyte % 39.1 %      Monocyte % 10.9 %       Eosinophil % 2.4 %      Basophil % 0.3 %      Immature Grans % 0.3 %      Neutrophils, Absolute 2.89 10*3/mm3      Lymphocytes, Absolute 2.41 10*3/mm3      Monocytes, Absolute 0.67 10*3/mm3      Eosinophils, Absolute 0.15 10*3/mm3      Basophils, Absolute 0.02 10*3/mm3      Immature Grans, Absolute 0.02 10*3/mm3      nRBC 0.0 /100 WBC     Urine Culture - Urine, Urine, Catheter [[id number]] Collected:  08/13/19 1314    Specimen:  Urine, Catheter Updated:  08/14/19 2014     Urine Culture 50,000 CFU/mL Mixed Salma Isolated    Narrative:       Specimen contains mixed organisms of questionable pathogenicity which indicates contamination with commensal salma.  Further identification is unlikely to provide clinically useful information.  Suggest recollection.        Imaging Results (last 24 hours)     ** No results found for the last 24 hours. **        EKG                              Rhythm/Rate: NSR 86  P waves and UT: nml  QRS, axis: LBBB   ST and T waves: non specific T wave changes        Current Facility-Administered Medications:   •  aspirin EC tablet 81 mg, 81 mg, Oral, Daily, Sourav Oakes MD, 81 mg at 08/15/19 0929  •  atorvastatin (LIPITOR) tablet 10 mg, 10 mg, Oral, Nightly, Sourav Oakes MD, 10 mg at 08/14/19 2016  •  calcium-vitamin D 500-200 MG-UNIT tablet 1,000 mg, 1,000 mg, Oral, Daily, Sourav Oakes MD, 1,000 mg at 08/15/19 0929  •  cefTRIAXone (ROCEPHIN) IVPB 1 g, 1 g, Intravenous, Q24H, Sourav Oakes MD, Stopped at 08/14/19 1734  •  Divalproex Sodium (DEPAKOTE SPRINKLE) capsule 125 mg, 125 mg, Oral, Q12H, Sourav Oakes MD, 125 mg at 08/15/19 0929  •  donepezil (ARICEPT) tablet 10 mg, 10 mg, Oral, Nightly, Sourav Oakes MD, 10 mg at 08/14/19 2016  •  enoxaparin (LOVENOX) syringe 30 mg, 30 mg, Subcutaneous, Q24H, Sourav Oakes MD, 30 mg at 08/14/19 1648  •  ketotifen (ZADITOR) 0.025 % ophthalmic solution 1 drop, 1 drop, Both Eyes, BID, Sourav Oakes MD, 1 drop at 08/15/19 8992  •  memantine (NAMENDA)  tablet 10 mg, 10 mg, Oral, Q12H, Lisandra Oakes MD, 10 mg at 08/15/19 0929  •  metoprolol tartrate (LOPRESSOR) tablet 25 mg, 25 mg, Oral, Q12H, Shazia Alva APRN, 25 mg at 08/15/19 1229  •  mirtazapine (REMERON) tablet 15 mg, 15 mg, Oral, Nightly, Lisandra Oakes MD, 15 mg at 08/14/19 2016  •  pantoprazole (PROTONIX) EC tablet 40 mg, 40 mg, Oral, QAM, Lisandra Oakes MD, 40 mg at 08/15/19 0635  •  [COMPLETED] Insert peripheral IV, , , Once **AND** sodium chloride 0.9 % flush 10 mL, 10 mL, Intravenous, PRN, Mehran Robbins PA, 10 mL at 08/15/19 0929  •  sodium chloride 0.9 % with KCl 20 mEq/L infusion, 75 mL/hr, Intravenous, Continuous, Lisandra Oakes MD, Last Rate: 75 mL/hr at 08/15/19 1227, 75 mL/hr at 08/15/19 1227  •  sulfaSALAzine (AZULFIDINE) tablet 500 mg, 500 mg, Oral, TID, Lisandra Oakes MD, 500 mg at 08/15/19 0929     ASSESSMENT  Acute kidney injury  Acute UTI  Chronic kidney disease stage III  Near-syncope  Paroxysmal atrial fibrillation with rapid ventricular rate  Dehydration  Dementia  Hypertension  Hyperlipidemia  Ulcerative colitis  Gastroesophageal reflux disease    PLAN  CPM  IV fluid  IV antibiotics  Adjust nursing home medications  Cardiology and nephrology consult appreciated  Cardiology consult  Supportive care  Stress ulcer DVT prophylaxis  PT/OT  Follow closely further recommendation according to hospital course    LISANDRA OAKES MD

## 2019-08-15 NOTE — PLAN OF CARE
Problem: Patient Care Overview  Goal: Plan of Care Review   08/15/19 5362   Coping/Psychosocial   Plan of Care Reviewed With patient   OTHER   Outcome Summary Pt presents close to baseline mobility as pt typically uses RW and wheelchair for all mobility. Pt walking 100' with CGA and RW with balance deficits noted. Would benefit from continued skilled PT while in this setting. Will require assist as needed upon return to residence. Pt goal to return to [facility].

## 2019-08-15 NOTE — PROGRESS NOTES
[facility]  Cardiology Progress Note    Patient Identification:  Name: Edson Myers  Age: 74 y.o.  Sex: male  :  1945  MRN: [id number]                 Follow Up / Chief Complaint: chronic AF     Interval History: BP elevated. Lisinopril held d/t KELLY       Subjective: Patient denies chest pain and shortness of breath at this time.       Objective:    Past Medical History:  Past Medical History:   Diagnosis Date   • Acute kidney injury (CMS/HCC)    • Altered mental status, unspecified    • Anemia    • Arthritis     bilateral hands   • Aspiration pneumonia (CMS/HCC)    • Atrial fibrillation (CMS/HCC)    • Carotid stenosis, bilateral    • Coronary artery disease    • Dementia    • Depression    • Diabetes mellitus (CMS/HCC)    • Dysphagia    • Encephalopathy acute    • GERD (gastroesophageal reflux disease)    • Hyperlipidemia    • Hypertension    • Hypocalcemia    • Left bundle branch block    • Leukocytosis    • LV dysfunction    • Septic shock (CMS/HCC)    • Stroke (CMS/HCC)    • Syncope    • Ulcerative colitis (CMS/HCC)      Past Surgical History:  Past Surgical History:   Procedure Laterality Date   • CARDIAC SURGERY     • GTUBE INSERTION     • KNEE SURGERY Left         Social History:   Social History     Tobacco Use   • Smoking status: Former Smoker   Substance Use Topics   • Alcohol use: No      Family History:  History reviewed. No pertinent family history.       Allergies:  No Known Allergies  Scheduled Meds:    aspirin 81 mg Daily   atorvastatin 10 mg Nightly   calcium-vitamin D 1,000 mg Daily   ceftriaxone 1 g Q24H   diltiaZEM 60 mg TID   Divalproex Sodium 125 mg Q12H   donepezil 10 mg Nightly   enoxaparin 30 mg Q24H   ketotifen 1 drop BID   memantine 10 mg Q12H   mirtazapine 15 mg Nightly   pantoprazole 40 mg QAM   sulfaSALAzine 500 mg TID       I have reviewed the patient's recent medical history and current medications, as well as personally reviewed/interpreted the  "ECG/telemetry data      INTAKE AND OUTPUT:    Intake/Output Summary (Last 24 hours) at 8/15/2019 1146  Last data filed at 8/15/2019 0900  Gross per 24 hour   Intake 2000 ml   Output 800 ml   Net 1200 ml       ROS  Constitutional: Awake and alert, no fever. No nosebleeds  Abdomen           no abdominal pain   Cardiac              no chest pain  Pulmonary          no shortness of breath      /86   Pulse 67   Temp 97.5 °F (36.4 °C) (Oral)   Resp 18   Ht 182 cm (71.65\")   Wt 110 kg (242 lb 8.1 oz)   SpO2 98%   BMI 33.21 kg/m²   General appearance: No acute changes   Neck: Trachea midline; NECK, supple, no thyromegaly or lymphadenopathy   Lungs: Normal size and shape, normal breath sounds, equal distribution of air, no rales and rhonchi   CV: S1-S2 regular, no murmurs, no rub, no gallop   Abdomen: Soft, non-tender; no masses , no abnormal abdominal sounds   Extremities: No deformity , normal color , no peripheral edema   Skin: Normal temperature, turgor and texture; no rash, ulcers            Cardiographics  Telemetry:   8/15 SR rate 70s       6 beat run VT 2056      EC/15 SR with LBBB rate 70s        18 SR with LBBB and PVCs        Echocardiogram:   19  Interpretation Summary     · Left ventricular systolic function is mildly decreased. Calculated EF = 50.0%. Estimated EF was in disagreement with the calculated EF. Estimated EF appears to be in the range of 41 - 45%. Normal left ventricular cavity size and wall thickness noted.  · The following segments are hypokinetic: basal inferior, basal inferolateral, mid inferior, mid inferolateral, mid anterolateral and apical lateral.  · Left ventricular diastolic dysfunction is noted (grade I) consistent with impaired relaxation.  · The aortic valve is not well visualized. The aortic valve is abnormal in structure. There is moderate calcification of the aortic valve.  · Mild to moderate aortic valve stenosis is present. It may be more " severe.  · Mild MAC is present.         Lab Review   Results from last 7 days   Lab Units 08/15/19  0312 08/13/19  1309   TROPONIN T ng/mL <0.010 0.013     Results from last 7 days   Lab Units 08/15/19  0312   MAGNESIUM mg/dL 1.9     Results from last 7 days   Lab Units 08/15/19  0312   SODIUM mmol/L 143   POTASSIUM mmol/L 4.4   BUN mg/dL 14   CREATININE mg/dL 1.28*   CALCIUM mg/dL 8.4*       Results from last 7 days   Lab Units 08/15/19  0312 08/14/19  0333 08/13/19  1309   WBC 10*3/mm3 6.16 7.85 11.48*   HEMOGLOBIN g/dL 12.3* 12.5* 15.5   HEMATOCRIT % 42.3 42.8 50.6   PLATELETS 10*3/mm3 223 281 314         Estimated Creatinine Clearance: 63.5 mL/min (A) (by C-G formula based on SCr of 1.28 mg/dL (H)).    I have reviewed the medical decision making with Dr. Rizzo in detail.       Assessment:  1.  VT  2.  KELLY in setting of CKD 3  3.  Abnormal ECG  4.  Anemia  5.  Hypertension  6.  Hyperlipidemia  7.  Ulcerative colitis  8.  GERD  9.  CAD s/p CABG     Plan:  Hypertensive in the setting of lisinopril being held due to KELLY in the setting of CKD 3.  Given patient's recent 5 to 6-week runs of VT will stop diltiazem and add beta-blockade.  Echo yesterday shows EF 50%, moderate calcification of aortic valve, mild MAC, mild to moderate aortic valve stenosis and the following segments are hypokinetic of the left ventricle: Basal inferior, basal inferolateral, mid inferior, mid inferolateral, mid anterolateral and apical lateral.  LV wall dysfunction is new in comparison to echo performed in August 2018 at [place].  Anemia appears stable from yesterday with hemoglobin of 12.3.  Creatinine trending up, now 1.23-nephrology following.  Patient receiving IV fluids. Discussed with patient, recommend stress testing in am as he has already eaten today.      It was explained to the patient that stress testing carries 85% specificity/sensitivity, and does not rule out future cardiac event.  Risks of the procedure were explained  "to the patient including shortness of breath, induction of myocardial infarction, and dizziness.  Patient is agreeable to proceeding with stress testing.         Labs/tests ordered: stop diltiazem, start metoprolol 25 mg BID, laura in am.       )8/15/2019  ANTON Kevin      Patient personally interviewed and above subjective findings personally confirmed during a face to face contact with patient today  All findings of physical examination confirmed  All pertinent and performed labs, cardiac procedures ,  radiographs of the last 24 hours personally reviewed  Impression and plans discussed/elaborated and implemented jointly as described above     Amaya Rizzo MD          EMR Dragon/Transcription:   \"Dictated utilizing Dragon dictation\".   "

## 2019-08-15 NOTE — PROGRESS NOTES
LOS: 1 day   Patient Care Team:  Natanael Fernandez MD as PCP - General (Internal Medicine)    Chief Complaint/ Reason for encounter: Acute on chronic renal failure  Chief Complaint   Patient presents with   • Syncope   • Dizziness         Subjective   No new complaints today, no further dizziness or syncope  No chest pain shortness of breath or fevers or chills  Good appetite with no nausea vomiting  No edema      Medical history reviewed:  History of Present Illness    Subjective    History taken from: Patient and chart    Objective     Vital Signs  Temp:  [97.5 °F (36.4 °C)-98 °F (36.7 °C)] 97.5 °F (36.4 °C)  Heart Rate:  [68-74] 68  Resp:  [16-18] 18  BP: (151-181)/(73-96) 159/73       Wt Readings from Last 1 Encounters:   08/14/19 1149 110 kg (242 lb 8.1 oz)   08/13/19 1546 110 kg (242 lb 8.1 oz)   08/13/19 1248 110 kg (243 lb)       Objective    Objective:  General Appearance:  Comfortable, well-appearing, in no acute distress and not in pain.  Output: Producing urine.    HEENT: Normal HEENT exam.    Lungs:  Normal effort and normal respiratory rate.  Breath sounds clear to auscultation.  No  respiratory distress.  No rales, decreased breath sounds or rhonchi.    Heart: Normal rate.  Regular rhythm.  S1 normal.  No murmur.   Abdomen: Abdomen is soft.  Bowel sounds are normal.   There is no abdominal tenderness.  There is no epigastric area or suprapubic area tenderness.  There is no rebound tenderness.     Extremities: Normal range of motion.  No significant edema  Pulses: Distal pulses are intact.    Neurological: Patient is alert and oriented to person, place and time.    Pupils:  Pupils are equal, round, and reactive to light.    Skin:  Warm and dry.  No rash or cyanosis.       Results Review:    Intake/Output:     Intake/Output Summary (Last 24 hours) at 8/15/2019 1347  Last data filed at 8/15/2019 1228  Gross per 24 hour   Intake 1000 ml   Output 1100 ml   Net -100 ml         DATA:  Radiology and Labs:  The  following labs independently reviewed by me. Additional labs ordered for tomorrow a.m.  Interval notes, chart personally reviewed by me.   Old records independently reviewed showing baseline creatinine around 1.1  The following radiologic studies independently viewed by me, findings renal ultrasound shows chronic renal disease and chronic benign cystic disease    Labs:   Recent Results (from the past 24 hour(s))   Basic Metabolic Panel    Collection Time: 08/15/19  3:12 AM   Result Value Ref Range    Glucose 71 65 - 99 mg/dL    BUN 14 8 - 23 mg/dL    Creatinine 1.28 (H) 0.76 - 1.27 mg/dL    Sodium 143 136 - 145 mmol/L    Potassium 4.4 3.5 - 5.2 mmol/L    Chloride 111 (H) 98 - 107 mmol/L    CO2 19.5 (L) 22.0 - 29.0 mmol/L    Calcium 8.4 (L) 8.6 - 10.5 mg/dL    eGFR Non African Amer 55 (L) >60 mL/min/1.73    BUN/Creatinine Ratio 10.9 7.0 - 25.0    Anion Gap 12.5 5.0 - 15.0 mmol/L   Magnesium    Collection Time: 08/15/19  3:12 AM   Result Value Ref Range    Magnesium 1.9 1.6 - 2.4 mg/dL   Troponin    Collection Time: 08/15/19  3:12 AM   Result Value Ref Range    Troponin T <0.010 0.000 - 0.030 ng/mL   CBC Auto Differential    Collection Time: 08/15/19  3:12 AM   Result Value Ref Range    WBC 6.16 3.40 - 10.80 10*3/mm3    RBC 4.61 4.14 - 5.80 10*6/mm3    Hemoglobin 12.3 (L) 13.0 - 17.7 g/dL    Hematocrit 42.3 37.5 - 51.0 %    MCV 91.8 79.0 - 97.0 fL    MCH 26.7 26.6 - 33.0 pg    MCHC 29.1 (L) 31.5 - 35.7 g/dL    RDW 17.6 (H) 12.3 - 15.4 %    RDW-SD 59.1 (H) 37.0 - 54.0 fl    MPV 10.2 6.0 - 12.0 fL    Platelets 223 140 - 450 10*3/mm3    Neutrophil % 47.0 42.7 - 76.0 %    Lymphocyte % 39.1 19.6 - 45.3 %    Monocyte % 10.9 5.0 - 12.0 %    Eosinophil % 2.4 0.3 - 6.2 %    Basophil % 0.3 0.0 - 1.5 %    Immature Grans % 0.3 0.0 - 0.5 %    Neutrophils, Absolute 2.89 1.70 - 7.00 10*3/mm3    Lymphocytes, Absolute 2.41 0.70 - 3.10 10*3/mm3    Monocytes, Absolute 0.67 0.10 - 0.90 10*3/mm3    Eosinophils, Absolute 0.15 0.00 - 0.40  10*3/mm3    Basophils, Absolute 0.02 0.00 - 0.20 10*3/mm3    Immature Grans, Absolute 0.02 0.00 - 0.05 10*3/mm3    nRBC 0.0 0.0 - 0.2 /100 WBC       Radiology:  Imaging Results (last 24 hours)     ** No results found for the last 24 hours. **             Medications have been reviewed:  Current Facility-Administered Medications   Medication Dose Route Frequency Provider Last Rate Last Dose   • aspirin EC tablet 81 mg  81 mg Oral Daily Sourav Oakes MD   81 mg at 08/15/19 0929   • atorvastatin (LIPITOR) tablet 10 mg  10 mg Oral Nightly Sourav Oakes MD   10 mg at 08/14/19 2016   • calcium-vitamin D 500-200 MG-UNIT tablet 1,000 mg  1,000 mg Oral Daily Sourav Oakes MD   1,000 mg at 08/15/19 0929   • cefTRIAXone (ROCEPHIN) IVPB 1 g  1 g Intravenous Q24H Sourav Oakes MD   Stopped at 08/14/19 1734   • Divalproex Sodium (DEPAKOTE SPRINKLE) capsule 125 mg  125 mg Oral Q12H Sourav Oakes MD   125 mg at 08/15/19 0929   • donepezil (ARICEPT) tablet 10 mg  10 mg Oral Nightly Sourav Oakes MD   10 mg at 08/14/19 2016   • enoxaparin (LOVENOX) syringe 30 mg  30 mg Subcutaneous Q24H Sourav Oakes MD   30 mg at 08/14/19 1648   • ketotifen (ZADITOR) 0.025 % ophthalmic solution 1 drop  1 drop Both Eyes BID Sourav Oakes MD   1 drop at 08/15/19 0930   • memantine (NAMENDA) tablet 10 mg  10 mg Oral Q12H Sourav Oakes MD   10 mg at 08/15/19 0929   • metoprolol tartrate (LOPRESSOR) tablet 25 mg  25 mg Oral Q12H Shazia Alva APRN   25 mg at 08/15/19 1229   • mirtazapine (REMERON) tablet 15 mg  15 mg Oral Nightly Sourav Oakes MD   15 mg at 08/14/19 2016   • pantoprazole (PROTONIX) EC tablet 40 mg  40 mg Oral QAM Sourav Oakes MD   40 mg at 08/15/19 0635   • sodium chloride 0.9 % flush 10 mL  10 mL Intravenous PRN Mehran Robbins PA   10 mL at 08/15/19 0929   • sodium chloride 0.9 % with KCl 20 mEq/L infusion  75 mL/hr Intravenous Continuous Sourav Oakes MD 75 mL/hr at 08/15/19 1227 75 mL/hr at 08/15/19 1227   • sulfaSALAzine  (AZULFIDINE) tablet 500 mg  500 mg Oral TID Sourav Oakes MD   500 mg at 08/15/19 0929       ASSESSMENT:   acute renal failure, improved, prerenal, ACE inhibitor contributing.  No obstruction on ultrasound  Stage III chronic kidney disease, near baseline  Syncope/presyncope  Chronic hypertension  Dementia  Mild dehydration  History of ulcerative colitis with chronic diarrhea  Acute, new urinary tract infection        PLAN:   Renal function improved, near baseline, stop IV fluids today  Hold ACE inhibitor, likely can restart tomorrow if he remains stable blood pressure elevated  Antibiotics IV  Stress test planned for a.m.  Continue current supportive care, Continue to monitor electrolytes and volume closely, avoid IV contrast and nephrotoxic medications      Please call with any questions or concerns.       Mayo Pennington MD   [facility]   Office phone number: [phone]  Answering service phone number: [phone]    08/15/19  1:47 PM    Dictation performed using Dragon dictation software

## 2019-08-15 NOTE — THERAPY EVALUATION
Patient Name: Edson Myers  : 1945    MRN: [id number]                              Today's Date: 8/15/2019       Admit Date: 2019    Visit Dx:     ICD-10-CM ICD-9-CM   1. Syncope, unspecified syncope type R55 780.2   2. Acute UTI N39.0 599.0   3. KELLY (acute kidney injury) (CMS/Formerly [facility]) N17.9 584.9     Patient Active Problem List   Diagnosis   • Syncope   • Acute UTI (urinary tract infection)     Past Medical History:   Diagnosis Date   • Acute kidney injury (CMS/HCC)    • Altered mental status, unspecified    • Anemia    • Arthritis     bilateral hands   • Aspiration pneumonia (CMS/[facility]ly [facility])    • Atrial fibrillation (CMS/Formerly [facility])    • Carotid stenosis, bilateral    • Coronary artery disease    • Dementia    • Depression    • Diabetes mellitus (CMS/[facility]ly [facility])    • Dysphagia    • Encephalopathy acute    • GERD (gastroesophageal reflux disease)    • Hyperlipidemia    • Hypertension    • Hypocalcemia    • Left bundle branch block    • Leukocytosis    • LV dysfunction    • Septic shock (CMS/[facility]ly [facility])    • Stroke (CMS/Formerly [facility])    • Syncope    • Ulcerative colitis (CMS/Formerly [facility])      Past Surgical History:   Procedure Laterality Date   • CARDIAC SURGERY     • GTUBE INSERTION     • KNEE SURGERY Left 1960     General Information     Row Name 08/15/19 1421          PT Evaluation Time/Intention    Document Type  evaluation  -MR     Mode of Treatment  physical therapy  -MR     Row Name 08/15/19 1421          General Information    Patient Profile Reviewed?  yes  -MR     Prior Level of Function  independent:;gait;transfer;ADL's  -MR     Existing Precautions/Restrictions  fall dementia  -MR     Barriers to Rehab  none identified  -MR     Row Name 08/15/19 1421          Relationship/Environment    Lives With  alone  -MR     Row Name 08/15/19 1421          Resource/Environmental Concerns    Current Living Arrangements  independent/assisted living facility [facility]  -MR     Row Name 08/15/19 1421          Cognitive Assessment/Intervention- PT/OT     Orientation Status (Cognition)  oriented to;person;place  -MR     Row Name 08/15/19 1421          Safety Issues, Functional Mobility    Safety Issues Affecting Function (Mobility)  awareness of need for assistance  -MR     Impairments Affecting Function (Mobility)  balance;cognition;endurance/activity tolerance  -MR     Comment, Safety Issues/Impairments (Mobility)  Pt states that he used RW for mobility and wheelchair for longer distances.  -MR       User Key  (r) = Recorded By, (t) = Taken By, (c) = Cosigned By    Initials Name Provider Type    Marcelle Arvizuline, PT Physical Therapist        Mobility     Row Name 08/15/19 1428          Bed Mobility Assessment/Treatment    Bed Mobility Assessment/Treatment  supine-sit  -MR     Supine-Sit [place] (Bed Mobility)  supervision  -MR     Comment (Bed Mobility)  No need for physical assist for bed mobility.  -MR     Row Name 08/15/19 1428          Sit-Stand Transfer    Sit-Stand [place] (Transfers)  contact guard  -MR     Assistive Device (Sit-Stand Transfers)  walker, front-wheeled  -MR     Row Name 08/15/19 1428          Gait/Stairs Assessment/Training    [place] Level (Gait)  contact guard  -MR     Assistive Device (Gait)  walker, front-wheeled  -MR     Distance in Feet (Gait)  100  -MR     Deviations/Abnormal Patterns (Gait)  thao decreased;gait speed decreased  -MR     Bilateral Gait Deviations  forward flexed posture  -MR     Comment (Gait/Stairs)  Pt with flexed posture needing cues for correction.  Generalized instability but no noted LOB.  -MR       User Key  (r) = Recorded By, (t) = Taken By, (c) = Cosigned By    Initials Name Provider Type    Marcelle Arvizuline, PT Physical Therapist        Obj/Interventions     Row Name 08/15/19 1431          General ROM    GENERAL ROM COMMENTS  BUE/BLE WFL  -MR     Row Name 08/15/19 1431          MMT (Manual Muscle Testing)    General MMT Comments  BUE >/= 4/5; BLE 4+/5  -MR       User Key  (r) =  Recorded By, (t) = Taken By, (c) = Cosigned By    Initials Name Provider Type    MR Mehta Jessica, PT Physical Therapist        Goals/Plan     Row Name 08/15/19 1433          Bed Mobility Goal 1 (PT)    Activity/Assistive Device (Bed Mobility Goal 1, PT)  bed mobility activities, all  -MR     [place] Level/Cues Needed (Bed Mobility Goal 1, PT)  conditional independence  -MR     Time Frame (Bed Mobility Goal 1, PT)  2 weeks  -MR     Row Name 08/15/19 1433          Transfer Goal 1 (PT)    Activity/Assistive Device (Transfer Goal 1, PT)  sit-to-stand/stand-to-sit  -MR     [place] Level/Cues Needed (Transfer Goal 1, PT)  supervision required  -MR     Time Frame (Transfer Goal 1, PT)  2 weeks  -MR     Row Name 08/15/19 1433          Gait Training Goal 1 (PT)    Activity/Assistive Device (Gait Training Goal 1, PT)  gait (walking locomotion);assistive device use;walker, rolling  -MR     [place] Level (Gait Training Goal 1, PT)  supervision required  -MR     Distance (Gait Goal 1, PT)  150  -MR     Time Frame (Gait Training Goal 1, PT)  2 weeks  -MR       User Key  (r) = Recorded By, (t) = Taken By, (c) = Cosigned By    Initials Name Provider Type    Marcelle Arvizuline, PT Physical Therapist        Clinical Impression     Row Name 08/15/19 1434 08/15/19 1431       Plan of Care Review    Plan of Care Reviewed With  patient  -MR  patient  -MR    Row Name 08/15/19 1406 08/15/19 0900       Plan of Care Review    Plan of Care Reviewed With  patient  -ES  patient  -ES    Row Name 08/15/19 1431          Physical Therapy Clinical Impression    Patient/Family Goals Statement (PT Clinical Impression)  Pt presents close to baseline mobility as pt typically uses RW and wheelchair for all mobility.  Pt walking 100' with CGA and RW with balance deficits noted.  Would benefit from continued skilled PT while in this setting.  Will require assist as needed upon return to residence.  Pt goal to return to [place].  -MR      Criteria for Skilled Interventions Met (PT Clinical Impression)  yes  -MR     Rehab Potential (PT Clinical Summary)  good, to achieve stated therapy goals  -MR     Predicted Duration of Therapy (PT)  2 weeks  -MR     Row Name 08/15/19 1431          Positioning and Restraints    Pre-Treatment Position  in bed  -MR     Post Treatment Position  other  -MR     Other Position  with OT  -MR       User Key  (r) = Recorded By, (t) = Taken By, (c) = Cosigned By    Initials Name Provider Type    Debbie Trevino, RN Registered Nurse     Tyson, Jessica, PT Physical Therapist        Outcome Measures     Row Name 08/15/19 1434          How much help from another person do you currently need...    Turning from your back to your side while in flat bed without using bedrails?  4  -MR     Moving from lying on back to sitting on the side of a flat bed without bedrails?  3  -MR     Moving to and from a bed to a chair (including a wheelchair)?  3  -MR     Standing up from a chair using your arms (e.g., wheelchair, bedside chair)?  3  -MR     Climbing 3-5 steps with a railing?  3  -MR     To walk in hospital room?  3  -MR     AM-PAC 6 Clicks Score (PT)  19  -MR     Row Name 08/15/19 1434          Functional Assessment    Outcome Measure Options  AM-PAC 6 Clicks Basic Mobility (PT)  -MR       User Key  (r) = Recorded By, (t) = Taken By, (c) = Cosigned By    Initials Name Provider Type     Tyson, TOMAS Lopez Physical Therapist        Physical Therapy Education     Title: PT OT SLP Therapies (Done)     Topic: Physical Therapy (Done)     Point: Mobility training (Done)     Learning Progress Summary           Patient Acceptance, E, VU,NR by MR at 8/15/2019  2:34 PM                               User Key     Initials Effective Dates Name Provider Type Discipline    MR 01/03/19 -  Jessica Mehta, TOMAS Physical Therapist PT              PT Recommendation and Plan     Outcome Summary/Treatment Plan (PT)  Anticipated Discharge  Disposition (PT): home with assist  Plan of Care Reviewed With: patient  Outcome Summary: Pt presents close to baseline mobility as pt typically uses RW and wheelchair for all mobility.  Pt walking 100' with CGA and RW with balance deficits noted.  Would benefit from continued skilled PT while in this setting.  Will require assist as needed upon return to residence.  Pt goal to return to [place].     Time Calculation:   PT Charges     Row Name 08/15/19 1435             Time Calculation    Start Time  1356  -MR      Stop Time  1405  -MR      Time Calculation (min)  9 min  -MR      PT Received On  08/15/19  -MR      PT - Next Appointment  08/16/19  -      PT Goal Re-Cert Due Date  08/29/19  -        User Key  (r) = Recorded By, (t) = Taken By, (c) = Cosigned By    Initials Name Provider Type     Jessica Mehta PT Physical Therapist        Therapy Charges for Today     Code Description Service Date Service Provider Modifiers Qty    11464028684 HC PT EVAL MOD COMPLEXITY 1 8/15/2019 Jessica Mehta PT GP 1          PT G-Codes  Outcome Measure Options: AM-PAC 6 Clicks Basic Mobility (PT)  AM-PAC 6 Clicks Score (PT): 19    Jessica Mehta PT  8/15/2019

## 2019-08-15 NOTE — DISCHARGE PLACEMENT REQUEST
"Edson Em (74 y.o. Male)     Date of Birth Social Security Number Address Home Phone MRN    1945  [address] [phone] [id number]    [place]oda[place] Marital Status          None Single       Admission Date Admission Type Admitting Provider Attending Provider Department, Room/Bed    8/13/19 Emergency Sourav Oakes MD Ahmed, Aftab, MD [address], [address]    Discharge Date Discharge Disposition Discharge Destination                       Attending Provider:  Sourav Oakes MD    Allergies:  No Known Allergies    Isolation:  None   Infection:  None   Code Status:  No CPR    Ht:  182 cm (71.65\")   Wt:  110 kg (242 lb 8.1 oz)    Admission Cmt:  None   Principal Problem:  None                Active Insurance as of 8/13/2019     Primary Coverage     Payor Plan Insurance Group Employer/Plan Group    MEDICARE MEDICARE A ONLY      Payor Plan Address Payor Plan Phone Number Payor Plan Fax Number Effective Dates    [address] [phone]  5/1/2007 - None Entered    [facility] [id number]       Subscriber Name Subscriber Birth Date Member ID       EDSON EM 1945 [id number]           Secondary Coverage     Payor Plan Insurance Group Employer/Plan Group    [place]      Payor Plan Address Payor Plan Phone Number Payor Plan Fax Number Effective Dates    [address] [phone]  3/26/2017 - None Entered    [place] [address]       Subscriber Name Subscriber Birth Date Member ID       EDSON EM 1945 [id number]                 Emergency Contacts      (Rel.) Home Phone Work Phone Mobile Phone    Sanjiv Em (Son) [phone] -- --              "

## 2019-08-15 NOTE — PLAN OF CARE
Problem: Patient Care Overview  Goal: Plan of Care Review   08/15/19 1613   Coping/Psychosocial   Plan of Care Reviewed With patient   OTHER   Outcome Summary Pt seen for initial evaluation, recommend skilled OT services to increase safety and independence with performance of ADLs.

## 2019-08-15 NOTE — PROGRESS NOTES
Discharge Planning Assessment  [facility]     Patient Name: Edson Myers  MRN: [id number]  Today's Date: 8/15/2019    Admit Date: 8/13/2019    Discharge Needs Assessment     Row Name 08/15/19 1623       Living Environment    Lives With  facility resident    Name([facility] of [facility] [place]    Current Living Arrangements  residential facility    Primary Care Provided by  other (see comments)    Provides Primary Care For  no one    Family Caregiver if Needed  child(minesh), adult    Family Caregiver Names  Sanjiv Myers son [phone]    Quality of Family Relationships  unable to assess    Able to Return to Prior Arrangements  yes       Resource/Environmental Concerns    Resource/Environmental Concerns  none    Transportation Concerns  car, none       Transition Planning    Patient/Family Anticipates Transition to  long term care facility    Patient/Family Anticipated Services at Transition      Transportation Anticipated  family or friend will provide       Discharge Needs Assessment    Readmission Within the Last 30 Days  no previous admission in last 30 days    Equipment Currently Used at Home  wheelchair    Discharge Facility/Level of Care Needs  nursing facility, basic    Offered/Gave Vendor List  no    Current Discharge Risk  chronically ill        Discharge Plan     Row Name 08/15/19 1622       Plan    Plan  Return to [place]    Plan Comments  Met with patient at bedside.  Face sheet verified. Prior to admission patient has been a resident at [facility] in [place].  Patient has a wheelchair. Patient's son Sanjiv Myers [phone] is his POA but we have no records on file here at the hospital. Discharge plan is to return to [place], family will transport. Will continue to monitor for new or changing discharge needs.        Destination      Service Provider Request Status Selected Services Address Phone Number Fax Number    [facility] Pending - Request Sent N/A  [address] [id number] [phone]       Samara Durán RN 8/15/2019 1352    8/15/2019 patient from this facility and has been there for 10 months.                  Durable Medical Equipment      No service coordination in this encounter.      Dialysis/Infusion      No service coordination in this encounter.      Home Medical Care      No service coordination in this encounter.      Therapy      No service coordination in this encounter.      Community Resources      No service coordination in this encounter.          Demographic Summary     Row Name 08/15/19 1621       General Information    Arrived From  emergency department    Referral Source  admission list    Reason for Consult  discharge planning    Preferred Language  English       Contact Information    Permission Granted to Share Info With  family/designee Sanjiv hennessy [phone]        Functional Status     Row Name 08/15/19 1622       Functional Status    Usual Activity Tolerance  moderate    Current Activity Tolerance  moderate       Functional Status, IADL    Medications  assistive person    Meal Preparation  assistive person       Mental Status    General Appearance WDL  WDL       Mental Status Summary    Recent Changes in Mental Status/Cognitive Functioning  no changes       Employment/    Employment Status  retired        Psychosocial    No documentation.       Abuse/Neglect    No documentation.       Legal    No documentation.       Substance Abuse    No documentation.       Patient Forms    No documentation.           Samara Durán RN

## 2019-08-15 NOTE — PLAN OF CARE
Problem: Patient Care Overview  Goal: Plan of Care Review  Outcome: Ongoing (interventions implemented as appropriate)   08/14/19 1821 08/15/19 1615 08/15/19 1620   Coping/Psychosocial   Plan of Care Reviewed With --  patient --    OTHER   Outcome Summary --  --  VSS. Denies complaints of dizziness/lightheadedness. Scheduled for Stress test in am. Up with PT, see notes. Up with assist to bathroom. Safety maintained. Continue to monitor.   Plan of Care Review   Progress no change --  --      Goal: Individualization and Mutuality  Outcome: Ongoing (interventions implemented as appropriate)    Goal: Discharge Needs Assessment  Outcome: Ongoing (interventions implemented as appropriate)    Goal: Interprofessional Rounds/Family Conf  Outcome: Ongoing (interventions implemented as appropriate)      Problem: Syncope (Adult)  Goal: Identify Related Risk Factors and Signs and Symptoms  Outcome: Ongoing (interventions implemented as appropriate)    Goal: Physical Safety/Health Maintenance  Outcome: Ongoing (interventions implemented as appropriate)    Goal: Optimal Emotional/Functional [place]  Outcome: Ongoing (interventions implemented as appropriate)      Problem: Fall Risk (Adult)  Goal: Identify Related Risk Factors and Signs and Symptoms  Outcome: Ongoing (interventions implemented as appropriate)    Goal: Absence of Fall  Outcome: Ongoing (interventions implemented as appropriate)

## 2019-08-16 ENCOUNTER — APPOINTMENT (OUTPATIENT)
Dept: NUCLEAR MEDICINE | Facility: HOSPITAL | Age: 74
End: 2019-08-16

## 2019-08-16 ENCOUNTER — APPOINTMENT (OUTPATIENT)
Dept: CARDIOLOGY | Facility: HOSPITAL | Age: 74
End: 2019-08-16

## 2019-08-16 VITALS
BODY MASS INDEX: 32.85 KG/M2 | HEIGHT: 72 IN | DIASTOLIC BLOOD PRESSURE: 86 MMHG | OXYGEN SATURATION: 98 % | SYSTOLIC BLOOD PRESSURE: 148 MMHG | RESPIRATION RATE: 18 BRPM | HEART RATE: 67 BPM | WEIGHT: 242.51 LBS | TEMPERATURE: 97.5 F

## 2019-08-16 LAB
ANION GAP SERPL CALCULATED.3IONS-SCNC: 13.9 MMOL/L (ref 5–15)
BASOPHILS # BLD AUTO: 0.03 10*3/MM3 (ref 0–0.2)
BASOPHILS NFR BLD AUTO: 0.5 % (ref 0–1.5)
BH CV STRESS COMMENTS STAGE 1: NORMAL
BH CV STRESS DOSE REGADENOSON STAGE 1: 0.4
BH CV STRESS DURATION MIN STAGE 1: 0
BH CV STRESS DURATION SEC STAGE 1: 10
BH CV STRESS PROTOCOL 1: NORMAL
BH CV STRESS RECOVERY BP: NORMAL MMHG
BH CV STRESS RECOVERY HR: 89 BPM
BH CV STRESS STAGE 1: 1
BUN BLD-MCNC: 8 MG/DL (ref 8–23)
BUN/CREAT SERPL: 7.7 (ref 7–25)
CALCIUM SPEC-SCNC: 8.6 MG/DL (ref 8.6–10.5)
CHLORIDE SERPL-SCNC: 107 MMOL/L (ref 98–107)
CO2 SERPL-SCNC: 19.1 MMOL/L (ref 22–29)
CREAT BLD-MCNC: 1.04 MG/DL (ref 0.76–1.27)
DEPRECATED RDW RBC AUTO: 58.2 FL (ref 37–54)
EOSINOPHIL # BLD AUTO: 0.19 10*3/MM3 (ref 0–0.4)
EOSINOPHIL NFR BLD AUTO: 3 % (ref 0.3–6.2)
ERYTHROCYTE [DISTWIDTH] IN BLOOD BY AUTOMATED COUNT: 17.3 % (ref 12.3–15.4)
GFR SERPL CREATININE-BSD FRML MDRD: 70 ML/MIN/1.73
GLUCOSE BLD-MCNC: 66 MG/DL (ref 65–99)
HCT VFR BLD AUTO: 42.7 % (ref 37.5–51)
HGB BLD-MCNC: 12.6 G/DL (ref 13–17.7)
IMM GRANULOCYTES # BLD AUTO: 0.03 10*3/MM3 (ref 0–0.05)
IMM GRANULOCYTES NFR BLD AUTO: 0.5 % (ref 0–0.5)
LV EF NUC BP: 38 %
LYMPHOCYTES # BLD AUTO: 2.2 10*3/MM3 (ref 0.7–3.1)
LYMPHOCYTES NFR BLD AUTO: 35 % (ref 19.6–45.3)
MAXIMAL PREDICTED HEART RATE: 145 BPM
MCH RBC QN AUTO: 26.8 PG (ref 26.6–33)
MCHC RBC AUTO-ENTMCNC: 29.5 G/DL (ref 31.5–35.7)
MCV RBC AUTO: 90.9 FL (ref 79–97)
MONOCYTES # BLD AUTO: 0.69 10*3/MM3 (ref 0.1–0.9)
MONOCYTES NFR BLD AUTO: 11 % (ref 5–12)
NEUTROPHILS # BLD AUTO: 3.14 10*3/MM3 (ref 1.7–7)
NEUTROPHILS NFR BLD AUTO: 50 % (ref 42.7–76)
NRBC BLD AUTO-RTO: 0 /100 WBC (ref 0–0.2)
PERCENT MAX PREDICTED HR: 66.21 %
PLATELET # BLD AUTO: 232 10*3/MM3 (ref 140–450)
PMV BLD AUTO: 10.3 FL (ref 6–12)
POTASSIUM BLD-SCNC: 3.8 MMOL/L (ref 3.5–5.2)
RBC # BLD AUTO: 4.7 10*6/MM3 (ref 4.14–5.8)
SODIUM BLD-SCNC: 140 MMOL/L (ref 136–145)
STRESS BASELINE BP: NORMAL MMHG
STRESS BASELINE HR: 72 BPM
STRESS PERCENT HR: 78 %
STRESS POST PEAK BP: NORMAL MMHG
STRESS POST PEAK HR: 96 BPM
STRESS TARGET HR: 123 BPM
WBC NRBC COR # BLD: 6.28 10*3/MM3 (ref 3.4–10.8)

## 2019-08-16 PROCEDURE — 78452 HT MUSCLE IMAGE SPECT MULT: CPT

## 2019-08-16 PROCEDURE — G0378 HOSPITAL OBSERVATION PER HR: HCPCS

## 2019-08-16 PROCEDURE — 85025 COMPLETE CBC W/AUTO DIFF WBC: CPT | Performed by: HOSPITALIST

## 2019-08-16 PROCEDURE — 93225 XTRNL ECG REC<48 HRS REC: CPT

## 2019-08-16 PROCEDURE — A9500 TC99M SESTAMIBI: HCPCS | Performed by: HOSPITALIST

## 2019-08-16 PROCEDURE — 93017 CV STRESS TEST TRACING ONLY: CPT

## 2019-08-16 PROCEDURE — 78452 HT MUSCLE IMAGE SPECT MULT: CPT | Performed by: INTERNAL MEDICINE

## 2019-08-16 PROCEDURE — 93018 CV STRESS TEST I&R ONLY: CPT | Performed by: INTERNAL MEDICINE

## 2019-08-16 PROCEDURE — 0 TECHNETIUM SESTAMIBI: Performed by: INTERNAL MEDICINE

## 2019-08-16 PROCEDURE — 25010000002 REGADENOSON 0.4 MG/5ML SOLUTION: Performed by: HOSPITALIST

## 2019-08-16 PROCEDURE — 0 TECHNETIUM SESTAMIBI: Performed by: HOSPITALIST

## 2019-08-16 PROCEDURE — 93226 XTRNL ECG REC<48 HR SCAN A/R: CPT

## 2019-08-16 PROCEDURE — A9500 TC99M SESTAMIBI: HCPCS | Performed by: INTERNAL MEDICINE

## 2019-08-16 PROCEDURE — 25010000002 CEFTRIAXONE PER 250 MG: Performed by: HOSPITALIST

## 2019-08-16 PROCEDURE — 80048 BASIC METABOLIC PNL TOTAL CA: CPT | Performed by: HOSPITALIST

## 2019-08-16 PROCEDURE — 99231 SBSQ HOSP IP/OBS SF/LOW 25: CPT | Performed by: INTERNAL MEDICINE

## 2019-08-16 RX ORDER — LISINOPRIL 20 MG/1
20 TABLET ORAL
Qty: 30 TABLET | Refills: 0 | Status: SHIPPED | OUTPATIENT
Start: 2019-08-17 | End: 2019-09-16

## 2019-08-16 RX ORDER — DILTIAZEM HYDROCHLORIDE 120 MG/1
120 CAPSULE, COATED, EXTENDED RELEASE ORAL
Qty: 30 CAPSULE | Refills: 0 | Status: SHIPPED | OUTPATIENT
Start: 2019-08-17 | End: 2019-09-16

## 2019-08-16 RX ORDER — LISINOPRIL 10 MG/1
10 TABLET ORAL
Status: DISCONTINUED | OUTPATIENT
Start: 2019-08-16 | End: 2019-08-16

## 2019-08-16 RX ORDER — LISINOPRIL 20 MG/1
20 TABLET ORAL
Status: DISCONTINUED | OUTPATIENT
Start: 2019-08-17 | End: 2019-08-16 | Stop reason: HOSPADM

## 2019-08-16 RX ORDER — DILTIAZEM HYDROCHLORIDE 120 MG/1
120 CAPSULE, COATED, EXTENDED RELEASE ORAL
Status: DISCONTINUED | OUTPATIENT
Start: 2019-08-16 | End: 2019-08-16 | Stop reason: HOSPADM

## 2019-08-16 RX ORDER — ATORVASTATIN CALCIUM 10 MG/1
10 TABLET, FILM COATED ORAL NIGHTLY
Qty: 30 TABLET | Refills: 0 | Status: SHIPPED | OUTPATIENT
Start: 2019-08-16 | End: 2019-09-15

## 2019-08-16 RX ADMIN — SULFASALAZINE 500 MG: 500 TABLET ORAL at 09:47

## 2019-08-16 RX ADMIN — DIVALPROEX SODIUM 125 MG: 125 CAPSULE, COATED PELLETS ORAL at 09:47

## 2019-08-16 RX ADMIN — CALCIUM CARBONATE-VITAMIN D TAB 500 MG-200 UNIT 1000 MG: 500-200 TAB at 09:46

## 2019-08-16 RX ADMIN — LISINOPRIL 10 MG: 10 TABLET ORAL at 09:47

## 2019-08-16 RX ADMIN — REGADENOSON 0.4 MG: 0.08 INJECTION, SOLUTION INTRAVENOUS at 08:19

## 2019-08-16 RX ADMIN — PANTOPRAZOLE SODIUM 40 MG: 40 TABLET, DELAYED RELEASE ORAL at 09:46

## 2019-08-16 RX ADMIN — MEMANTINE HYDROCHLORIDE 10 MG: 10 TABLET, FILM COATED ORAL at 09:46

## 2019-08-16 RX ADMIN — TECHNETIUM TC 99M SESTAMIBI 1 DOSE: 1 INJECTION INTRAVENOUS at 08:19

## 2019-08-16 RX ADMIN — METOPROLOL TARTRATE 25 MG: 25 TABLET ORAL at 09:44

## 2019-08-16 RX ADMIN — CEFTRIAXONE SODIUM 1 G: 1 INJECTION, SOLUTION INTRAVENOUS at 14:08

## 2019-08-16 RX ADMIN — KETOTIFEN FUMARATE 1 DROP: 0.35 SOLUTION/ DROPS OPHTHALMIC at 09:48

## 2019-08-16 RX ADMIN — TECHNETIUM TC 99M SESTAMIBI 1 DOSE: 1 INJECTION INTRAVENOUS at 06:20

## 2019-08-16 RX ADMIN — ASPIRIN 81 MG: 81 TABLET, COATED ORAL at 09:46

## 2019-08-16 NOTE — PROGRESS NOTES
"Daily progress note    Chief complaint  Doing better  No specific complaint   Wants to go back to nursing home    History of present illness  74-year-old white male with history of dementia ulcerative colitis hypertension hyperlipidemia and chronic kidney disease stage III who is a nursing home resident brought to the emergency room with generalized weakness for last few days with no appetite.  Patient denies any chest pain shortness of breath abdominal pain nausea vomiting but he does have chronic diarrhea and he almost passed out while taking shower.  Patient evaluated in ER found to be in acute kidney injury on top of chronic kidney disease also found to have UTI admit for management.  Patient fully alert oriented no specific complaint except does not want to eat and is still feeling very weak.  No other complaints.     REVIEW OF SYSTEMS  Unremarkable except generalized weakness    PHYSICAL EXAM  Blood pressure 148/86, pulse 67, temperature 97.5 °F (36.4 °C), temperature source Oral, resp. rate 18, height 182 cm (71.65\"), weight 110 kg (242 lb 8.1 oz), SpO2 98 %.    Constitutional: He is oriented to person, place, and time. No distress.   Head: Normocephalic and atraumatic.   Eyes: EOM are normal. Pupils are equal, round, and reactive to light.   Neck: Normal range of motion. Neck supple.   Cardiovascular: Normal rate, regular rhythm and normal heart sounds.   Pulmonary/Chest: Effort normal and breath sounds normal. No respiratory distress.   Abdominal: Soft. There is no tenderness. There is no rebound and no guarding.   Musculoskeletal: Normal range of motion. He exhibits no edema.   Neurological: He is alert and oriented to person, place, and time. He has normal sensation and normal strength.   Skin: Skin is warm and dry.   Psychiatric: Mood and affect normal.      LAB RESULTS  Lab Results (last 24 hours)     Procedure Component Value Units Date/Time    CBC & Differential [[id number]] Collected:  08/16/19 0538 "    Specimen:  Blood Updated:  08/16/19 0731    Narrative:       The following orders were created for panel order CBC & Differential.  Procedure                               Abnormality         Status                     ---------                               -----------         ------                     CBC Auto Differential[[id number]]        Abnormal            Final result                 Please view results for these tests on the individual orders.    CBC Auto Differential [[id number]]  (Abnormal) Collected:  08/16/19 0538    Specimen:  Blood Updated:  08/16/19 0731     WBC 6.28 10*3/mm3      RBC 4.70 10*6/mm3      Hemoglobin 12.6 g/dL      Hematocrit 42.7 %      MCV 90.9 fL      MCH 26.8 pg      MCHC 29.5 g/dL      RDW 17.3 %      RDW-SD 58.2 fl      MPV 10.3 fL      Platelets 232 10*3/mm3      Neutrophil % 50.0 %      Lymphocyte % 35.0 %      Monocyte % 11.0 %      Eosinophil % 3.0 %      Basophil % 0.5 %      Immature Grans % 0.5 %      Neutrophils, Absolute 3.14 10*3/mm3      Lymphocytes, Absolute 2.20 10*3/mm3      Monocytes, Absolute 0.69 10*3/mm3      Eosinophils, Absolute 0.19 10*3/mm3      Basophils, Absolute 0.03 10*3/mm3      Immature Grans, Absolute 0.03 10*3/mm3      nRBC 0.0 /100 WBC     Basic Metabolic Panel [[id number]]  (Abnormal) Collected:  08/16/19 0538    Specimen:  Blood Updated:  08/16/19 0656     Glucose 66 mg/dL      BUN 8 mg/dL      Creatinine 1.04 mg/dL      Sodium 140 mmol/L      Potassium 3.8 mmol/L      Chloride 107 mmol/L      CO2 19.1 mmol/L      Calcium 8.6 mg/dL      eGFR Non African Amer 70 mL/min/1.73      BUN/Creatinine Ratio 7.7     Anion Gap 13.9 mmol/L     Narrative:       GFR Normal >60  Chronic Kidney Disease <60  Kidney Failure <15        Imaging Results (last 24 hours)     Procedure Component Value Units Date/Time    US Renal Bilateral [[id number]] Collected:  08/13/19 2226     Updated:  08/15/19 2146    Narrative:       BILATERAL RENAL ULTRASOUND     CLINICAL HISTORY:  KELLY; R55-Syncope and collapse; N39.0-Urinary tract  infection, site not specified; N17.9-Acute kidney failure, unspecified     FINDINGS: Longitudinal and transverse images of the kidneys were  obtained. The right kidney measures 10.2 cm in length. The left kidney  measures 11.5 cm in length. There is cortical renal atrophy bilaterally.  There are 2 cystic-appearing right renal lesions. The larger measures 3  cm. The smaller 1.7 cm. No solid or suspicious mass. No hydronephrosis.  Bladder poorly distended and not well seen.          Impression:        Bilateral atrophy with 2 right-sided cystic lesions.     This report was finalized on 8/15/2019 9:43 PM by Mal Tim M.D.           EKG                              Rhythm/Rate: NSR 86  P waves and CT: nml  QRS, axis: LBBB   ST and T waves: non specific T wave changes      2D echo showed ejection fraction 40%  Stress Cardiolite showed no ischemia      Current Facility-Administered Medications:   •  aspirin EC tablet 81 mg, 81 mg, Oral, Daily, Sourav Oakes MD, 81 mg at 08/16/19 0946  •  atorvastatin (LIPITOR) tablet 10 mg, 10 mg, Oral, Nightly, Sourav Oakes MD, 10 mg at 08/15/19 2114  •  calcium-vitamin D 500-200 MG-UNIT tablet 1,000 mg, 1,000 mg, Oral, Daily, Sourav Oakes MD, 1,000 mg at 08/16/19 0946  •  cefTRIAXone (ROCEPHIN) IVPB 1 g, 1 g, Intravenous, Q24H, Sourav Oakes MD, Last Rate: 100 mL/hr at 08/16/19 1408, 1 g at 08/16/19 1408  •  diltiaZEM CD (CARDIZEM CD) 24 hr capsule 120 mg, 120 mg, Oral, Q24H, Shazia Alva APRN  •  Divalproex Sodium (DEPAKOTE SPRINKLE) capsule 125 mg, 125 mg, Oral, Q12H, Sourav Oakes MD, 125 mg at 08/16/19 0947  •  donepezil (ARICEPT) tablet 10 mg, 10 mg, Oral, Nightly, Sourav Oakes MD, 10 mg at 08/15/19 2114  •  enoxaparin (LOVENOX) syringe 30 mg, 30 mg, Subcutaneous, Q24H, Sourav Oakes MD, 30 mg at 08/15/19 1719  •  ipratropium-albuterol (DUO-NEB) nebulizer solution 3 mL, 3 mL, Nebulization, Q4H PRN, Sourav Oakes MD,  3 mL at 08/15/19 2148  •  ketotifen (ZADITOR) 0.025 % ophthalmic solution 1 drop, 1 drop, Both Eyes, BID, Lisandra Oakes MD, 1 drop at 08/16/19 0948  •  [START ON 8/17/2019] lisinopril (PRINIVIL,ZESTRIL) tablet 20 mg, 20 mg, Oral, Q24H, Shazia Alva, APRN  •  memantine (NAMENDA) tablet 10 mg, 10 mg, Oral, Q12H, Lisandra Oakes MD, 10 mg at 08/16/19 0946  •  mirtazapine (REMERON) tablet 15 mg, 15 mg, Oral, Nightly, Lisandra Oakes MD, 15 mg at 08/15/19 2114  •  pantoprazole (PROTONIX) EC tablet 40 mg, 40 mg, Oral, QAM, Lisandra Oakes MD, 40 mg at 08/16/19 0946  •  [COMPLETED] Insert peripheral IV, , , Once **AND** sodium chloride 0.9 % flush 10 mL, 10 mL, Intravenous, PRN, Mehran Robbins PA, 10 mL at 08/15/19 2114  •  sulfaSALAzine (AZULFIDINE) tablet 500 mg, 500 mg, Oral, TID, Lisandra Oakes MD, 500 mg at 08/16/19 0947     ASSESSMENT  Acute kidney injury  Acute UTI  Chronic kidney disease stage III  Near-syncope  Paroxysmal atrial fibrillation with rapid ventricular rate  Dehydration  Dementia  Hypertension  Hyperlipidemia  Ulcerative colitis  Gastroesophageal reflux disease    PLAN  Discharge back to nursing home  Discharge summary dictated    LISANDRA OAKES MD

## 2019-08-16 NOTE — DISCHARGE INSTRUCTIONS
Holter Monitor  Do not get wet.  Remove after 11:15 on Saturday 8/17/2019.  Bring back to Cardiovascular  at [facility].

## 2019-08-16 NOTE — DISCHARGE SUMMARY
Discharge summary    Date of admission 8/13/2019  Date of discharge 8/16/2019    Final diagnosis  Acute kidney injury resolved  Acute UTI completed antibiotics  Chronic kidney disease stage III  Paroxysmal atrial fibrillation with rapid ventricular rate  Dehydration resolved  Dementia  Hypertension  Hyperlipidemia  Ulcerative colitis  Gastroesophageal reflux disease    Discharge medications    Current Facility-Administered Medications:   •  aspirin EC tablet 81 mg, 81 mg, Oral, Daily, Sourav Oakes MD, 81 mg at 08/16/19 0946  •  atorvastatin (LIPITOR) tablet 10 mg, 10 mg, Oral, Nightly, Sourav Oakes MD, 10 mg at 08/15/19 2114  •  calcium-vitamin D 500-200 MG-UNIT tablet 1,000 mg, 1,000 mg, Oral, Daily, Sourav Oakes MD, 1,000 mg at 08/16/19 0946  •  cefTRIAXone (ROCEPHIN) IVPB 1 g, 1 g, Intravenous, Q24H, Sourav Oakes MD, Last Rate: 100 mL/hr at 08/16/19 1408, 1 g at 08/16/19 1408  •  diltiaZEM CD (CARDIZEM CD) 24 hr capsule 120 mg, 120 mg, Oral, Q24H, Shazia Alva, APRN  •  Divalproex Sodium (DEPAKOTE SPRINKLE) capsule 125 mg, 125 mg, Oral, Q12H, Sourav Oakes MD, 125 mg at 08/16/19 0947  •  donepezil (ARICEPT) tablet 10 mg, 10 mg, Oral, Nightly, Sourav Oakes MD, 10 mg at 08/15/19 2114  •  enoxaparin (LOVENOX) syringe 30 mg, 30 mg, Subcutaneous, Q24H, Sourav Oakes MD, 30 mg at 08/15/19 1719  •  ipratropium-albuterol (DUO-NEB) nebulizer solution 3 mL, 3 mL, Nebulization, Q4H PRN, Sourav Oakes MD, 3 mL at 08/15/19 2148  •  ketotifen (ZADITOR) 0.025 % ophthalmic solution 1 drop, 1 drop, Both Eyes, BID, Sourav Oakes MD, 1 drop at 08/16/19 0948  •  [START ON 8/17/2019] lisinopril (PRINIVIL,ZESTRIL) tablet 20 mg, 20 mg, Oral, Q24H, Shazia Alva APRN  •  memantine (NAMENDA) tablet 10 mg, 10 mg, Oral, Q12H, Sourav Oakes MD, 10 mg at 08/16/19 0946  •  mirtazapine (REMERON) tablet 15 mg, 15 mg, Oral, Nightly, Sourav Oakes MD, 15 mg at 08/15/19 2114  •  pantoprazole (PROTONIX) EC tablet 40 mg, 40 mg, Oral,  Violette DANIELS Aftab, MD, 40 mg at 08/16/19 0943  •  [COMPLETED] Insert peripheral IV, , , Once **AND** sodium chloride 0.9 % flush 10 mL, 10 mL, Intravenous, PRN, Mehran Robbins PA, 10 mL at 08/15/19 9864  •  sulfaSALAzine (AZULFIDINE) tablet 500 mg, 500 mg, Oral, TID, Lisandra Oakes MD, 500 mg at 08/16/19 0926     Consults obtained  Cardiology  Nephrology    Procedures  Stress Cardiolite showed no ischemia    Hospital course  74-year-old white male with history of dementia ulcerative colitis hypertension hyperlipidemia and chronic kidney disease admitted to emergency room with generalized weakness.  Patient work-up revealed UTI dehydration and acute kidney injury.  Patient also found to be in and out A. fib with with rapid ventricular rate.  Patient received fluids and his kidney function returned to normal he was on ACE inhibitor which was held and restarted.  Patient underwent stress Cardiolite which showed no ischemia and his 2D echo unchanged with ejection fraction around 40%.  Patient is eating better and no complaint wants to go back to nursing home.  Patient restarted on ACE inhibitor no need for chronic anticoagulation per cardiology and he has completed antibiotics and his cultures shows mixed colonies 50,000.  Patient remain DNR throughout hospital course.    Discharge diet regular    Activity per PT OT    Medication as above    Further care per accepting physician at nursing home    LISANDRA OAKES MD

## 2019-08-16 NOTE — PROGRESS NOTES
[facility]  Cardiology Progress Note    Patient Identification:  Name: Edson Myers  Age: 74 y.o.  Sex: male  :  1945  MRN: [id number]                 Follow Up / Chief Complaint: chronic AF     Interval History: 3.2 second pause noted on BB, but no further VE.  Stress pending.        Subjective: Denies chest pain and shortness of breath as well as syncope and near syncope      Objective:    Past Medical History:  Past Medical History:   Diagnosis Date   • Acute kidney injury (CMS/HCC)    • Altered mental status, unspecified    • Anemia    • Arthritis     bilateral hands   • Aspiration pneumonia (CMS/HCC)    • Atrial fibrillation (CMS/HCC)    • Carotid stenosis, bilateral    • Coronary artery disease    • Dementia    • Depression    • Diabetes mellitus (CMS/HCC)    • Dysphagia    • Encephalopathy acute    • GERD (gastroesophageal reflux disease)    • Hyperlipidemia    • Hypertension    • Hypocalcemia    • Left bundle branch block    • Leukocytosis    • LV dysfunction    • Septic shock (CMS/HCC)    • Stroke (CMS/HCC)    • Syncope    • Ulcerative colitis (CMS/HCC)      Past Surgical History:  Past Surgical History:   Procedure Laterality Date   • CARDIAC SURGERY     • GTUBE INSERTION     • KNEE SURGERY Left         Social History:   Social History     Tobacco Use   • Smoking status: Former Smoker   Substance Use Topics   • Alcohol use: No      Family History:  History reviewed. No pertinent family history.       Allergies:  No Known Allergies  Scheduled Meds:    aspirin 81 mg Daily   atorvastatin 10 mg Nightly   calcium-vitamin D 1,000 mg Daily   ceftriaxone 1 g Q24H   Divalproex Sodium 125 mg Q12H   donepezil 10 mg Nightly   enoxaparin 30 mg Q24H   ketotifen 1 drop BID   lisinopril 10 mg Q24H   memantine 10 mg Q12H   metoprolol tartrate 25 mg Q12H   mirtazapine 15 mg Nightly   pantoprazole 40 mg QAM   sulfaSALAzine 500 mg TID     I have reviewed the patient's recent medical history and  "current medications, as well as personally reviewed/interpreted the ECG/telemetry data    INTAKE AND OUTPUT:    Intake/Output Summary (Last 24 hours) at 2019 1100  Last data filed at 2019 0944  Gross per 24 hour   Intake 420 ml   Output 1925 ml   Net -1505 ml       ROS  Constitutional: Awake and alert, no fever. No nosebleeds  Abdomen           no abdominal pain   Cardiac              no chest pain  Pulmonary          no shortness of breath      /86   Pulse 67   Temp 97.5 °F (36.4 °C) (Oral)   Resp 18   Ht 182 cm (71.65\")   Wt 110 kg (242 lb 8.1 oz)   SpO2 98%   BMI 33.21 kg/m²   General appearance: No acute changes   Neck: Trachea midline; NECK, supple, no thyromegaly or lymphadenopathy   Lungs: Normal size and shape, normal breath sounds, equal distribution of air, no rales and rhonchi   CV: S1-S2 regular, no murmurs, no rub, no gallop   Abdomen: Soft, non-tender; no masses , no abnormal abdominal sounds   Extremities: No deformity , normal color , no peripheral edema   Skin: Normal temperature, turgor and texture; no rash, ulcers            Cardiographics  Telemetry:   SR rate 60s       8/15 1734 3.2 second pause      EC/15 SR with LBBB rate 70s        18 SR with LBBB and PVCs        Echocardiogram:   19  Interpretation Summary     · Left ventricular systolic function is mildly decreased. Calculated EF = 50.0%. Estimated EF was in disagreement with the calculated EF. Estimated EF appears to be in the range of 41 - 45%. Normal left ventricular cavity size and wall thickness noted.  · The following segments are hypokinetic: basal inferior, basal inferolateral, mid inferior, mid inferolateral, mid anterolateral and apical lateral.  · Left ventricular diastolic dysfunction is noted (grade I) consistent with impaired relaxation.  · The aortic valve is not well visualized. The aortic valve is abnormal in structure. There is moderate calcification of the aortic valve.  · Mild " to moderate aortic valve stenosis is present. It may be more severe.  · Mild MAC is present.         Lab Review   Results from last 7 days   Lab Units 08/15/19  0312 08/13/19  1309   TROPONIN T ng/mL <0.010 0.013     Results from last 7 days   Lab Units 08/15/19  0312   MAGNESIUM mg/dL 1.9     Results from last 7 days   Lab Units 08/16/19  0538   SODIUM mmol/L 140   POTASSIUM mmol/L 3.8   BUN mg/dL 8   CREATININE mg/dL 1.04   CALCIUM mg/dL 8.6       Results from last 7 days   Lab Units 08/16/19  0538 08/15/19  0312 08/14/19  0333   WBC 10*3/mm3 6.28 6.16 7.85   HEMOGLOBIN g/dL 12.6* 12.3* 12.5*   HEMATOCRIT % 42.7 42.3 42.8   PLATELETS 10*3/mm3 232 223 281         Estimated Creatinine Clearance: 78.2 mL/min (by C-G formula based on SCr of 1.04 mg/dL).    I have reviewed the medical decision making with Dr. Rizzo in detail.       Assessment:  1.  VT  2.  KELLY in setting of CKD 3  3.  Abnormal ECG  4.  Anemia  5.  Hypertension  6.  Hyperlipidemia  7.  Ulcerative colitis  8.  GERD  9.  CAD s/p CABG     Plan:  Now 3.2-second pause on beta-blockade.  No further VE noted overnight.  Will discontinue beta-blockade and resume diltiazem.  Blood pressure remains elevated, will increase lisinopril.  Stress test planned for today.  Further plan of care dependent upon these results.  Anemia stable with hemoglobin at 12.6.  Troponin negative at 0.01 yesterday morning.  Echo as above.  Will place Holter on patient to keep a closer eye on possible dysrhythmia.        Labs/tests ordered: BMP, mag, Holter      )8/16/2019  ANTON Kevin      Patient personally interviewed and above subjective findings personally confirmed during a face to face contact with patient today  All findings of physical examination confirmed  All pertinent and performed labs, cardiac procedures ,  radiographs of the last 24 hours personally reviewed  Impression and plans discussed/elaborated and implemented jointly as described above  "    MD PORTER Cuello/Transcription:   \"Dictated utilizing Dragon dictation\".   "

## 2019-08-16 NOTE — PROGRESS NOTES
LOS: 1 day   Patient Care Team:  Natanael Fernandez MD as PCP - General (Internal Medicine)    Chief Complaint/ Reason for encounter: Acute on chronic renal failure  Chief Complaint   Patient presents with   • Syncope   • Dizziness         Subjective   No new complaints today, no further dizziness or syncope  Patient was seen in cardiac stress test, just completed, no complaints of chest pain or shortness of breath  No chest pain shortness of breath or fevers or chills  Good appetite with no nausea vomiting  No edema      Medical history reviewed:  History of Present Illness    Subjective    History taken from: Patient and chart    Objective     Vital Signs  Temp:  [97.4 °F (36.3 °C)-97.9 °F (36.6 °C)] 97.5 °F (36.4 °C)  Heart Rate:  [65-78] 75  Resp:  [17-18] 18  BP: (150-171)/(73-92) 160/92       Wt Readings from Last 1 Encounters:   08/14/19 1149 110 kg (242 lb 8.1 oz)   08/13/19 1546 110 kg (242 lb 8.1 oz)   08/13/19 1248 110 kg (243 lb)       Objective    Objective:  General Appearance:  Comfortable, well-appearing, in no acute distress and not in pain.  Output: Producing urine.    HEENT: Normal HEENT exam.    Lungs:  Normal effort and normal respiratory rate.  Breath sounds clear to auscultation.  No  respiratory distress.  No rales, decreased breath sounds or rhonchi.    Heart: Normal rate.  Regular rhythm.  S1 normal.  No murmur.   Abdomen: Abdomen is soft.  Bowel sounds are normal.   There is no abdominal tenderness.  There is no epigastric area or suprapubic area tenderness.  There is no rebound tenderness.     Extremities: Normal range of motion.  No significant edema  Pulses: Distal pulses are intact.    Neurological: Patient is alert and oriented to person, place and time.    Pupils:  Pupils are equal, round, and reactive to light.    Skin:  Warm and dry.  No rash or cyanosis.       Results Review:    Intake/Output:     Intake/Output Summary (Last 24 hours) at 8/16/2019 0948  Last data filed at 8/16/2019  0708  Gross per 24 hour   Intake 360 ml   Output 1925 ml   Net -1565 ml         DATA:  Radiology and Labs:  The following labs independently reviewed by me. Additional labs ordered for tomorrow a.m.  Interval notes, chart personally reviewed by me.   Old records independently reviewed showing baseline creatinine around 1.1  The following radiologic studies independently viewed by me, findings renal ultrasound shows chronic renal disease and chronic benign cystic disease    Labs:   Recent Results (from the past 24 hour(s))   Basic Metabolic Panel    Collection Time: 08/16/19  5:38 AM   Result Value Ref Range    Glucose 66 65 - 99 mg/dL    BUN 8 8 - 23 mg/dL    Creatinine 1.04 0.76 - 1.27 mg/dL    Sodium 140 136 - 145 mmol/L    Potassium 3.8 3.5 - 5.2 mmol/L    Chloride 107 98 - 107 mmol/L    CO2 19.1 (L) 22.0 - 29.0 mmol/L    Calcium 8.6 8.6 - 10.5 mg/dL    eGFR Non African Amer 70 >60 mL/min/1.73    BUN/Creatinine Ratio 7.7 7.0 - 25.0    Anion Gap 13.9 5.0 - 15.0 mmol/L   CBC Auto Differential    Collection Time: 08/16/19  5:38 AM   Result Value Ref Range    WBC 6.28 3.40 - 10.80 10*3/mm3    RBC 4.70 4.14 - 5.80 10*6/mm3    Hemoglobin 12.6 (L) 13.0 - 17.7 g/dL    Hematocrit 42.7 37.5 - 51.0 %    MCV 90.9 79.0 - 97.0 fL    MCH 26.8 26.6 - 33.0 pg    MCHC 29.5 (L) 31.5 - 35.7 g/dL    RDW 17.3 (H) 12.3 - 15.4 %    RDW-SD 58.2 (H) 37.0 - 54.0 fl    MPV 10.3 6.0 - 12.0 fL    Platelets 232 140 - 450 10*3/mm3    Neutrophil % 50.0 42.7 - 76.0 %    Lymphocyte % 35.0 19.6 - 45.3 %    Monocyte % 11.0 5.0 - 12.0 %    Eosinophil % 3.0 0.3 - 6.2 %    Basophil % 0.5 0.0 - 1.5 %    Immature Grans % 0.5 0.0 - 0.5 %    Neutrophils, Absolute 3.14 1.70 - 7.00 10*3/mm3    Lymphocytes, Absolute 2.20 0.70 - 3.10 10*3/mm3    Monocytes, Absolute 0.69 0.10 - 0.90 10*3/mm3    Eosinophils, Absolute 0.19 0.00 - 0.40 10*3/mm3    Basophils, Absolute 0.03 0.00 - 0.20 10*3/mm3    Immature Grans, Absolute 0.03 0.00 - 0.05 10*3/mm3    nRBC 0.0 0.0 -  0.2 /100 WBC   Stress Test With Myocardial Perfusion One Day    Collection Time: 08/16/19  9:18 AM   Result Value Ref Range    BH CV STRESS PROTOCOL 1 Pharmacologic     Stage 1 1     Duration Min Stage 1 0     Duration Sec Stage 1 10     Stress Dose Regadenoson Stage 1 0.4     Stress Comments Stage 1 10 sec bolus injection     Baseline HR 72 bpm    Baseline /80 mmHg    Peak HR 96 bpm    Percent Max Pred HR 66.21 %    Percent Target HR 78 %    Peak /92 mmHg    Recovery HR 89 bpm    Recovery /83 mmHg    Target HR (85%) 123 bpm    Max. Pred. HR (100%) 145 bpm       Radiology:  Imaging Results (last 24 hours)     Procedure Component Value Units Date/Time    US Renal Bilateral [[id number]] Collected:  08/13/19 2226     Updated:  08/15/19 2146    Narrative:       BILATERAL RENAL ULTRASOUND     CLINICAL HISTORY: KELLY; R55-Syncope and collapse; N39.0-Urinary tract  infection, site not specified; N17.9-Acute kidney failure, unspecified     FINDINGS: Longitudinal and transverse images of the kidneys were  obtained. The right kidney measures 10.2 cm in length. The left kidney  measures 11.5 cm in length. There is cortical renal atrophy bilaterally.  There are 2 cystic-appearing right renal lesions. The larger measures 3  cm. The smaller 1.7 cm. No solid or suspicious mass. No hydronephrosis.  Bladder poorly distended and not well seen.          Impression:        Bilateral atrophy with 2 right-sided cystic lesions.     This report was finalized on 8/15/2019 9:43 PM by Mal Tim M.D.                Medications have been reviewed:  Current Facility-Administered Medications   Medication Dose Route Frequency Provider Last Rate Last Dose   • aspirin EC tablet 81 mg  81 mg Oral Daily Sourav Oakes MD   81 mg at 08/16/19 0946   • atorvastatin (LIPITOR) tablet 10 mg  10 mg Oral Nightly Sourav Oakes MD   10 mg at 08/15/19 2114   • calcium-vitamin D 500-200 MG-UNIT tablet 1,000 mg  1,000 mg Oral Daily Sourav Oakes  MD   1,000 mg at 08/16/19 0946   • cefTRIAXone (ROCEPHIN) IVPB 1 g  1 g Intravenous Q24H Sourav Oakes  mL/hr at 08/15/19 1525 1 g at 08/15/19 1525   • Divalproex Sodium (DEPAKOTE SPRINKLE) capsule 125 mg  125 mg Oral Q12H Sourav Oakes MD   125 mg at 08/16/19 0947   • donepezil (ARICEPT) tablet 10 mg  10 mg Oral Nightly Sourav Oakes MD   10 mg at 08/15/19 2114   • enoxaparin (LOVENOX) syringe 30 mg  30 mg Subcutaneous Q24H Sourav Oakes MD   30 mg at 08/15/19 1719   • ipratropium-albuterol (DUO-NEB) nebulizer solution 3 mL  3 mL Nebulization Q4H PRN Sourav Oakes MD   3 mL at 08/15/19 2148   • ketotifen (ZADITOR) 0.025 % ophthalmic solution 1 drop  1 drop Both Eyes BID Sourav Oakes MD   1 drop at 08/16/19 0948   • lisinopril (PRINIVIL,ZESTRIL) tablet 10 mg  10 mg Oral Q24H Mayo Pennington MD   10 mg at 08/16/19 0947   • memantine (NAMENDA) tablet 10 mg  10 mg Oral Q12H Sourav Oakes MD   10 mg at 08/16/19 0946   • metoprolol tartrate (LOPRESSOR) tablet 25 mg  25 mg Oral Q12H Shazia Alva APRN   25 mg at 08/16/19 0944   • mirtazapine (REMERON) tablet 15 mg  15 mg Oral Nightly Sourav Oakes MD   15 mg at 08/15/19 2114   • pantoprazole (PROTONIX) EC tablet 40 mg  40 mg Oral QAM Sourav Oakes MD   40 mg at 08/16/19 0946   • sodium chloride 0.9 % flush 10 mL  10 mL Intravenous PRN Mehran Robbins PA   10 mL at 08/15/19 2114   • sulfaSALAzine (AZULFIDINE) tablet 500 mg  500 mg Oral TID Sourav Oakes MD   500 mg at 08/16/19 0947       ASSESSMENT:   acute renal failure, improved, prerenal, ACE inhibitor contributing.  No obstruction on ultrasound, near baseline  Stage III chronic kidney disease, near baseline  Syncope/presyncope  Chronic hypertension  Dementia  Mild dehydration  History of ulcerative colitis with chronic diarrhea  Acute, new urinary tract infection        PLAN:   Renal function improved, near baseline off IV fluids  Restart low-dose ACE inhibitor today  Renal function near baseline  Hold  ACE inhibitor, likely can restart tomorrow if he remains stable blood pressure elevated  Transition to oral antibiotics soon  Await stress test results  discharge planning    Please call with any questions or concerns.       Mayo Pennington MD   [facility]   Office phone number: [phone]  Answering service phone number: [phone]    08/16/19  9:48 AM    Dictation performed using Dragon dictation software

## 2019-08-16 NOTE — PLAN OF CARE
Problem: Patient Care Overview  Goal: Plan of Care Review  Outcome: Ongoing (interventions implemented as appropriate)   08/15/19 2353   Coping/Psychosocial   Plan of Care Reviewed With patient   OTHER   Outcome Summary continue to monitor vs, labs, dizziness, pt had wheezing all lobes called Dr. Oakes and Alcides ordered q4prn, npo for stress in am, no c/o dizziness, safety maintained   Plan of Care Review   Progress no change     Goal: Individualization and Mutuality  Outcome: Ongoing (interventions implemented as appropriate)   08/15/19 2353   Individualization   Patient Specific Interventions use bed alarm, encourage turn q2       Problem: Syncope (Adult)  Goal: Physical Safety/Health Maintenance  Outcome: Ongoing (interventions implemented as appropriate)   08/15/19 2353   Syncope (Adult)   Physical Safety/Health Maintenance making progress toward outcome     Goal: Optimal Emotional/Functional [place]  Outcome: Ongoing (interventions implemented as appropriate)   08/15/19 2353   Syncope (Adult)   Optimal Emotional/Functional [place] making progress toward outcome       Problem: Fall Risk (Adult)  Goal: Absence of Fall  Outcome: Ongoing (interventions implemented as appropriate)   08/15/19 2353   Fall Risk (Adult)   Absence of Fall making progress toward outcome

## 2019-08-19 NOTE — PROGRESS NOTES
Case Management Discharge Note    Final Note: Discharged to [facility].      Destination - Selection Complete      Service Provider Request Status Selected Services Address Phone Number Fax Number    [facility] [address] [phone]       Samara Durán RN 8/15/2019 1352    8/15/2019 patient from this facility and has been there for 10 months.                  Durable Medical Equipment      No service has been selected for the patient.      Dialysis/Infusion      No service has been selected for the patient.      Home Medical Care      No service has been selected for the patient.      Therapy      No service has been selected for the patient.      Community Resources      No service has been selected for the patient.        Transportation Services  Private: Car    Final Discharge Disposition Code: 03 - skilled nursing facility (SNF)

## 2019-08-22 PROCEDURE — 93227 XTRNL ECG REC<48 HR R&I: CPT | Performed by: INTERNAL MEDICINE

## 2019-08-26 ENCOUNTER — HOSPITAL ENCOUNTER (OUTPATIENT)
Dept: CARDIOLOGY | Facility: HOSPITAL | Age: 74
Discharge: HOME | End: 2019-08-26
Admitting: INTERNAL MEDICINE

## 2019-08-26 ENCOUNTER — OFFICE VISIT (OUTPATIENT)
Dept: CARDIOLOGY | Facility: CLINIC | Age: 74
End: 2019-08-26

## 2019-08-26 VITALS
HEART RATE: 93 BPM | SYSTOLIC BLOOD PRESSURE: 148 MMHG | WEIGHT: 233 LBS | BODY MASS INDEX: 31.56 KG/M2 | DIASTOLIC BLOOD PRESSURE: 85 MMHG | HEIGHT: 72 IN

## 2019-08-26 DIAGNOSIS — I48.20 CHRONIC ATRIAL FIBRILLATION (HCC): ICD-10-CM

## 2019-08-26 DIAGNOSIS — R55 SYNCOPE, UNSPECIFIED SYNCOPE TYPE: Primary | ICD-10-CM

## 2019-08-26 LAB
ANION GAP SERPL CALCULATED.3IONS-SCNC: 11.2 MMOL/L (ref 5–15)
APTT PPP: 33 SECONDS (ref 22.7–35.4)
BASOPHILS # BLD AUTO: 0.05 10*3/MM3 (ref 0–0.2)
BASOPHILS NFR BLD AUTO: 0.6 % (ref 0–1.5)
BUN BLD-MCNC: 23 MG/DL (ref 8–23)
BUN/CREAT SERPL: 15.5 (ref 7–25)
CALCIUM SPEC-SCNC: 9.1 MG/DL (ref 8.6–10.5)
CHLORIDE SERPL-SCNC: 104 MMOL/L (ref 98–107)
CO2 SERPL-SCNC: 25.8 MMOL/L (ref 22–29)
CREAT BLD-MCNC: 1.48 MG/DL (ref 0.76–1.27)
DEPRECATED RDW RBC AUTO: 57.9 FL (ref 37–54)
EOSINOPHIL # BLD AUTO: 0.12 10*3/MM3 (ref 0–0.4)
EOSINOPHIL NFR BLD AUTO: 1.5 % (ref 0.3–6.2)
ERYTHROCYTE [DISTWIDTH] IN BLOOD BY AUTOMATED COUNT: 18.1 % (ref 12.3–15.4)
GFR SERPL CREATININE-BSD FRML MDRD: 46 ML/MIN/1.73
GLUCOSE BLD-MCNC: 95 MG/DL (ref 65–99)
HCT VFR BLD AUTO: 47.8 % (ref 37.5–51)
HGB BLD-MCNC: 14.6 G/DL (ref 13–17.7)
IMM GRANULOCYTES # BLD AUTO: 0.02 10*3/MM3 (ref 0–0.05)
IMM GRANULOCYTES NFR BLD AUTO: 0.2 % (ref 0–0.5)
INR PPP: 1.06 (ref 0.9–1.1)
LYMPHOCYTES # BLD AUTO: 1.63 10*3/MM3 (ref 0.7–3.1)
LYMPHOCYTES NFR BLD AUTO: 20.3 % (ref 19.6–45.3)
MCH RBC QN AUTO: 27.7 PG (ref 26.6–33)
MCHC RBC AUTO-ENTMCNC: 30.5 G/DL (ref 31.5–35.7)
MCV RBC AUTO: 90.7 FL (ref 79–97)
MONOCYTES # BLD AUTO: 0.83 10*3/MM3 (ref 0.1–0.9)
MONOCYTES NFR BLD AUTO: 10.4 % (ref 5–12)
NEUTROPHILS # BLD AUTO: 5.36 10*3/MM3 (ref 1.7–7)
NEUTROPHILS NFR BLD AUTO: 67 % (ref 42.7–76)
NRBC BLD AUTO-RTO: 0 /100 WBC (ref 0–0.2)
PLATELET # BLD AUTO: 324 10*3/MM3 (ref 140–450)
PMV BLD AUTO: 11.7 FL (ref 6–12)
POTASSIUM BLD-SCNC: 5.2 MMOL/L (ref 3.5–5.2)
PROTHROMBIN TIME: 13.5 SECONDS (ref 11.7–14.2)
RBC # BLD AUTO: 5.27 10*6/MM3 (ref 4.14–5.8)
SODIUM BLD-SCNC: 141 MMOL/L (ref 136–145)
WBC NRBC COR # BLD: 8.01 10*3/MM3 (ref 3.4–10.8)

## 2019-08-26 PROCEDURE — 85025 COMPLETE CBC W/AUTO DIFF WBC: CPT | Performed by: INTERNAL MEDICINE

## 2019-08-26 PROCEDURE — 99213 OFFICE O/P EST LOW 20 MIN: CPT | Performed by: INTERNAL MEDICINE

## 2019-08-26 PROCEDURE — 36415 COLL VENOUS BLD VENIPUNCTURE: CPT | Performed by: INTERNAL MEDICINE

## 2019-08-26 PROCEDURE — 85610 PROTHROMBIN TIME: CPT | Performed by: INTERNAL MEDICINE

## 2019-08-26 PROCEDURE — 80048 BASIC METABOLIC PNL TOTAL CA: CPT | Performed by: INTERNAL MEDICINE

## 2019-08-26 PROCEDURE — 85730 THROMBOPLASTIN TIME PARTIAL: CPT | Performed by: INTERNAL MEDICINE

## 2019-08-26 NOTE — PROGRESS NOTES
Subjective:        Edson Myers is a 74 y.o. male who here for follow up    CC  SSS  HPI  74-year-old male with sick sinus syndrome atrial fibrillation syncope recently had an Holter monitor done highly abnormal with significant pauses severe bradycardia also has symptoms of weakness dizziness and lightheadedness     Problem List Items Addressed This Visit        Cardiovascular and Mediastinum    Syncope - Primary    Relevant Orders    CBC & Differential (Completed)    Basic Metabolic Panel (Completed)    aPTT (Completed)    Protime-INR (Completed)    CBC Auto Differential (Completed)    Chronic atrial fibrillation (CMS/HCC)    Relevant Orders    Case Request Cath Lab: Pacemaker SC new (Completed)    CBC & Differential (Completed)    Basic Metabolic Panel (Completed)    aPTT (Completed)    Protime-INR (Completed)    CBC Auto Differential (Completed)        .Interpretation Summary     · Left ventricular ejection fraction is moderately reduced (Calculated EF = 38%).  · Myocardial perfusion imaging indicates a medium-sized infarct located in the inferior wall and lateral wall with no significant ischemia noted.  · Impressions are consistent with an intermediate risk study.     Interpretation Summary     · Left ventricular systolic function is mildly decreased. Calculated EF = 50.0%. Estimated EF was in disagreement with the calculated EF. Estimated EF appears to be in the range of 41 - 45%. Normal left ventricular cavity size and wall thickness noted.  · The following segments are hypokinetic: basal inferior, basal inferolateral, mid inferior, mid inferolateral, mid anterolateral and apical lateral.  · Left ventricular diastolic dysfunction is noted (grade I) consistent with impaired relaxation.  · The aortic valve is not well visualized. The aortic valve is abnormal in structure. There is moderate calcification of the aortic valve.  · Mild to moderate aortic valve stenosis is present. It may be more severe.  · Mild  "MAC is present         The following portions of the patient's history were reviewed and updated as appropriate: allergies, current medications, past family history, past medical history, past social history, past surgical history and problem list.    Past Medical History:   Diagnosis Date   • Acute kidney injury (CMS/HCC)    • Altered mental status, unspecified    • Anemia    • Arthritis     bilateral hands   • Aspiration pneumonia (CMS/HCC)    • Atrial fibrillation (CMS/HCC)    • Carotid stenosis, bilateral    • Coronary artery disease    • Dementia    • Depression    • Diabetes mellitus (CMS/HCC)    • Dysphagia    • Encephalopathy acute    • GERD (gastroesophageal reflux disease)    • Hyperlipidemia    • Hypertension    • Hypocalcemia    • Left bundle branch block    • Leukocytosis    • LV dysfunction    • Septic shock (CMS/HCC)    • Stroke (CMS/HCC)    • Syncope    • Ulcerative colitis (CMS/HCC)      reports that he has quit smoking. He does not have any smokeless tobacco history on file. Drug use questions deferred to the physician. He reports that he does not drink alcohol. History reviewed. No pertinent family history.    Review of Systems  Constitutional: No wt loss, fever, fatigue  Gastrointestinal: No nausea, abdominal pain  Behavioral/Psych: No insomnia or anxiety   Cardiovascular weakness dizziness and lightheadedness  Objective:       Physical Exam  /85 (BP Location: Left arm, Patient Position: Sitting)   Pulse 93   Ht 182 cm (71.65\")   Wt 106 kg (233 lb)   BMI 31.91 kg/m²   General appearance: No acute changes   Neck: Trachea midline; NECK, supple, no thyromegaly or lymphadenopathy   Lungs: Normal size and shape, normal breath sounds, equal distribution of air, no rales and rhonchi   CV: S1-S2 regular, no murmurs, no rub, no gallop   Abdomen: Soft, non-tender; no masses , no abnormal abdominal sounds   Extremities: No deformity , normal color , no peripheral edema   Skin: Normal temperature, " turgor and texture; no rash, ulcers          Procedures      Echocardiogram:        Current Outpatient Medications:   •  aspirin 81 MG EC tablet, Take 81 mg by mouth Daily., Disp: , Rfl:   •  atorvastatin (LIPITOR) 10 MG tablet, Take 1 tablet by mouth Every Night for 30 days., Disp: 30 tablet, Rfl: 0  •  Calcium Carb-Cholecalciferol (OYSTER SHELL CALCIUM/VITAMIN D) 250-125 MG-UNIT tablet tablet, Take 2 tablets by mouth Daily., Disp: , Rfl:   •  diltiaZEM CD (CARDIZEM CD) 120 MG 24 hr capsule, Take 1 capsule by mouth Daily for 30 days., Disp: 30 capsule, Rfl: 0  •  Divalproex Sodium (DEPAKOTE SPRINKLE) 125 MG capsule, Take 125 mg by mouth 2 (Two) Times a Day., Disp: , Rfl:   •  donepezil (ARICEPT) 10 MG tablet, Take 10 mg by mouth Every Night., Disp: , Rfl:   •  lisinopril (PRINIVIL,ZESTRIL) 20 MG tablet, Take 1 tablet by mouth Daily for 30 days., Disp: 30 tablet, Rfl: 0  •  memantine (NAMENDA XR) 7 MG capsule sustained-release 24 hr extended release capsule, Take 28 mg by mouth Daily., Disp: , Rfl:   •  mirtazapine (REMERON) 15 MG tablet, Take 15 mg by mouth Every Night., Disp: , Rfl:   •  olopatadine (PATADAY) 0.2 % solution ophthalmic solution, 1 drop Every 8 (Eight) Hours., Disp: , Rfl:   •  omeprazole (priLOSEC) 40 MG capsule, Take 40 mg by mouth Daily., Disp: , Rfl:   •  sulfaSALAzine (AZULFIDINE) 500 MG tablet, Take 500 mg by mouth 3 (Three) Times a Day., Disp: , Rfl:    Assessment:        Patient Active Problem List   Diagnosis   • Syncope   • Acute UTI (urinary tract infection)               Plan:            ICD-10-CM ICD-9-CM   1. Syncope, unspecified syncope type R55 780.2   2. Chronic atrial fibrillation (CMS/HCC) I48.2 427.31     1. Chronic atrial fibrillation (CMS/HCC)  We will proceed with a pacemaker implantation    Seizure risk and options have been      - Case Request Cath Lab: Pacemaker SC new  - CBC & Differential  - Basic Metabolic Panel  - aPTT  - Protime-INR  - CBC Auto Differential    2.  Syncope, unspecified syncope type  Due to sick sinus syndrome will need pacemaker  - CBC & Differential  - Basic Metabolic Panel  - aPTT  - Protime-INR  - CBC Auto Differential    COUNSELING:    Edson Escobar was given to patient for the following topics: diagnostic results, risk factor reductions, impressions, risks and benefits of treatment options and importance of treatment compliance .       SMOKING COUNSELING:    Counseling given: Yes      Dictated using Dragon dictation

## 2019-08-30 ENCOUNTER — HOSPITAL ENCOUNTER (OUTPATIENT)
Facility: HOSPITAL | Age: 74
Discharge: NURSING, REHAB OR LONG-TERM CARE | End: 2019-08-31
Attending: INTERNAL MEDICINE | Admitting: INTERNAL MEDICINE

## 2019-08-30 DIAGNOSIS — I48.20 CHRONIC ATRIAL FIBRILLATION (HCC): ICD-10-CM

## 2019-08-30 LAB
ANION GAP SERPL CALCULATED.3IONS-SCNC: 14.8 MMOL/L (ref 5–15)
BUN BLD-MCNC: 24 MG/DL (ref 8–23)
BUN/CREAT SERPL: 11.5 (ref 7–25)
CALCIUM SPEC-SCNC: 8.9 MG/DL (ref 8.6–10.5)
CHLORIDE SERPL-SCNC: 102 MMOL/L (ref 98–107)
CO2 SERPL-SCNC: 22.2 MMOL/L (ref 22–29)
CREAT BLD-MCNC: 2.08 MG/DL (ref 0.76–1.27)
DEPRECATED RDW RBC AUTO: 58.8 FL (ref 37–54)
ERYTHROCYTE [DISTWIDTH] IN BLOOD BY AUTOMATED COUNT: 18 % (ref 12.3–15.4)
GFR SERPL CREATININE-BSD FRML MDRD: 31 ML/MIN/1.73
GLUCOSE BLD-MCNC: 145 MG/DL (ref 65–99)
HCT VFR BLD AUTO: 49.2 % (ref 37.5–51)
HGB BLD-MCNC: 14.8 G/DL (ref 13–17.7)
MCH RBC QN AUTO: 27.8 PG (ref 26.6–33)
MCHC RBC AUTO-ENTMCNC: 30.1 G/DL (ref 31.5–35.7)
MCV RBC AUTO: 92.3 FL (ref 79–97)
PLATELET # BLD AUTO: 345 10*3/MM3 (ref 140–450)
PMV BLD AUTO: 10.9 FL (ref 6–12)
POTASSIUM BLD-SCNC: 4.3 MMOL/L (ref 3.5–5.2)
RBC # BLD AUTO: 5.33 10*6/MM3 (ref 4.14–5.8)
SODIUM BLD-SCNC: 139 MMOL/L (ref 136–145)
WBC NRBC COR # BLD: 10.96 10*3/MM3 (ref 3.4–10.8)

## 2019-08-30 PROCEDURE — 25010000002 MIDAZOLAM PER 1 MG: Performed by: INTERNAL MEDICINE

## 2019-08-30 PROCEDURE — A9270 NON-COVERED ITEM OR SERVICE: HCPCS | Performed by: INTERNAL MEDICINE

## 2019-08-30 PROCEDURE — 25010000003 CEFAZOLIN IN DEXTROSE 2-4 GM/100ML-% SOLUTION: Performed by: INTERNAL MEDICINE

## 2019-08-30 PROCEDURE — 80048 BASIC METABOLIC PNL TOTAL CA: CPT | Performed by: INTERNAL MEDICINE

## 2019-08-30 PROCEDURE — 33208 INSRT HEART PM ATRIAL & VENT: CPT | Performed by: INTERNAL MEDICINE

## 2019-08-30 PROCEDURE — 63710000001 SULFASALAZINE 500 MG TABLET: Performed by: INTERNAL MEDICINE

## 2019-08-30 PROCEDURE — 0 IOPAMIDOL PER 1 ML: Performed by: INTERNAL MEDICINE

## 2019-08-30 PROCEDURE — C1894 INTRO/SHEATH, NON-LASER: HCPCS | Performed by: INTERNAL MEDICINE

## 2019-08-30 PROCEDURE — C1785 PMKR, DUAL, RATE-RESP: HCPCS | Performed by: INTERNAL MEDICINE

## 2019-08-30 PROCEDURE — 25010000003 LIDOCAINE 1 % SOLUTION: Performed by: INTERNAL MEDICINE

## 2019-08-30 PROCEDURE — 63710000001 MIRTAZAPINE 15 MG TABLET: Performed by: INTERNAL MEDICINE

## 2019-08-30 PROCEDURE — 25010000002 VANCOMYCIN 10 G RECONSTITUTED SOLUTION: Performed by: INTERNAL MEDICINE

## 2019-08-30 PROCEDURE — 63710000001 LISINOPRIL 20 MG TABLET: Performed by: INTERNAL MEDICINE

## 2019-08-30 PROCEDURE — 99153 MOD SED SAME PHYS/QHP EA: CPT | Performed by: INTERNAL MEDICINE

## 2019-08-30 PROCEDURE — 63710000001 ATORVASTATIN 10 MG TABLET: Performed by: INTERNAL MEDICINE

## 2019-08-30 PROCEDURE — C1898 LEAD, PMKR, OTHER THAN TRANS: HCPCS | Performed by: INTERNAL MEDICINE

## 2019-08-30 PROCEDURE — 25010000002 VANCOMYCIN PER 500 MG: Performed by: INTERNAL MEDICINE

## 2019-08-30 PROCEDURE — 63710000001 ASPIRIN 81 MG TABLET DELAYED-RELEASE: Performed by: INTERNAL MEDICINE

## 2019-08-30 PROCEDURE — 99152 MOD SED SAME PHYS/QHP 5/>YRS: CPT | Performed by: INTERNAL MEDICINE

## 2019-08-30 PROCEDURE — 25010000002 FENTANYL CITRATE (PF) 100 MCG/2ML SOLUTION: Performed by: INTERNAL MEDICINE

## 2019-08-30 PROCEDURE — 63710000001 DILTIAZEM CD 120 MG CAPSULE SUSTAINED-RELEASE 24 HR: Performed by: INTERNAL MEDICINE

## 2019-08-30 PROCEDURE — G0378 HOSPITAL OBSERVATION PER HR: HCPCS

## 2019-08-30 PROCEDURE — 63710000001 DONEPEZIL 10 MG TABLET: Performed by: INTERNAL MEDICINE

## 2019-08-30 PROCEDURE — 85027 COMPLETE CBC AUTOMATED: CPT | Performed by: INTERNAL MEDICINE

## 2019-08-30 DEVICE — PACEMAKER
Type: IMPLANTABLE DEVICE | Status: FUNCTIONAL
Brand: ACCOLADE™ MRI DR

## 2019-08-30 DEVICE — IMPLANTABLE DEVICE: Type: IMPLANTABLE DEVICE | Status: FUNCTIONAL

## 2019-08-30 RX ORDER — LIDOCAINE HYDROCHLORIDE 10 MG/ML
INJECTION, SOLUTION INFILTRATION; PERINEURAL AS NEEDED
Status: DISCONTINUED | OUTPATIENT
Start: 2019-08-30 | End: 2019-08-30 | Stop reason: HOSPADM

## 2019-08-30 RX ORDER — MIDAZOLAM HYDROCHLORIDE 1 MG/ML
INJECTION INTRAMUSCULAR; INTRAVENOUS AS NEEDED
Status: DISCONTINUED | OUTPATIENT
Start: 2019-08-30 | End: 2019-08-30 | Stop reason: HOSPADM

## 2019-08-30 RX ORDER — DILTIAZEM HYDROCHLORIDE 120 MG/1
120 CAPSULE, COATED, EXTENDED RELEASE ORAL
Status: DISCONTINUED | OUTPATIENT
Start: 2019-08-30 | End: 2019-08-31 | Stop reason: HOSPADM

## 2019-08-30 RX ORDER — MIRTAZAPINE 15 MG/1
15 TABLET, FILM COATED ORAL NIGHTLY
Status: DISCONTINUED | OUTPATIENT
Start: 2019-08-30 | End: 2019-08-31 | Stop reason: HOSPADM

## 2019-08-30 RX ORDER — FENTANYL CITRATE 50 UG/ML
INJECTION, SOLUTION INTRAMUSCULAR; INTRAVENOUS AS NEEDED
Status: DISCONTINUED | OUTPATIENT
Start: 2019-08-30 | End: 2019-08-30 | Stop reason: HOSPADM

## 2019-08-30 RX ORDER — PANTOPRAZOLE SODIUM 40 MG/1
40 TABLET, DELAYED RELEASE ORAL
Status: DISCONTINUED | OUTPATIENT
Start: 2019-08-31 | End: 2019-08-31 | Stop reason: HOSPADM

## 2019-08-30 RX ORDER — LIDOCAINE HYDROCHLORIDE 10 MG/ML
0.1 INJECTION, SOLUTION EPIDURAL; INFILTRATION; INTRACAUDAL; PERINEURAL ONCE AS NEEDED
Status: DISCONTINUED | OUTPATIENT
Start: 2019-08-30 | End: 2019-08-30 | Stop reason: HOSPADM

## 2019-08-30 RX ORDER — LISINOPRIL 20 MG/1
20 TABLET ORAL
Status: DISCONTINUED | OUTPATIENT
Start: 2019-08-30 | End: 2019-08-31 | Stop reason: HOSPADM

## 2019-08-30 RX ORDER — SODIUM CHLORIDE 0.9 % (FLUSH) 0.9 %
10 SYRINGE (ML) INJECTION AS NEEDED
Status: DISCONTINUED | OUTPATIENT
Start: 2019-08-30 | End: 2019-08-30 | Stop reason: HOSPADM

## 2019-08-30 RX ORDER — CEFAZOLIN SODIUM 2 G/100ML
INJECTION, SOLUTION INTRAVENOUS CONTINUOUS PRN
Status: DISCONTINUED | OUTPATIENT
Start: 2019-08-30 | End: 2019-08-30 | Stop reason: HOSPADM

## 2019-08-30 RX ORDER — ATORVASTATIN CALCIUM 10 MG/1
10 TABLET, FILM COATED ORAL NIGHTLY
Status: DISCONTINUED | OUTPATIENT
Start: 2019-08-30 | End: 2019-08-31 | Stop reason: HOSPADM

## 2019-08-30 RX ORDER — SULFASALAZINE 500 MG/1
500 TABLET ORAL 3 TIMES DAILY
Status: DISCONTINUED | OUTPATIENT
Start: 2019-08-30 | End: 2019-08-31 | Stop reason: HOSPADM

## 2019-08-30 RX ORDER — SODIUM CHLORIDE 0.9 % (FLUSH) 0.9 %
3 SYRINGE (ML) INJECTION EVERY 12 HOURS SCHEDULED
Status: DISCONTINUED | OUTPATIENT
Start: 2019-08-30 | End: 2019-08-31 | Stop reason: HOSPADM

## 2019-08-30 RX ORDER — SODIUM CHLORIDE 0.9 % (FLUSH) 0.9 %
10 SYRINGE (ML) INJECTION AS NEEDED
Status: DISCONTINUED | OUTPATIENT
Start: 2019-08-30 | End: 2019-08-31 | Stop reason: HOSPADM

## 2019-08-30 RX ORDER — SODIUM CHLORIDE 0.9 % (FLUSH) 0.9 %
3 SYRINGE (ML) INJECTION EVERY 12 HOURS SCHEDULED
Status: DISCONTINUED | OUTPATIENT
Start: 2019-08-30 | End: 2019-08-30 | Stop reason: HOSPADM

## 2019-08-30 RX ORDER — ASPIRIN 81 MG/1
81 TABLET ORAL DAILY
Status: DISCONTINUED | OUTPATIENT
Start: 2019-08-30 | End: 2019-08-31 | Stop reason: HOSPADM

## 2019-08-30 RX ORDER — DONEPEZIL HYDROCHLORIDE 10 MG/1
10 TABLET, FILM COATED ORAL NIGHTLY
Status: DISCONTINUED | OUTPATIENT
Start: 2019-08-30 | End: 2019-08-31 | Stop reason: HOSPADM

## 2019-08-30 RX ORDER — VANCOMYCIN HYDROCHLORIDE 1 G/200ML
INJECTION, SOLUTION INTRAVENOUS CONTINUOUS PRN
Status: COMPLETED | OUTPATIENT
Start: 2019-08-30 | End: 2019-08-30

## 2019-08-30 RX ORDER — SODIUM CHLORIDE 9 MG/ML
75 INJECTION, SOLUTION INTRAVENOUS CONTINUOUS
Status: DISCONTINUED | OUTPATIENT
Start: 2019-08-30 | End: 2019-08-31 | Stop reason: HOSPADM

## 2019-08-30 RX ADMIN — SODIUM CHLORIDE 75 ML/HR: 9 INJECTION, SOLUTION INTRAVENOUS at 16:22

## 2019-08-30 RX ADMIN — DILTIAZEM HYDROCHLORIDE 120 MG: 120 CAPSULE, COATED, EXTENDED RELEASE ORAL at 14:07

## 2019-08-30 RX ADMIN — MIRTAZAPINE 15 MG: 15 TABLET, FILM COATED ORAL at 20:01

## 2019-08-30 RX ADMIN — DONEPEZIL HYDROCHLORIDE 10 MG: 10 TABLET, FILM COATED ORAL at 20:01

## 2019-08-30 RX ADMIN — SODIUM CHLORIDE 75 ML/HR: 9 INJECTION, SOLUTION INTRAVENOUS at 14:10

## 2019-08-30 RX ADMIN — ASPIRIN 81 MG: 81 TABLET, COATED ORAL at 14:06

## 2019-08-30 RX ADMIN — LISINOPRIL 20 MG: 20 TABLET ORAL at 14:07

## 2019-08-30 RX ADMIN — SODIUM CHLORIDE, PRESERVATIVE FREE 3 ML: 5 INJECTION INTRAVENOUS at 14:08

## 2019-08-30 RX ADMIN — VANCOMYCIN HYDROCHLORIDE 1500 MG: 10 INJECTION, POWDER, LYOPHILIZED, FOR SOLUTION INTRAVENOUS at 22:57

## 2019-08-30 RX ADMIN — SULFASALAZINE 500 MG: 500 TABLET ORAL at 14:10

## 2019-08-30 RX ADMIN — ATORVASTATIN CALCIUM 10 MG: 10 TABLET, FILM COATED ORAL at 20:01

## 2019-08-30 RX ADMIN — SODIUM CHLORIDE, PRESERVATIVE FREE 3 ML: 5 INJECTION INTRAVENOUS at 20:01

## 2019-08-30 RX ADMIN — SULFASALAZINE 500 MG: 500 TABLET ORAL at 16:13

## 2019-08-30 RX ADMIN — SODIUM CHLORIDE 75 ML/HR: 9 INJECTION, SOLUTION INTRAVENOUS at 08:17

## 2019-08-31 ENCOUNTER — APPOINTMENT (OUTPATIENT)
Dept: GENERAL RADIOLOGY | Facility: HOSPITAL | Age: 74
End: 2019-08-31

## 2019-08-31 VITALS
WEIGHT: 235 LBS | DIASTOLIC BLOOD PRESSURE: 96 MMHG | HEIGHT: 72 IN | TEMPERATURE: 98.1 F | HEART RATE: 78 BPM | BODY MASS INDEX: 31.83 KG/M2 | OXYGEN SATURATION: 96 % | SYSTOLIC BLOOD PRESSURE: 148 MMHG | RESPIRATION RATE: 18 BRPM

## 2019-08-31 PROBLEM — R00.1 SYMPTOMATIC BRADYCARDIA: Status: ACTIVE | Noted: 2019-08-31

## 2019-08-31 PROBLEM — N39.0 ACUTE UTI (URINARY TRACT INFECTION): Status: RESOLVED | Noted: 2019-08-14 | Resolved: 2019-08-31

## 2019-08-31 LAB
INR PPP: 1.17 (ref 0.9–1.1)
PROTHROMBIN TIME: 14.6 SECONDS (ref 11.7–14.2)

## 2019-08-31 PROCEDURE — A9270 NON-COVERED ITEM OR SERVICE: HCPCS | Performed by: INTERNAL MEDICINE

## 2019-08-31 PROCEDURE — 63710000001 SULFASALAZINE 500 MG TABLET: Performed by: INTERNAL MEDICINE

## 2019-08-31 PROCEDURE — 93010 ELECTROCARDIOGRAM REPORT: CPT | Performed by: INTERNAL MEDICINE

## 2019-08-31 PROCEDURE — 71046 X-RAY EXAM CHEST 2 VIEWS: CPT

## 2019-08-31 PROCEDURE — 93005 ELECTROCARDIOGRAM TRACING: CPT | Performed by: INTERNAL MEDICINE

## 2019-08-31 PROCEDURE — 99217 PR OBSERVATION CARE DISCHARGE MANAGEMENT: CPT | Performed by: INTERNAL MEDICINE

## 2019-08-31 PROCEDURE — G0378 HOSPITAL OBSERVATION PER HR: HCPCS

## 2019-08-31 PROCEDURE — 63710000001 ASPIRIN 81 MG TABLET DELAYED-RELEASE: Performed by: INTERNAL MEDICINE

## 2019-08-31 PROCEDURE — 85610 PROTHROMBIN TIME: CPT | Performed by: INTERNAL MEDICINE

## 2019-08-31 PROCEDURE — 63710000001 DILTIAZEM CD 120 MG CAPSULE SUSTAINED-RELEASE 24 HR: Performed by: INTERNAL MEDICINE

## 2019-08-31 PROCEDURE — 63710000001 LISINOPRIL 20 MG TABLET: Performed by: INTERNAL MEDICINE

## 2019-08-31 RX ADMIN — ASPIRIN 81 MG: 81 TABLET, COATED ORAL at 08:59

## 2019-08-31 RX ADMIN — SODIUM CHLORIDE 75 ML/HR: 9 INJECTION, SOLUTION INTRAVENOUS at 10:45

## 2019-08-31 RX ADMIN — LISINOPRIL 20 MG: 20 TABLET ORAL at 08:59

## 2019-08-31 RX ADMIN — DILTIAZEM HYDROCHLORIDE 120 MG: 120 CAPSULE, COATED, EXTENDED RELEASE ORAL at 08:59

## 2019-08-31 RX ADMIN — SULFASALAZINE 500 MG: 500 TABLET ORAL at 08:59

## 2019-08-31 RX ADMIN — SODIUM CHLORIDE, PRESERVATIVE FREE 3 ML: 5 INJECTION INTRAVENOUS at 08:59

## 2019-08-31 RX ADMIN — SULFASALAZINE 500 MG: 500 TABLET ORAL at 16:10

## 2019-09-02 NOTE — PROGRESS NOTES
Case Management Discharge Note    Final Note: pt return to [facility] level bed    Destination - Selection Complete      Service Provider Request Status Selected Services Address Phone Number Fax Number    IDALMIS Krishnan [facility] [address] [place] [address] [phone]      Durable Medical Equipment      No service has been selected for the patient.      Dialysis/Infusion      No service has been selected for the patient.      Home Medical Care      No service has been selected for the patient.      Therapy      No service has been selected for the patient.      Community Resources      No service has been selected for the patient.        Transportation Services  Ambulance: (transportation set up by staff on 4N)    Final Discharge Disposition Code: 04 - intermediate care facility

## 2019-09-12 ENCOUNTER — CLINICAL SUPPORT NO REQUIREMENTS (OUTPATIENT)
Dept: CARDIOLOGY | Facility: CLINIC | Age: 74
End: 2019-09-12

## 2019-09-12 DIAGNOSIS — Z45.018 PACEMAKER REPROGRAMMING/CHECK: Primary | ICD-10-CM

## 2019-09-12 PROCEDURE — 93280 PM DEVICE PROGR EVAL DUAL: CPT | Performed by: INTERNAL MEDICINE

## 2019-09-13 PROBLEM — Z95.0 PACEMAKER: Status: ACTIVE | Noted: 2019-09-13

## 2019-09-20 ENCOUNTER — OFFICE VISIT (OUTPATIENT)
Dept: CARDIOLOGY | Facility: CLINIC | Age: 74
End: 2019-09-20

## 2019-09-20 VITALS
DIASTOLIC BLOOD PRESSURE: 86 MMHG | WEIGHT: 238 LBS | HEART RATE: 81 BPM | SYSTOLIC BLOOD PRESSURE: 133 MMHG | BODY MASS INDEX: 32.23 KG/M2 | HEIGHT: 72 IN

## 2019-09-20 DIAGNOSIS — I25.10 CORONARY ARTERY DISEASE INVOLVING NATIVE HEART WITHOUT ANGINA PECTORIS, UNSPECIFIED VESSEL OR LESION TYPE: ICD-10-CM

## 2019-09-20 DIAGNOSIS — R00.1 SYMPTOMATIC BRADYCARDIA: ICD-10-CM

## 2019-09-20 DIAGNOSIS — I10 ESSENTIAL HYPERTENSION: ICD-10-CM

## 2019-09-20 DIAGNOSIS — E78.2 MIXED HYPERLIPIDEMIA: ICD-10-CM

## 2019-09-20 DIAGNOSIS — I48.20 CHRONIC ATRIAL FIBRILLATION (HCC): Primary | ICD-10-CM

## 2019-09-20 PROCEDURE — 99213 OFFICE O/P EST LOW 20 MIN: CPT | Performed by: NURSE PRACTITIONER

## 2019-09-20 RX ORDER — ATORVASTATIN CALCIUM 10 MG/1
10 TABLET, FILM COATED ORAL DAILY
COMMUNITY

## 2019-09-20 RX ORDER — IPRATROPIUM BROMIDE AND ALBUTEROL SULFATE 2.5; .5 MG/3ML; MG/3ML
3 SOLUTION RESPIRATORY (INHALATION) EVERY 4 HOURS PRN
COMMUNITY
End: 2021-03-18

## 2019-09-20 RX ORDER — LISINOPRIL 20 MG/1
20 TABLET ORAL DAILY
COMMUNITY
End: 2021-03-18

## 2019-09-20 RX ORDER — DILTIAZEM HYDROCHLORIDE 180 MG/1
180 CAPSULE, COATED, EXTENDED RELEASE ORAL DAILY
COMMUNITY
End: 2023-03-07 | Stop reason: ALTCHOICE

## 2019-09-20 NOTE — PROGRESS NOTES
Patient Name: Edson Myers  :1945  Age: 74 y.o.  Primary Cardiologist: Amaya Rizzo MD  Encounter Provider:  ANTON Kevin      Chief Complaint:   Chief Complaint   Patient presents with   • Slow Heart Rate         HPI this is a 74-year-old male, known to this provider, who comes today in follow-up regarding recent placement of permanent pacemaker.  History to include chronic atrial fibrillation, symptomatic bradycardia, dementia, LV dysfunction, altered mental status, anemia, carotid artery stenosis-bilateral, CAD, DM 2, hyperlipidemia, hypertension, LBBB, and stroke.  Last echo done on 2019 revealed EF 50%, grade 1 LV diastolic dysfunction, moderate calcification of aortic valve but valve not well visualized, mild to moderate aortic valve stenosis but may be more severe, mild MAC, and the following segments hypokinetic: Basal inferior, basal inferolateral, mid inferior, mid inferolateral, mid anterolateral and apical lateral.  Stress testing done on 2019 revealed no evidence of significant ischemia.  Dual-chamber pacemaker was placed on 2019.  Device check on 2019 revealed no ventricular episodes and normal function.  Lipid panel on  revealed total cholesterol 127 triglycerides 152 HDL 29 and LDL 68.  AST/ALT within normal limits at that time.  The patient is currently doing well in skilled nursing facility.  Pacemaker insertion site healing well.    Patient Active Problem List   Diagnosis   • Chronic atrial fibrillation (CMS/HCC)   • Symptomatic bradycardia   • Dementia   • LV dysfunction   • Pacemaker           The following portions of the patient's history were reviewed and updated as appropriate: allergies, current medications, past family history, past medical history, past social history, past surgical history and problem list.    Current Outpatient Medications on File Prior to Visit   Medication Sig Dispense Refill   •  aspirin 81 MG EC tablet Take 81 mg by mouth Daily.     • atorvastatin (LIPITOR) 10 MG tablet Take 10 mg by mouth Daily.     • Calcium Carb-Cholecalciferol (OYSTER SHELL CALCIUM/VITAMIN D) 250-125 MG-UNIT tablet tablet Take 2 tablets by mouth Daily.     • diltiaZEM CD (CARDIZEM CD) 120 MG 24 hr capsule Take 120 mg by mouth Daily.     • Divalproex Sodium (DEPAKOTE SPRINKLE) 125 MG capsule Take 125 mg by mouth 2 (Two) Times a Day.     • donepezil (ARICEPT) 10 MG tablet Take 10 mg by mouth Every Night.     • ipratropium-albuterol (DUO-NEB) 0.5-2.5 mg/3 ml nebulizer Take 3 mL by nebulization Every 4 (Four) Hours As Needed for Wheezing.     • lisinopril (PRINIVIL,ZESTRIL) 20 MG tablet Take 20 mg by mouth Daily.     • memantine (NAMENDA XR) 7 MG capsule sustained-release 24 hr extended release capsule Take 28 mg by mouth Daily.     • mirtazapine (REMERON) 15 MG tablet Take 15 mg by mouth Every Night.     • olopatadine (PATADAY) 0.2 % solution ophthalmic solution 1 drop Every 8 (Eight) Hours.     • omeprazole (priLOSEC) 40 MG capsule Take 40 mg by mouth Daily.     • sulfaSALAzine (AZULFIDINE) 500 MG tablet Take 500 mg by mouth 3 (Three) Times a Day.       No current facility-administered medications on file prior to visit.        Past Medical History:   Diagnosis Date   • Acute kidney injury (CMS/HCC)    • Altered mental status, unspecified    • Anemia    • Arthritis     bilateral hands   • Aspiration pneumonia (CMS/HCC)    • Carotid stenosis, bilateral    • Chronic atrial fibrillation (CMS/HCC)    • Coronary artery disease    • Dementia    • Depression    • Diabetes mellitus (CMS/HCC)    • Dysphagia    • Encephalopathy acute    • GERD (gastroesophageal reflux disease)    • Hyperlipidemia    • Hypertension    • Hypocalcemia    • Left bundle branch block    • LV dysfunction     mild, EF 40-45%   • Septic shock (CMS/HCC)    • Stroke (CMS/HCC)    • Syncope    • Ulcerative colitis (CMS/HCC)        Past Surgical History:  "  Procedure Laterality Date   • CARDIAC ELECTROPHYSIOLOGY PROCEDURE N/A 2019    Procedure: Pacemaker SC new  [place];  Surgeon: Amaya Rizzo MD;  Location: [facility] INVASIVE LOCATION;  Service: Cardiology   • CARDIAC SURGERY     • GTUBE INSERTION     • KNEE SURGERY Left 1960       History reviewed. No pertinent family history.    Social History     Socioeconomic History   • Marital status: Single     Spouse name: Not on file   • Number of children: Not on file   • Years of education: Not on file   • Highest education level: Not on file   Tobacco Use   • Smoking status: Former Smoker     Packs/day: 3.00     Years: 40.00     Pack years: 120.00     Types: Cigarettes   • Smokeless tobacco: Never Used   Substance and Sexual Activity   • Alcohol use: No   • Drug use: Defer   • Sexual activity: Defer       ROS    OBJECTIVE:   Vital Signs  Vitals:    19 1217   BP: 133/86   Pulse: 81     Estimated body mass index is 32.28 kg/m² as calculated from the following:    Height as of this encounter: 182.9 cm (72\").    Weight as of this encounter: 108 kg (238 lb).    Physical Exam    Procedures    Stress Testin19  Interpretation Summary     · Left ventricular ejection fraction is moderately reduced (Calculated EF = 38%).  · Myocardial perfusion imaging indicates a medium-sized infarct located in the inferior wall and lateral wall with no significant ischemia noted.  · Impressions are consistent with an intermediate risk study.         Cardiac Echo:  19  Interpretation Summary     · Left ventricular systolic function is mildly decreased. Calculated EF = 50.0%. Estimated EF was in disagreement with the calculated EF. Estimated EF appears to be in the range of 41 - 45%. Normal left ventricular cavity size and wall thickness noted.  · The following segments are hypokinetic: basal inferior, basal inferolateral, mid inferior, mid inferolateral, mid anterolateral and apical lateral.  · Left ventricular " diastolic dysfunction is noted (grade I) consistent with impaired relaxation.  · The aortic valve is not well visualized. The aortic valve is abnormal in structure. There is moderate calcification of the aortic valve.  · Mild to moderate aortic valve stenosis is present. It may be more severe.  · Mild MAC is present.           ASSESSMENT:      Diagnosis Plan   1. Chronic atrial fibrillation (CMS/HCC)     2. Symptomatic bradycardia     3. Mixed hyperlipidemia     4. Essential hypertension     5. Coronary artery disease involving native heart without angina pectoris, unspecified vessel or lesion type           PLAN OF CARE:     1.  CAD-last ischemic work-up and echo as above.  The patient is currently asymptomatic with no chest pain or shortness of breath.    2.  Symptomatic bradycardia-now status post implanted dual-chamber pacemaker.  Pacemaker insertion site today is free of drainage, edema, erythema, warmth, and ecchymosis.  Last device check as dictated above.  Patient due for next device check December 19, 2019    3.  Hypertension-blood pressure in office today is currently controlled.  Last echo and ischemic work-up as above.    Importance of controlling hypertension and blood pressure  checkup on the regular basis has been explained  Hypertension as a silent killer has been discussed  Risk reduction of the weight and regular exercises to control the hypertension has been explained    4.  Hyperlipidemia-last lipid panel as above.  On atorvastatin, currently controlled.  Repeat fasting lipid panel and LFTs in 6 months.    Risk of the hyperlipidemia, importance of the treatment has been explained  Pros and cons of the statins has been explained  Regular blood workup as well as side effects including the liver failure, myelopathy death has been explained     5.  Chronic atrial fibrillation- on diltiazem.  Not anticoagulated due to age, mobility, and frailty.  Currently on aspirin, continue.  Last echo and stress  test as above.    Follow-up in 3 months or sooner if needed, continue current medication regimen.  Care of pacemaker insertion site reviewed with patient, healing well.    Sincerely,   ANTON Kevin  [facility]  09/20/19  12:41 PM

## 2019-12-10 ENCOUNTER — OFFICE VISIT (OUTPATIENT)
Dept: CARDIOLOGY | Facility: CLINIC | Age: 74
End: 2019-12-10

## 2019-12-10 VITALS
HEIGHT: 72 IN | DIASTOLIC BLOOD PRESSURE: 74 MMHG | SYSTOLIC BLOOD PRESSURE: 122 MMHG | HEART RATE: 96 BPM | WEIGHT: 238 LBS | BODY MASS INDEX: 32.23 KG/M2

## 2019-12-10 DIAGNOSIS — Z95.0 PACEMAKER: ICD-10-CM

## 2019-12-10 DIAGNOSIS — I48.20 CHRONIC ATRIAL FIBRILLATION (HCC): ICD-10-CM

## 2019-12-10 DIAGNOSIS — R94.31 ABNORMAL ECG: Primary | ICD-10-CM

## 2019-12-10 DIAGNOSIS — I10 ESSENTIAL HYPERTENSION: ICD-10-CM

## 2019-12-10 DIAGNOSIS — I51.9 LV DYSFUNCTION: ICD-10-CM

## 2019-12-10 DIAGNOSIS — R00.1 SYMPTOMATIC BRADYCARDIA: ICD-10-CM

## 2019-12-10 PROCEDURE — 99213 OFFICE O/P EST LOW 20 MIN: CPT | Performed by: NURSE PRACTITIONER

## 2019-12-10 PROCEDURE — 93000 ELECTROCARDIOGRAM COMPLETE: CPT | Performed by: NURSE PRACTITIONER

## 2019-12-10 NOTE — PROGRESS NOTES
Patient Name: Edson Myers  :1945  Age: 74 y.o.  Primary Cardiologist: Amaya Rizzo MD  Encounter Provider:  ANTON Kevin      Chief Complaint:   Chief Complaint   Patient presents with   • Atrial Fibrillation     3 month f/u         HPI this is a 74-year-old male, known to this provider, who comes today in follow-up with current diagnoses to include chronic atrial fibrillation, symptomatic bradycardia, dementia, LV dysfunction, altered mental status, anemia, carotid artery stenosis-bilateral, CAD, DM 2, hyperlipidemia, hypertension, LBBB, and stroke.  He underwent placement of dual-chamber PPM on 2019, left subclavian PPM site healed well.  Last echo done on 2019 revealed EF 50%, grade 1 LV diastolic dysfunction, moderate calcification of aortic valve but valve not well visualized, mild to moderate aortic valve stenosis but may be more severe, mild MAC, and the following segments hypokinetic: Basal inferior, basal inferolateral, mid inferior, mid inferolateral, mid anterolateral and apical lateral.  Stress testing done on 2019 revealed no evidence of significant ischemia.  Dual-chamber pacemaker was placed on 2019.  Device check on 2019 revealed no ventricular episodes and normal function.  Lipid panel on  revealed total cholesterol 127 triglycerides 152 HDL 29 and LDL 68.  AST/ALT within normal limits at that time.  He continues to heal well in skilled nursing facility, and currently has no complaints of chest pain, shortness of breath, syncope, or near syncope.  ECG in office today reveals sinus rhythm with PVCs and previously noted LBBB with rate 96-similar to prior tracing.  He does complain of recent seasonal allergy issues to include watering eyes and scratchy throat.      Patient Active Problem List   Diagnosis   • Chronic atrial fibrillation   • Symptomatic bradycardia   • Dementia (CMS/HCC)   • LV dysfunction    • Pacemaker   • Essential hypertension           The following portions of the patient's history were reviewed and updated as appropriate: allergies, current medications, past family history, past medical history, past social history, past surgical history and problem list.    Current Outpatient Medications on File Prior to Visit   Medication Sig Dispense Refill   • aspirin 81 MG EC tablet Take 81 mg by mouth Daily.     • atorvastatin (LIPITOR) 10 MG tablet Take 10 mg by mouth Daily.     • Calcium Carb-Cholecalciferol (OYSTER SHELL CALCIUM/VITAMIN D) 250-125 MG-UNIT tablet tablet Take 2 tablets by mouth Daily.     • dilTIAZem CD (CARDIZEM CD) 180 MG 24 hr capsule Take 180 mg by mouth Daily.     • Divalproex Sodium (DEPAKOTE SPRINKLE) 125 MG capsule Take 125 mg by mouth 2 (Two) Times a Day.     • donepezil (ARICEPT) 10 MG tablet Take 10 mg by mouth Every Night.     • ipratropium-albuterol (DUO-NEB) 0.5-2.5 mg/3 ml nebulizer Take 3 mL by nebulization Every 4 (Four) Hours As Needed for Wheezing.     • lisinopril (PRINIVIL,ZESTRIL) 20 MG tablet Take 20 mg by mouth Daily.     • memantine (NAMENDA XR) 7 MG capsule sustained-release 24 hr extended release capsule Take 28 mg by mouth Daily.     • mirtazapine (REMERON) 15 MG tablet Take 15 mg by mouth Every Night.     • olopatadine (PATADAY) 0.2 % solution ophthalmic solution 1 drop Every 8 (Eight) Hours.     • omeprazole (priLOSEC) 40 MG capsule Take 40 mg by mouth Daily.     • sulfaSALAzine (AZULFIDINE) 500 MG tablet Take 500 mg by mouth 3 (Three) Times a Day.       No current facility-administered medications on file prior to visit.        Past Medical History:   Diagnosis Date   • Acute kidney injury (CMS/HCC)    • Altered mental status, unspecified    • Anemia    • Arthritis     bilateral hands   • Aspiration pneumonia (CMS/HCC)    • Carotid stenosis, bilateral    • Chronic atrial fibrillation    • Coronary artery disease    • Dementia (CMS/HCC)    • Depression    •  Diabetes mellitus (CMS/HCC)    • Dysphagia    • Encephalopathy acute    • GERD (gastroesophageal reflux disease)    • Hyperlipidemia    • Hypertension    • Hypocalcemia    • Left bundle branch block    • LV dysfunction     mild, EF 40-45%   • Septic shock (CMS/HCC)    • Stroke (CMS/HCC)    • Syncope    • Ulcerative colitis (CMS/HCC)        Past Surgical History:   Procedure Laterality Date   • CARDIAC ELECTROPHYSIOLOGY PROCEDURE N/A 8/30/2019    Procedure: Pacemaker SC new  [place];  Surgeon: Amaya Rizzo MD;  Location: [facility] INVASIVE LOCATION;  Service: Cardiology   • CARDIAC SURGERY     • GTUBE INSERTION     • KNEE SURGERY Left 1960       History reviewed. No pertinent family history.    Social History     Socioeconomic History   • Marital status: Single     Spouse name: Not on file   • Number of children: Not on file   • Years of education: Not on file   • Highest education level: Not on file   Tobacco Use   • Smoking status: Former Smoker     Packs/day: 3.00     Years: 40.00     Pack years: 120.00     Types: Cigarettes   • Smokeless tobacco: Never Used   Substance and Sexual Activity   • Alcohol use: No   • Drug use: Defer   • Sexual activity: Defer       Review of Systems   Constitution: Negative for diaphoresis and malaise/fatigue.   HENT: Positive for sore throat. Negative for nosebleeds and stridor.    Cardiovascular: Negative for chest pain, claudication, dyspnea on exertion, irregular heartbeat, leg swelling, near-syncope, orthopnea, palpitations, paroxysmal nocturnal dyspnea and syncope.   Respiratory: Negative for cough, hemoptysis, shortness of breath and wheezing.    Gastrointestinal: Negative for abdominal pain, constipation, diarrhea, hematemesis, hematochezia, melena, nausea and vomiting.   Neurological: Negative for dizziness, focal weakness, light-headedness and weakness.       OBJECTIVE:   Vital Signs  Vitals:    12/10/19 1156   BP: 122/74   Pulse: 96     Estimated body mass index  "is 32.28 kg/m² as calculated from the following:    Height as of this encounter: 182.9 cm (72\").    Weight as of this encounter: 108 kg (238 lb).    Physical Exam   Constitutional: He is oriented to person, place, and time. He appears well-developed and well-nourished. No distress.   HENT:   Head: Normocephalic and atraumatic.   Eyes: Pupils are equal, round, and reactive to light. Conjunctivae and EOM are normal.   Neck: Normal range of motion. Neck supple. No JVD present. No tracheal deviation present. No thyromegaly present.   Cardiovascular: Normal rate, regular rhythm and intact distal pulses. Exam reveals no gallop and no friction rub.   Murmur heard.  Systolic LSB   Pulmonary/Chest: Effort normal and breath sounds normal. No respiratory distress. He has no wheezes. He has no rales. He exhibits no tenderness.   Abdominal: Soft. Bowel sounds are normal. He exhibits no distension. There is no tenderness.   Musculoskeletal: Normal range of motion. He exhibits no edema, tenderness or deformity.   Neurological: He is alert and oriented to person, place, and time.   Skin: Skin is warm and dry. No rash noted. He is not diaphoretic. No erythema. No pallor.   Psychiatric: He has a normal mood and affect. His behavior is normal.         ECG 12 Lead  Date/Time: 12/10/2019 12:16 PM  Performed by: Shazia Alva APRN  Authorized by: Shazia Alva APRN   Comparison: compared with previous ECG from 2019  Similar to previous ECG  Rhythm: sinus rhythm  Ectopy: unifocal PVCs  Rate: normal  Conduction: left bundle branch block  QRS axis: normal    Clinical impression: abnormal EKG            Stress Testin19  Interpretation Summary     · Left ventricular ejection fraction is moderately reduced (Calculated EF = 38%).  · Myocardial perfusion imaging indicates a medium-sized infarct located in the inferior wall and lateral wall with no significant ischemia noted.  · Impressions are consistent with an " intermediate risk study.         Cardiac Echo:  8/14/19  Interpretation Summary     · Left ventricular systolic function is mildly decreased. Calculated EF = 50.0%. Estimated EF was in disagreement with the calculated EF. Estimated EF appears to be in the range of 41 - 45%. Normal left ventricular cavity size and wall thickness noted.  · The following segments are hypokinetic: basal inferior, basal inferolateral, mid inferior, mid inferolateral, mid anterolateral and apical lateral.  · Left ventricular diastolic dysfunction is noted (grade I) consistent with impaired relaxation.  · The aortic valve is not well visualized. The aortic valve is abnormal in structure. There is moderate calcification of the aortic valve.  · Mild to moderate aortic valve stenosis is present. It may be more severe.  · Mild MAC is present.           ASSESSMENT:      Diagnosis Plan   1. Abnormal ECG  ECG 12 Lead   2. Chronic atrial fibrillation     3. Symptomatic bradycardia     4. LV dysfunction     5. Pacemaker     6. Essential hypertension           PLAN OF CARE:     1.  CAD- most recent ischemic work-up and echo as above.  Patient remains asymptomatic with no complaints of chest pain or shortness of breath.  Continue aspirin, statin, lisinopril.    2.  Symptomatic bradycardia- left subclavian PPM insertion site healed well, ECG in office today reveals sinus rhythm with PVCs and previously noted LBBB with rate in the 90s.    3.  Hypertension- blood pressure in office today remains well controlled.  Continue current medication regimen to include diltiazem and lisinopril.    Importance of controlling hypertension and blood pressure  checkup on the regular basis has been explained  Hypertension as a silent killer has been discussed  Risk reduction of the weight and regular exercises to control the hypertension has been explained      4.  Hyperlipidemia- last lipid panel as previously dictated.  Continue atorvastatin.  Will repeat fasting  lipid panel and CMP with follow-up appointment in 6 months.    Risk of the hyperlipidemia, importance of the treatment has been explained  Pros and cons of the statins has been explained  Regular blood workup as well as side effects including the liver failure, myelopathy death has been explained       5.  Chronic atrial fibrillation- currently sinus rhythm on ECG in office today.  Not anticoagulated given age, elderly, and frailty.  Continue aspirin.    Continue current medication regimen, no change to plan of care at this time, follow-up in 6 months or sooner if needed.      Sincerely,   ANTON Kevin  [facility]  12/10/19  1:40 PM

## 2019-12-13 ENCOUNTER — CLINICAL SUPPORT NO REQUIREMENTS (OUTPATIENT)
Dept: CARDIOLOGY | Facility: CLINIC | Age: 74
End: 2019-12-13

## 2019-12-13 DIAGNOSIS — Z95.0 PACEMAKER: Primary | ICD-10-CM

## 2019-12-13 PROCEDURE — 93296 REM INTERROG EVL PM/IDS: CPT | Performed by: INTERNAL MEDICINE

## 2019-12-13 PROCEDURE — 93294 REM INTERROG EVL PM/LDLS PM: CPT | Performed by: INTERNAL MEDICINE

## 2020-02-19 ENCOUNTER — LAB REQUISITION (OUTPATIENT)
Dept: LAB | Facility: HOSPITAL | Age: 75
End: 2020-02-19

## 2020-02-19 DIAGNOSIS — R05.9 COUGH: ICD-10-CM

## 2020-02-19 LAB
B PARAPERT DNA SPEC QL NAA+PROBE: NOT DETECTED
B PERT DNA SPEC QL NAA+PROBE: NOT DETECTED
C PNEUM DNA NPH QL NAA+NON-PROBE: NOT DETECTED
FLUAV H1 2009 PAND RNA NPH QL NAA+PROBE: DETECTED
FLUAV H1 HA GENE NPH QL NAA+PROBE: NOT DETECTED
FLUAV H3 RNA NPH QL NAA+PROBE: NOT DETECTED
FLUBV RNA ISLT QL NAA+PROBE: NOT DETECTED
HADV DNA SPEC NAA+PROBE: NOT DETECTED
HCOV 229E RNA SPEC QL NAA+PROBE: NOT DETECTED
HCOV HKU1 RNA SPEC QL NAA+PROBE: NOT DETECTED
HCOV NL63 RNA SPEC QL NAA+PROBE: NOT DETECTED
HCOV OC43 RNA SPEC QL NAA+PROBE: NOT DETECTED
HMPV RNA NPH QL NAA+NON-PROBE: NOT DETECTED
HPIV1 RNA SPEC QL NAA+PROBE: NOT DETECTED
HPIV2 RNA SPEC QL NAA+PROBE: NOT DETECTED
HPIV3 RNA NPH QL NAA+PROBE: NOT DETECTED
HPIV4 P GENE NPH QL NAA+PROBE: NOT DETECTED
M PNEUMO IGG SER IA-ACNC: NOT DETECTED
RHINOVIRUS RNA SPEC NAA+PROBE: NOT DETECTED
RSV RNA NPH QL NAA+NON-PROBE: NOT DETECTED

## 2020-02-19 PROCEDURE — 0100U HC BIOFIRE FILMARRAY RESP PANEL 2: CPT | Performed by: INTERNAL MEDICINE

## 2020-03-12 ENCOUNTER — CLINICAL SUPPORT NO REQUIREMENTS (OUTPATIENT)
Dept: CARDIOLOGY | Facility: CLINIC | Age: 75
End: 2020-03-12

## 2020-03-12 DIAGNOSIS — Z45.018 PACEMAKER REPROGRAMMING/CHECK: Primary | ICD-10-CM

## 2020-03-12 PROCEDURE — 93280 PM DEVICE PROGR EVAL DUAL: CPT | Performed by: INTERNAL MEDICINE

## 2020-06-09 ENCOUNTER — TELEMEDICINE (OUTPATIENT)
Dept: CARDIOLOGY | Facility: CLINIC | Age: 75
End: 2020-06-09

## 2020-06-09 VITALS
SYSTOLIC BLOOD PRESSURE: 144 MMHG | BODY MASS INDEX: 46.53 KG/M2 | WEIGHT: 237 LBS | DIASTOLIC BLOOD PRESSURE: 89 MMHG | HEIGHT: 60 IN

## 2020-06-09 DIAGNOSIS — I48.20 CHRONIC ATRIAL FIBRILLATION (HCC): ICD-10-CM

## 2020-06-09 DIAGNOSIS — I25.10 CORONARY ARTERY DISEASE INVOLVING NATIVE HEART WITHOUT ANGINA PECTORIS, UNSPECIFIED VESSEL OR LESION TYPE: ICD-10-CM

## 2020-06-09 DIAGNOSIS — I10 ESSENTIAL HYPERTENSION: Primary | ICD-10-CM

## 2020-06-09 DIAGNOSIS — E78.2 MIXED HYPERLIPIDEMIA: ICD-10-CM

## 2020-06-09 DIAGNOSIS — Z95.0 PACEMAKER: ICD-10-CM

## 2020-06-09 PROCEDURE — 99441 PR PHYS/QHP TELEPHONE EVALUATION 5-10 MIN: CPT | Performed by: NURSE PRACTITIONER

## 2020-06-09 RX ORDER — FLUTICASONE PROPIONATE 50 MCG
2 SPRAY, SUSPENSION (ML) NASAL DAILY
COMMUNITY
End: 2021-03-18

## 2020-06-09 RX ORDER — LORATADINE 10 MG/1
CAPSULE, LIQUID FILLED ORAL
COMMUNITY
End: 2021-03-18

## 2020-06-09 RX ORDER — AMLODIPINE BESYLATE 5 MG/1
5 TABLET ORAL DAILY
COMMUNITY
End: 2021-03-18

## 2020-06-09 NOTE — PROGRESS NOTES
Subjective:        Edson Myers is a 75 y.o. male who here for follow up    Chief Complaint   Patient presents with   • Follow-up     6MO     For CAD telephone.     HPI     Edson Virgen is a 75-year-old male, who is known to this provider.  He has a history of atrial fibrillation, bradycardia, hyperlipidemia, LBBB, arthritis, coronary artery disease, history of stroke.  In 2019 he had a monitor study which revealed normal sinus rhythm with severe bradycardia, pauses up to 3 seconds.  His stress test in 2019 indicated a medium sized infarct located in the inferior wall and lateral wall with no significant ischemia noted.  In 2019 he received a dual-chamber pacemaker.     He denies chest pain, shortness of breath, palpitations, syncope and near syncope.    The following portions of the patient's history were reviewed and updated as appropriate: allergies, current medications, past family history, past medical history, past social history, past surgical history and problem list.    Past Medical History:   Diagnosis Date   • Acute kidney injury (CMS/HCC)    • Altered mental status, unspecified    • Anemia    • Arthritis     bilateral hands   • Aspiration pneumonia (CMS/[facility])    • Carotid stenosis, bilateral    • Chronic atrial fibrillation (CMS/HCC)    • Coronary artery disease    • Dementia (CMS/HCC)    • Depression    • Diabetes mellitus (CMS/HCC)    • Dysphagia    • Encephalopathy acute    • GERD (gastroesophageal reflux disease)    • Hyperlipidemia    • Hypertension    • Hypocalcemia    • Left bundle branch block    • LV dysfunction     mild, EF 40-45%   • Septic shock (CMS/HCC)    • Stroke (CMS/HCC)    • Syncope    • Ulcerative colitis (CMS/[facility])          reports that he has quit smoking. His smoking use included cigarettes. He has a 120.00 pack-year smoking history. He has never used smokeless tobacco. Drug use questions deferred to the physician. He reports that he does not drink alcohol.     History reviewed.  No pertinent family history.    ROS     Review of Systems  Constitutional:No wt loss, fever, fatigue  Gastrointestinal: no nausea, abdominal pain  Behavioral/Psych: no insomnia or anxiety  Cardiovascular: Denies chest pain/pressure, syncope      Objective:       This is a telephone, unable to do PE.     Physical Exam    Procedures     2019    Interpretation Summary     · An abnormal monitor study.  · Nsr , sever bradycardia, pause up to 3 sec     2019  Interpretation Summary     · Left ventricular ejection fraction is moderately reduced (Calculated EF = 38%).  · Myocardial perfusion imaging indicates a medium-sized infarct located in the inferior wall and lateral wall with no significant ischemia noted.  · Impressions are consistent with an intermediate risk study.     2019  Conclusion        · Successful dual-chamber pacemaker implantation       Current Outpatient Medications:   •  amLODIPine (NORVASC) 5 MG tablet, Take 5 mg by mouth Daily., Disp: , Rfl:   •  aspirin 81 MG EC tablet, Take 81 mg by mouth Daily., Disp: , Rfl:   •  atorvastatin (LIPITOR) 10 MG tablet, Take 10 mg by mouth Daily., Disp: , Rfl:   •  Calcium Carb-Cholecalciferol (OYSTER SHELL CALCIUM/VITAMIN D) 250-125 MG-UNIT tablet tablet, Take 2 tablets by mouth Daily., Disp: , Rfl:   •  dilTIAZem CD (CARDIZEM CD) 180 MG 24 hr capsule, Take 180 mg by mouth Daily., Disp: , Rfl:   •  Divalproex Sodium (DEPAKOTE SPRINKLE) 125 MG capsule, Take 125 mg by mouth 2 (Two) Times a Day., Disp: , Rfl:   •  donepezil (ARICEPT) 10 MG tablet, Take 10 mg by mouth Every Night., Disp: , Rfl:   •  fluticasone (FLONASE) 50 MCG/ACT nasal spray, 2 sprays into the nostril(s) as directed by provider Daily., Disp: , Rfl:   •  lisinopril (PRINIVIL,ZESTRIL) 20 MG tablet, Take 20 mg by mouth Daily., Disp: , Rfl:   •  Loratadine 10 MG capsule, Take  by mouth., Disp: , Rfl:   •  memantine (NAMENDA XR) 7 MG capsule sustained-release 24 hr extended release capsule, Take 28 mg by  mouth Daily., Disp: , Rfl:   •  mirtazapine (REMERON) 15 MG tablet, Take 15 mg by mouth Every Night., Disp: , Rfl:   •  olopatadine (PATADAY) 0.2 % solution ophthalmic solution, 1 drop Every 8 (Eight) Hours., Disp: , Rfl:   •  omeprazole (priLOSEC) 40 MG capsule, Take 40 mg by mouth Daily., Disp: , Rfl:   •  sulfaSALAzine (AZULFIDINE) 500 MG tablet, Take 500 mg by mouth 3 (Three) Times a Day., Disp: , Rfl:   •  ipratropium-albuterol (DUO-NEB) 0.5-2.5 mg/3 ml nebulizer, Take 3 mL by nebulization Every 4 (Four) Hours As Needed for Wheezing., Disp: , Rfl:      Assessment:        Patient Active Problem List   Diagnosis   • Chronic atrial fibrillation (CMS/HCC)   • Symptomatic bradycardia   • Dementia (CMS/HCC)   • LV dysfunction   • Pacemaker   • Essential hypertension               Plan:   1.  Chronic atrial fibrillation fibrillation: Contineu asa.  Not historically anticoagulated due to age, frailty and history of falling.    2.  CAD: Denies chest pain previous ischemic work-up as above.  Continue statin and aspirin.    Risk reduction for the coronary artery disease, controlling the blood pressure, blood sugar management, cholesterol management, exercise, stress management, and proper compliance with medications and follow-up has been discussed    3.  Hypertension: His nurse states his blood pressure has been stable on current medications.  Continue amlodipine, and lisinopril.    Educated patient on exercising for at least 30 minutes a day for 2 to 3 days a week. Importance of controlling hypertension and blood pressure checkup on the regular basis has been explained. Hypertension as a silent killer has been discussed. Risk reduction of the weight and regular exercises to control the hypertension has been explained.    4.  Hyperlipidemia: His facility manages his cholesterol.    Risk of the hyperlipidemia, importance of the treatment has been explained. Pros and cons of the statins has been explained. Regular blood  workup as well as side effects including the liver failure, myelopathy death has been explained.    5. Pacemaker: on 3/12/20 changes were made to pacemaker.  It is noted for him to have 5 NSVT.     No diagnosis found.    There are no diagnoses linked to this encounter.    COUNSELING: None    Edson Escobar was given to patient for the following topics: diagnostic results, risk factor reductions, impressions, risks and benefits of treatment options and importance of treatment compliance .       SMOKING COUNSELING: Denies    Follow-up in 6 months, unless he needs to be seen sooner.    Sincerely,   ANTON Sharma  [facility]  06/09/20  12:29     This patient has consented to a telehealth visit via telephone. The visit was scheduled as a telephone visit to comply with patient safety concerns in accordance with CDC recommendations.  All vitals recorded within this visit are reported by the patient.  I spent  20 minutes in total including but not limited to the 5 minutes spent in direct conversation with this patient.      EMR Dragon/Transcription disclaimer:   Much of this encounter note is an electronic transcription/translation of spoken language to printed text. The electronic translation of spoken language may permit erroneous, or at times, nonsensical words or phrases to be inadvertently transcribed; Although I have reviewed the note for such errors, some may still exist.

## 2020-06-12 ENCOUNTER — CLINICAL SUPPORT NO REQUIREMENTS (OUTPATIENT)
Dept: CARDIOLOGY | Facility: CLINIC | Age: 75
End: 2020-06-12

## 2020-06-12 DIAGNOSIS — Z95.0 PACEMAKER: Primary | ICD-10-CM

## 2020-06-12 PROCEDURE — 93294 REM INTERROG EVL PM/LDLS PM: CPT | Performed by: INTERNAL MEDICINE

## 2020-06-12 PROCEDURE — 93296 REM INTERROG EVL PM/IDS: CPT | Performed by: INTERNAL MEDICINE

## 2020-07-24 ENCOUNTER — LAB REQUISITION (OUTPATIENT)
Dept: LAB | Facility: HOSPITAL | Age: 75
End: 2020-07-24

## 2020-07-24 DIAGNOSIS — N18.9 CHRONIC KIDNEY DISEASE, UNSPECIFIED: ICD-10-CM

## 2020-07-24 LAB
BILIRUB UR QL STRIP: NEGATIVE
CLARITY UR: CLEAR
COLOR UR: YELLOW
GLUCOSE UR STRIP-MCNC: NEGATIVE MG/DL
HGB UR QL STRIP.AUTO: NEGATIVE
KETONES UR QL STRIP: NEGATIVE
LEUKOCYTE ESTERASE UR QL STRIP.AUTO: NEGATIVE
NITRITE UR QL STRIP: NEGATIVE
PH UR STRIP.AUTO: 5.5 [PH] (ref 4.5–8)
PROT UR QL STRIP: NEGATIVE
SP GR UR STRIP: 1.02 (ref 1–1.03)
UROBILINOGEN UR QL STRIP: NORMAL

## 2020-07-24 PROCEDURE — 81003 URINALYSIS AUTO W/O SCOPE: CPT | Performed by: FAMILY MEDICINE

## 2020-09-17 ENCOUNTER — CLINICAL SUPPORT NO REQUIREMENTS (OUTPATIENT)
Dept: CARDIOLOGY | Facility: CLINIC | Age: 75
End: 2020-09-17

## 2020-09-17 DIAGNOSIS — Z95.0 PACEMAKER: Primary | ICD-10-CM

## 2020-09-17 PROCEDURE — 93280 PM DEVICE PROGR EVAL DUAL: CPT | Performed by: INTERNAL MEDICINE

## 2021-01-26 ENCOUNTER — LAB REQUISITION (OUTPATIENT)
Dept: LAB | Facility: HOSPITAL | Age: 76
End: 2021-01-26

## 2021-01-26 DIAGNOSIS — E87.70 FLUID OVERLOAD, UNSPECIFIED: ICD-10-CM

## 2021-01-26 LAB — POTASSIUM SERPL-SCNC: 4.2 MMOL/L (ref 3.5–5.2)

## 2021-01-26 PROCEDURE — 84132 ASSAY OF SERUM POTASSIUM: CPT | Performed by: FAMILY MEDICINE

## 2021-03-18 ENCOUNTER — OFFICE VISIT (OUTPATIENT)
Dept: CARDIOLOGY | Facility: CLINIC | Age: 76
End: 2021-03-18

## 2021-03-18 ENCOUNTER — CLINICAL SUPPORT NO REQUIREMENTS (OUTPATIENT)
Dept: CARDIOLOGY | Facility: CLINIC | Age: 76
End: 2021-03-18

## 2021-03-18 VITALS
HEART RATE: 109 BPM | DIASTOLIC BLOOD PRESSURE: 93 MMHG | BODY MASS INDEX: 32.14 KG/M2 | HEIGHT: 72 IN | SYSTOLIC BLOOD PRESSURE: 136 MMHG

## 2021-03-18 DIAGNOSIS — I10 ESSENTIAL HYPERTENSION: ICD-10-CM

## 2021-03-18 DIAGNOSIS — Z95.0 PACEMAKER: ICD-10-CM

## 2021-03-18 DIAGNOSIS — Z95.0 PACEMAKER: Primary | ICD-10-CM

## 2021-03-18 DIAGNOSIS — I48.20 CHRONIC ATRIAL FIBRILLATION (HCC): Primary | ICD-10-CM

## 2021-03-18 PROCEDURE — 99213 OFFICE O/P EST LOW 20 MIN: CPT | Performed by: INTERNAL MEDICINE

## 2021-03-18 PROCEDURE — 93288 INTERROG EVL PM/LDLS PM IP: CPT | Performed by: INTERNAL MEDICINE

## 2021-03-18 RX ORDER — CETIRIZINE HYDROCHLORIDE 10 MG/1
10 TABLET ORAL DAILY
COMMUNITY

## 2021-03-18 RX ORDER — ACETAMINOPHEN 500 MG
500 TABLET ORAL EVERY 6 HOURS PRN
COMMUNITY

## 2021-03-18 RX ORDER — AMMONIUM LACTATE 12 G/100G
LOTION TOPICAL
COMMUNITY
Start: 2021-01-21 | End: 2021-03-18

## 2021-03-18 NOTE — PROGRESS NOTES
3 month follow up with pacemaker check   Subjective:        Edson Myers is a 76 y.o. male who here for follow up    CC  SYNCOPE, PACEMAKER  HPI  76-year-old male with chronic atrial fibrillation, pacemaker, benign essential arterial hypertension here for the follow-up for the syncope as well as a pacemaker with no complaints of chest pains tightness heaviness or the pressure sensation     Problems Addressed this Visit        Cardiac and Vasculature    Chronic atrial fibrillation (CMS/HCC) - Primary    Relevant Orders    Adult Transthoracic Echo Complete W/ Cont if Necessary Per Protocol    Pacemaker    Relevant Orders    Adult Transthoracic Echo Complete W/ Cont if Necessary Per Protocol    Essential hypertension    Relevant Orders    Adult Transthoracic Echo Complete W/ Cont if Necessary Per Protocol      Diagnoses       Codes Comments    Chronic atrial fibrillation (CMS/HCC)    -  Primary ICD-10-CM: I48.20  ICD-9-CM: 427.31     Pacemaker     ICD-10-CM: Z95.0  ICD-9-CM: V45.01     Essential hypertension     ICD-10-CM: I10  ICD-9-CM: 401.9         .    The following portions of the patient's history were reviewed and updated as appropriate: allergies, current medications, past family history, past medical history, past social history, past surgical history and problem list.    Past Medical History:   Diagnosis Date   • Acute kidney injury (CMS/HCC)    • Altered mental status, unspecified    • Anemia    • Arthritis     bilateral hands   • Aspiration pneumonia (CMS/HCC)    • Carotid stenosis, bilateral    • Chronic atrial fibrillation (CMS/HCC)    • Coronary artery disease    • Dementia (CMS/HCC)    • Depression    • Diabetes mellitus (CMS/HCC)    • Dysphagia    • Encephalopathy acute    • GERD (gastroesophageal reflux disease)    • Hyperlipidemia    • Hypertension    • Hypocalcemia    • Left bundle branch block    • LV dysfunction     mild, EF 40-45%   • Septic shock (CMS/HCC)    • Stroke (CMS/HCC)    • Syncope   "  • Ulcerative colitis (CMS/HCC)      reports that he has quit smoking. His smoking use included cigarettes. He has a 120.00 pack-year smoking history. He has never used smokeless tobacco. Drug use questions deferred to the physician. He reports that he does not drink alcohol. History reviewed. No pertinent family history.    Review of Systems  Constitutional: No wt loss, fever, fatigue  Gastrointestinal: No nausea, abdominal pain  Behavioral/Psych: No insomnia or anxiety   Cardiovascular no chest pains or tightness in the chest  Objective:       Physical Exam  /93 (BP Location: Right arm, Patient Position: Sitting)   Pulse 109   Ht 182.9 cm (72\")   BMI 32.14 kg/m²   General appearance: No acute changes   Neck: Trachea midline; NECK, supple, no thyromegaly or lymphadenopathy   Lungs: Normal size and shape, normal breath sounds, equal distribution of air, no rales and rhonchi   CV: S1-S2 regular, no murmurs, no rub, no gallop   Abdomen: Soft, non-tender; no masses , no abnormal abdominal sounds   Extremities: No deformity , normal color , no peripheral edema   Skin: Normal temperature, turgor and texture; no rash, ulcers          Procedures      Echocardiogram:        Current Outpatient Medications:   •  acetaminophen (TYLENOL) 500 MG tablet, Take 500 mg by mouth Every 6 (Six) Hours As Needed for Mild Pain ., Disp: , Rfl:   •  aspirin 81 MG EC tablet, Take 81 mg by mouth Daily., Disp: , Rfl:   •  atorvastatin (LIPITOR) 10 MG tablet, Take 10 mg by mouth Daily., Disp: , Rfl:   •  Calcium Carb-Cholecalciferol (OYSTER SHELL CALCIUM/VITAMIN D) 250-125 MG-UNIT tablet tablet, Take 2 tablets by mouth Daily., Disp: , Rfl:   •  cetirizine (zyrTEC) 10 MG tablet, Take 10 mg by mouth Daily., Disp: , Rfl:   •  dilTIAZem CD (CARDIZEM CD) 180 MG 24 hr capsule, Take 180 mg by mouth Daily., Disp: , Rfl:   •  Divalproex Sodium (DEPAKOTE SPRINKLE) 125 MG capsule, Take 125 mg by mouth 2 (Two) Times a Day., Disp: , Rfl:   •  " memantine (NAMENDA XR) 7 MG capsule sustained-release 24 hr extended release capsule, Take 28 mg by mouth Daily., Disp: , Rfl:   •  mirtazapine (REMERON) 15 MG tablet, Take 15 mg by mouth Every Night., Disp: , Rfl:   •  omeprazole (priLOSEC) 40 MG capsule, Take 20 mg by mouth Daily., Disp: , Rfl:   •  sulfaSALAzine (AZULFIDINE) 500 MG tablet, Take 500 mg by mouth 3 (Three) Times a Day., Disp: , Rfl:   •  sertraline (Zoloft) 50 MG tablet, Daily., Disp: , Rfl:    Assessment:        Patient Active Problem List   Diagnosis   • Chronic atrial fibrillation (CMS/HCC)   • Symptomatic bradycardia   • Dementia (CMS/HCC)   • LV dysfunction   • Pacemaker   • Essential hypertension               Plan:            ICD-10-CM ICD-9-CM   1. Chronic atrial fibrillation (CMS/HCC)  I48.20 427.31   2. Pacemaker  Z95.0 V45.01   3. Essential hypertension  I10 401.9     1. Pacemaker  Pacemaker functioning normally  - Adult Transthoracic Echo Complete W/ Cont if Necessary Per Protocol; Future    2. Essential hypertension  Blood pressure under control  - Adult Transthoracic Echo Complete W/ Cont if Necessary Per Protocol; Future    3. Chronic atrial fibrillation (CMS/HCC)  Controlled  - Adult Transthoracic Echo Complete W/ Cont if Necessary Per Protocol; Future       Edson Myers needs anti coagulation  At this point pt is not on anticoagulations.  Pros and cons of anticoagulations has been discussed  Alternate methods including Watchman has been discussed  At this stage it has been decided NO ANTICOAGULATIONS      1 YR WITH ECHO  COUNSELING:    Edson Escobar was given to patient for the following topics: diagnostic results, risk factor reductions, impressions, risks and benefits of treatment options and importance of treatment compliance .       SMOKING COUNSELING:    [unfilled]    Dictated using Dragon dictation

## 2021-05-04 ENCOUNTER — TELEPHONE (OUTPATIENT)
Dept: CARDIOLOGY | Facility: CLINIC | Age: 76
End: 2021-05-04

## 2021-05-04 NOTE — TELEPHONE ENCOUNTER
Called Nursing home, spoke with nurse notified that home transmitter not transmitting, will need to ensure all wires connected and send manual transmission if still flashing orange then will need to call [place] patient services.  ALP

## 2021-05-19 PROCEDURE — 93296 REM INTERROG EVL PM/IDS: CPT | Performed by: INTERNAL MEDICINE

## 2021-05-19 PROCEDURE — 93294 REM INTERROG EVL PM/LDLS PM: CPT | Performed by: INTERNAL MEDICINE

## 2022-03-16 DIAGNOSIS — I48.20 CHRONIC ATRIAL FIBRILLATION: Primary | ICD-10-CM

## 2022-03-16 DIAGNOSIS — I10 ESSENTIAL HYPERTENSION: ICD-10-CM

## 2022-03-17 ENCOUNTER — APPOINTMENT (OUTPATIENT)
Dept: CARDIOLOGY | Facility: HOSPITAL | Age: 77
End: 2022-03-17

## 2022-05-05 ENCOUNTER — CLINICAL SUPPORT NO REQUIREMENTS (OUTPATIENT)
Dept: CARDIOLOGY | Facility: CLINIC | Age: 77
End: 2022-05-05

## 2022-05-05 ENCOUNTER — OFFICE VISIT (OUTPATIENT)
Dept: CARDIOLOGY | Facility: CLINIC | Age: 77
End: 2022-05-05

## 2022-05-05 ENCOUNTER — HOSPITAL ENCOUNTER (OUTPATIENT)
Dept: CARDIOLOGY | Facility: HOSPITAL | Age: 77
Discharge: HOME | End: 2022-05-05
Admitting: INTERNAL MEDICINE

## 2022-05-05 VITALS
SYSTOLIC BLOOD PRESSURE: 150 MMHG | BODY MASS INDEX: 32.1 KG/M2 | HEART RATE: 101 BPM | HEIGHT: 72 IN | DIASTOLIC BLOOD PRESSURE: 95 MMHG | WEIGHT: 237 LBS

## 2022-05-05 VITALS
SYSTOLIC BLOOD PRESSURE: 150 MMHG | HEART RATE: 97 BPM | BODY MASS INDEX: 32.14 KG/M2 | HEIGHT: 72 IN | DIASTOLIC BLOOD PRESSURE: 95 MMHG

## 2022-05-05 DIAGNOSIS — I48.0 PAROXYSMAL ATRIAL FIBRILLATION: Primary | ICD-10-CM

## 2022-05-05 DIAGNOSIS — Z95.0 PACEMAKER: ICD-10-CM

## 2022-05-05 DIAGNOSIS — I10 ESSENTIAL HYPERTENSION: ICD-10-CM

## 2022-05-05 DIAGNOSIS — Z95.0 PACEMAKER: Primary | ICD-10-CM

## 2022-05-05 DIAGNOSIS — R01.1 HEART MURMUR: ICD-10-CM

## 2022-05-05 PROBLEM — I48.20 CHRONIC ATRIAL FIBRILLATION: Status: RESOLVED | Noted: 2019-08-26 | Resolved: 2022-05-05

## 2022-05-05 LAB
AORTIC DIMENSIONLESS INDEX: 0.6 (DI)
ASCENDING AORTA: 3.5 CM
BH CV ECHO MEAS - AO MAX PG: 11.9 MMHG
BH CV ECHO MEAS - AO MEAN PG: 6.2 MMHG
BH CV ECHO MEAS - AO ROOT DIAM: 4.4 CM
BH CV ECHO MEAS - AO V2 MAX: 172.7 CM/SEC
BH CV ECHO MEAS - AO V2 VTI: 32 CM
BH CV ECHO MEAS - AVA(I,D): 2.12 CM2
BH CV ECHO MEAS - EDV(CUBED): 82.3 ML
BH CV ECHO MEAS - EDV(MOD-SP2): 84 ML
BH CV ECHO MEAS - EDV(MOD-SP4): 59 ML
BH CV ECHO MEAS - EF(MOD-BP): 55.5 %
BH CV ECHO MEAS - EF(MOD-SP2): 57.1 %
BH CV ECHO MEAS - EF(MOD-SP4): 55.9 %
BH CV ECHO MEAS - ESV(CUBED): 22.3 ML
BH CV ECHO MEAS - ESV(MOD-SP2): 36 ML
BH CV ECHO MEAS - ESV(MOD-SP4): 26 ML
BH CV ECHO MEAS - FS: 35.3 %
BH CV ECHO MEAS - IVS/LVPW: 1.15 CM
BH CV ECHO MEAS - IVSD: 1.41 CM
BH CV ECHO MEAS - LAT PEAK E' VEL: 9.5 CM/SEC
BH CV ECHO MEAS - LV DIASTOLIC VOL/BSA (35-75): 25.8 CM2
BH CV ECHO MEAS - LV MASS(C)D: 214.2 GRAMS
BH CV ECHO MEAS - LV MAX PG: 2.44 MMHG
BH CV ECHO MEAS - LV MEAN PG: 1.32 MMHG
BH CV ECHO MEAS - LV SYSTOLIC VOL/BSA (12-30): 11.4 CM2
BH CV ECHO MEAS - LV V1 MAX: 78 CM/SEC
BH CV ECHO MEAS - LV V1 VTI: 17.8 CM
BH CV ECHO MEAS - LVIDD: 4.4 CM
BH CV ECHO MEAS - LVIDS: 2.8 CM
BH CV ECHO MEAS - LVOT AREA: 3.8 CM2
BH CV ECHO MEAS - LVOT DIAM: 2.2 CM
BH CV ECHO MEAS - LVPWD: 1.22 CM
BH CV ECHO MEAS - MED PEAK E' VEL: 4.8 CM/SEC
BH CV ECHO MEAS - MV A DUR: 0.14 SEC
BH CV ECHO MEAS - MV A MAX VEL: 98 CM/SEC
BH CV ECHO MEAS - MV DEC SLOPE: 206.5 CM/SEC2
BH CV ECHO MEAS - MV DEC TIME: 195 MSEC
BH CV ECHO MEAS - MV E MAX VEL: 50.4 CM/SEC
BH CV ECHO MEAS - MV E/A: 0.51
BH CV ECHO MEAS - MV MAX PG: 4.5 MMHG
BH CV ECHO MEAS - MV MEAN PG: 1.48 MMHG
BH CV ECHO MEAS - MV P1/2T: 89.4 MSEC
BH CV ECHO MEAS - MV V2 VTI: 18.3 CM
BH CV ECHO MEAS - MVA(P1/2T): 2.46 CM2
BH CV ECHO MEAS - MVA(VTI): 3.7 CM2
BH CV ECHO MEAS - PA ACC TIME: 0.05 SEC
BH CV ECHO MEAS - PA PR(ACCEL): 56.4 MMHG
BH CV ECHO MEAS - PA V2 MAX: 97.9 CM/SEC
BH CV ECHO MEAS - PULM A REVS DUR: 0.13 SEC
BH CV ECHO MEAS - PULM A REVS VEL: 20.5 CM/SEC
BH CV ECHO MEAS - PULM DIAS VEL: 30.9 CM/SEC
BH CV ECHO MEAS - PULM S/D: 0.94
BH CV ECHO MEAS - PULM SYS VEL: 29 CM/SEC
BH CV ECHO MEAS - QP/QS: 0.8
BH CV ECHO MEAS - RV MAX PG: 2.26 MMHG
BH CV ECHO MEAS - RV V1 MAX: 75.2 CM/SEC
BH CV ECHO MEAS - RV V1 VTI: 10.5 CM
BH CV ECHO MEAS - RVOT DIAM: 2.6 CM
BH CV ECHO MEAS - SI(MOD-SP2): 21 ML/M2
BH CV ECHO MEAS - SI(MOD-SP4): 14.4 ML/M2
BH CV ECHO MEAS - SV(LVOT): 67.8 ML
BH CV ECHO MEAS - SV(MOD-SP2): 48 ML
BH CV ECHO MEAS - SV(MOD-SP4): 33 ML
BH CV ECHO MEAS - SV(RVOT): 54.3 ML
BH CV ECHO MEAS - TAPSE (>1.6): 1.5 CM
BH CV ECHO MEASUREMENTS AVERAGE E/E' RATIO: 7.05
BH CV XLRA - TDI S': 9.1 CM/SEC
LEFT ATRIUM VOLUME INDEX: 17.9 ML/M2
MAXIMAL PREDICTED HEART RATE: 142 BPM
SINUS: 3.4 CM
STRESS TARGET HR: 121 BPM

## 2022-05-05 PROCEDURE — 93306 TTE W/DOPPLER COMPLETE: CPT

## 2022-05-05 PROCEDURE — 99214 OFFICE O/P EST MOD 30 MIN: CPT | Performed by: INTERNAL MEDICINE

## 2022-05-05 PROCEDURE — 93280 PM DEVICE PROGR EVAL DUAL: CPT | Performed by: INTERNAL MEDICINE

## 2022-05-05 PROCEDURE — 93306 TTE W/DOPPLER COMPLETE: CPT | Performed by: INTERNAL MEDICINE

## 2022-05-05 PROCEDURE — 93000 ELECTROCARDIOGRAM COMPLETE: CPT | Performed by: INTERNAL MEDICINE

## 2022-05-05 RX ORDER — FLUTICASONE PROPIONATE 50 MCG
SPRAY, SUSPENSION (ML) NASAL
COMMUNITY
Start: 2022-04-07

## 2022-05-05 RX ORDER — MONTELUKAST SODIUM 10 MG/1
TABLET ORAL
COMMUNITY
Start: 2022-04-16

## 2022-05-05 NOTE — PROGRESS NOTES
1 YR FOLLOW UP WITH ECHO AND DEVICE CHECK   Subjective:        Edson Myers is a 77 y.o. male who here for follow up    CC  Follow-up hypertension pacemaker atrial fibrillation  HPI  77-year-old male with a pacemaker, benign essential arterial hypertension paroxysmal atrial fibrillation and heart murmur here for the follow-up with no complaints of chest pains tightness heaviness or the pressure sensation     Problems Addressed this Visit        Cardiac and Vasculature    Pacemaker    Essential hypertension    Paroxysmal atrial fibrillation (HCC) - Primary    Heart murmur      Diagnoses       Codes Comments    Paroxysmal atrial fibrillation (HCC)    -  Primary ICD-10-CM: I48.0  ICD-9-CM: 427.31     Heart murmur     ICD-10-CM: R01.1  ICD-9-CM: 785.2     Pacemaker     ICD-10-CM: Z95.0  ICD-9-CM: V45.01     Essential hypertension     ICD-10-CM: I10  ICD-9-CM: 401.9         .    The following portions of the patient's history were reviewed and updated as appropriate: allergies, current medications, past family history, past medical history, past social history, past surgical history and problem list.    Past Medical History:   Diagnosis Date   • Acute kidney injury (HCC)    • Altered mental status, unspecified    • Anemia    • Arthritis     bilateral hands   • Aspiration pneumonia (HCC)    • Carotid stenosis, bilateral    • Chronic atrial fibrillation (HCC)    • Coronary artery disease    • Dementia (HCC)    • Depression    • Diabetes mellitus (HCC)    • Dysphagia    • Encephalopathy acute    • GERD (gastroesophageal reflux disease)    • Hyperlipidemia    • Hypertension    • Hypocalcemia    • Left bundle branch block    • LV dysfunction     mild, EF 40-45%   • Septic shock ([facility])    • Stroke ([facility])    • Syncope    • Ulcerative colitis ([facility])      reports that he has quit smoking. His smoking use included cigarettes. He has a 120.00 pack-year smoking history. He has never used smokeless tobacco. Drug use questions deferred  "to the physician. He reports that he does not drink alcohol.   Family History   Family history unknown: Yes       Review of Systems  Constitutional: No wt loss, fever, fatigue  Gastrointestinal: No nausea, abdominal pain  Behavioral/Psych: No insomnia or anxiety   Cardiovascular no chest pains or tightness in the chest  Objective:       Physical Exam  /95   Pulse 97   Ht 182.9 cm (72\")   BMI 32.14 kg/m²   General appearance: No acute changes   Neck: Trachea midline; NECK, supple, no thyromegaly or lymphadenopathy   Lungs: Normal size and shape, normal breath sounds, equal distribution of air, no rales and rhonchi   CV: S1-S2 regular, no murmurs, no rub, no gallop   Abdomen: Soft, nontender; no masses , no abnormal abdominal sounds   Extremities: No deformity , normal color , no peripheral edema   Skin: Normal temperature, turgor and texture; no rash, ulcers            ECG 12 Lead    Date/Time: 5/5/2022 12:16 PM  Performed by: Amaya Rizzo MD  Authorized by: Amaya Rizzo MD   Comparison: compared with previous ECG   Similar to previous ECG  Rhythm: sinus tachycardia  ST Flattening: all    Clinical impression: non-specific ECG              Echocardiogram:        Current Outpatient Medications:   •  acetaminophen (TYLENOL) 500 MG tablet, Take 500 mg by mouth Every 6 (Six) Hours As Needed for Mild Pain ., Disp: , Rfl:   •  aspirin 81 MG EC tablet, Take 81 mg by mouth Daily., Disp: , Rfl:   •  atorvastatin (LIPITOR) 10 MG tablet, Take 10 mg by mouth Daily., Disp: , Rfl:   •  Calcium Carb-Cholecalciferol (OYSTER SHELL CALCIUM/VITAMIN D) 250-125 MG-UNIT tablet tablet, Take 2 tablets by mouth Daily., Disp: , Rfl:   •  cetirizine (zyrTEC) 10 MG tablet, Take 10 mg by mouth Daily., Disp: , Rfl:   •  dilTIAZem CD (CARDIZEM CD) 180 MG 24 hr capsule, Take 180 mg by mouth Daily., Disp: , Rfl:   •  Divalproex Sodium (DEPAKOTE SPRINKLE) 125 MG capsule, Take 125 mg by mouth 2 (Two) Times a Day., Disp: , " Rfl:   •  fluticasone (FLONASE) 50 MCG/ACT nasal spray, , Disp: , Rfl:   •  montelukast (SINGULAIR) 10 MG tablet, , Disp: , Rfl:   •  omeprazole (priLOSEC) 40 MG capsule, Take 20 mg by mouth Daily., Disp: , Rfl:   •  sertraline (ZOLOFT) 50 MG tablet, Daily., Disp: , Rfl:   •  sulfaSALAzine (AZULFIDINE) 500 MG tablet, Take 500 mg by mouth 3 (Three) Times a Day., Disp: , Rfl:    Assessment:        Patient Active Problem List   Diagnosis   • Chronic atrial fibrillation (HCC)   • Symptomatic bradycardia   • Dementia (HCC)   • LV dysfunction   • Pacemaker   • Essential hypertension               Plan:            ICD-10-CM ICD-9-CM   1. Paroxysmal atrial fibrillation (HCC)  I48.0 427.31   2. Heart murmur  R01.1 785.2   3. Pacemaker  Z95.0 V45.01   4. Essential hypertension  I10 401.9     1. Paroxysmal atrial fibrillation (HCC)  Under control    2. Heart murmur  No change    3. Pacemaker  Functioning normally    4. Essential hypertension  Blood pressure controlled      1 YR  COUNSELING:    Edson Escobar was given to patient for the following topics: diagnostic results, risk factor reductions, impressions, risks and benefits of treatment options and importance of treatment compliance .       SMOKING COUNSELING:    [unfilled]    Dictated using Dragon dictation

## 2022-05-20 PROCEDURE — 93296 REM INTERROG EVL PM/IDS: CPT | Performed by: INTERNAL MEDICINE

## 2022-05-20 PROCEDURE — 93294 REM INTERROG EVL PM/LDLS PM: CPT | Performed by: INTERNAL MEDICINE

## 2022-11-10 ENCOUNTER — CLINICAL SUPPORT NO REQUIREMENTS (OUTPATIENT)
Dept: CARDIOLOGY | Facility: CLINIC | Age: 77
End: 2022-11-10
Payer: COMMERCIAL

## 2022-11-10 DIAGNOSIS — Z95.0 PACEMAKER: Primary | ICD-10-CM

## 2022-11-10 PROCEDURE — 93280 PM DEVICE PROGR EVAL DUAL: CPT | Performed by: INTERNAL MEDICINE

## 2022-11-18 PROCEDURE — 93294 REM INTERROG EVL PM/LDLS PM: CPT | Performed by: INTERNAL MEDICINE

## 2022-11-18 PROCEDURE — 93296 REM INTERROG EVL PM/IDS: CPT | Performed by: INTERNAL MEDICINE

## 2023-02-17 PROCEDURE — 93294 REM INTERROG EVL PM/LDLS PM: CPT | Performed by: INTERNAL MEDICINE

## 2023-02-17 PROCEDURE — 93296 REM INTERROG EVL PM/IDS: CPT | Performed by: INTERNAL MEDICINE

## 2023-03-07 ENCOUNTER — OFFICE VISIT (OUTPATIENT)
Dept: CARDIOLOGY | Facility: CLINIC | Age: 78
End: 2023-03-07
Payer: COMMERCIAL

## 2023-03-07 VITALS
BODY MASS INDEX: 31.02 KG/M2 | HEIGHT: 72 IN | WEIGHT: 229 LBS | SYSTOLIC BLOOD PRESSURE: 110 MMHG | HEART RATE: 60 BPM | DIASTOLIC BLOOD PRESSURE: 60 MMHG

## 2023-03-07 DIAGNOSIS — R00.0 WIDE-COMPLEX TACHYCARDIA: ICD-10-CM

## 2023-03-07 DIAGNOSIS — Z95.0 PACEMAKER: ICD-10-CM

## 2023-03-07 DIAGNOSIS — I10 ESSENTIAL HYPERTENSION: ICD-10-CM

## 2023-03-07 DIAGNOSIS — I48.0 PAROXYSMAL ATRIAL FIBRILLATION: ICD-10-CM

## 2023-03-07 DIAGNOSIS — I25.10 CORONARY ARTERY DISEASE INVOLVING NATIVE CORONARY ARTERY OF NATIVE HEART WITHOUT ANGINA PECTORIS: Primary | ICD-10-CM

## 2023-03-07 DIAGNOSIS — I42.9 CARDIOMYOPATHY, UNSPECIFIED TYPE: ICD-10-CM

## 2023-03-07 DIAGNOSIS — F03.90 DEMENTIA WITHOUT BEHAVIORAL DISTURBANCE, PSYCHOTIC DISTURBANCE, MOOD DISTURBANCE, OR ANXIETY, UNSPECIFIED DEMENTIA SEVERITY, UNSPECIFIED DEMENTIA TYPE: ICD-10-CM

## 2023-03-07 PROBLEM — R00.1 SYMPTOMATIC BRADYCARDIA: Status: RESOLVED | Noted: 2019-08-31 | Resolved: 2023-03-07

## 2023-03-07 PROCEDURE — 99214 OFFICE O/P EST MOD 30 MIN: CPT | Performed by: INTERNAL MEDICINE

## 2023-03-07 PROCEDURE — 93000 ELECTROCARDIOGRAM COMPLETE: CPT | Performed by: INTERNAL MEDICINE

## 2023-03-07 RX ORDER — AMIODARONE HYDROCHLORIDE 200 MG/1
TABLET ORAL DAILY
COMMUNITY
Start: 2022-12-02

## 2023-03-07 RX ORDER — FUROSEMIDE 20 MG/1
TABLET ORAL DAILY
COMMUNITY
Start: 2023-02-16

## 2023-03-07 RX ORDER — METOPROLOL SUCCINATE 50 MG/1
TABLET, EXTENDED RELEASE ORAL 2 TIMES DAILY
COMMUNITY
Start: 2022-09-26

## 2023-03-07 RX ORDER — PANTOPRAZOLE SODIUM 40 MG/1
TABLET, DELAYED RELEASE ORAL 2 TIMES DAILY
COMMUNITY
Start: 2022-12-02

## 2023-03-07 RX ORDER — POTASSIUM CHLORIDE 1500 MG/1
TABLET, FILM COATED, EXTENDED RELEASE ORAL DAILY
COMMUNITY
Start: 2023-02-17

## 2023-03-07 NOTE — PROGRESS NOTES
Date of Office Visit: 23  Encounter Provider: Maurilio Mai MD  Place of Service: [facility] CARDIOLOGY  Patient Name: Edson Myers  :1945    Chief Complaint   Patient presents with   • Atrial Fibrillation   :     HPI:     Mr. Myers is 77 y.o. and presents today to \[facility]\"" care. He has significant dementia and resides at the [place], which is closer to our [place] office.  He is accompanied by a staff member from his nursing home.  He is in a wheelchair and he is oriented to self.  He is extremely pleasant and very talkative, but only talks about things from the distant past, such as his childhood and early adult years.  He talks about his father as though he is still alive.  He does not remember anything about his cardiac health.    He was previously followed by Dr Rizzo, and then briefly followed with [facility] after a hospitalization in 2021.    He has a remote history of coronary artery disease status post CABG.  He has a very long right lower extremity vein harvest scar.  He has a history of paroxysmal atrial fibrillation and has not been anticoagulated per previous risk-benefit discussions.  He had an echocardiogram in May 2022 that reported normal left ventricular systolic function and wall motion.  He has a history of symptomatic bradycardia and is status post [employer] dual-chamber pacemaker.    He presented to [facility] in 2022 with an out of hospital arrest in the setting of severe hypoxemic respiratory failure and sepsis/pneumonia.  He had a wide-complex tachycardia and required ACLS.  The consult note stated that his rate was 250 bpm and that the tracings were consistent with VT.  I am not sure if the device was interrogated as I cannot see those records in care everywhere.  He was treated with amiodarone and no further ischemic work-up was performed.  An echocardiogram was a technically difficult study but reported an ejection  fraction of 23% with global hypokinesis and inferior akinesis.    He has a remote history of a stroke and reportedly has bilateral internal carotid artery occlusion for which no intervention has been performed or recommended.    His memory is severely limited but in the room today he denies shortness of breath or chest pain.  He has chronic leg swelling and his caregiver says that he is not good at keeping his legs elevated as he likes to be in his wheelchair and to wheel around his nursing home.  He does not tolerate compression hose secondary to discomfort.    In December 2022, he had normal transaminases and normal free T4.  In January 2023, a BMP was performed and revealed a potassium of 4.3 and creatinine of 1.5.  He had a BNP that was normal at 106.    Past Medical History:   Diagnosis Date   • Acute kidney injury (HCC)    • Anemia    • Arthritis     bilateral hands   • Aspiration pneumonia (HCC)    • Atrial fibrillation (HCC)    • Carotid occlusion, bilateral     no intervention per vascular surgery   • Coronary artery disease     remote history of CABG   • Dementia (HCC)    • Depression    • Dysphagia    • GERD (gastroesophageal reflux disease)    • Hyperlipidemia    • Hypertension    • Septic shock (HCC)    • Stroke (HCC)    • Syncope    • Ulcerative colitis (HCC)        Past Surgical History:   Procedure Laterality Date   • CARDIAC ELECTROPHYSIOLOGY PROCEDURE N/A 8/30/2019    Procedure: Pacemaker SC new  [place];  Surgeon: Amaya Rizzo MD;  Location: [facility] INVASIVE LOCATION;  Service: Cardiology   • CARDIAC SURGERY     • GTUBE INSERTION     • KNEE SURGERY Left 1960       Social History     Socioeconomic History   • Marital status: Single   Tobacco Use   • Smoking status: Former     Packs/day: 3.00     Years: 40.00     Pack years: 120.00     Types: Cigarettes   • Smokeless tobacco: Never   Vaping Use   • Vaping Use: Never used   Substance and Sexual Activity   • Alcohol use: No   • Drug use:  Defer   • Sexual activity: Defer       Family History   Family history unknown: Yes       Review of Systems   Unable to perform ROS: dementia       Allergies   Allergen Reactions   • Doxycycline Other (See Comments)         Current Outpatient Medications:   •  acetaminophen (TYLENOL) 500 MG tablet, Take 1 tablet by mouth Every 6 (Six) Hours As Needed for Mild Pain., Disp: , Rfl:   •  amiodarone (PACERONE) 200 MG tablet, Daily., Disp: , Rfl:   •  aspirin 81 MG EC tablet, Take 1 tablet by mouth Daily., Disp: , Rfl:   •  atorvastatin (LIPITOR) 10 MG tablet, Take 1 tablet by mouth Daily., Disp: , Rfl:   •  Benzalkonium Chloride 0.12 % liquid, Apply 1 application topically to the appropriate area as directed., Disp: , Rfl:   •  Calcium Carb-Cholecalciferol (OYSTER SHELL CALCIUM/VITAMIN D) 250-125 MG-UNIT tablet tablet, Take 2 tablets by mouth Daily., Disp: , Rfl:   •  cetirizine (zyrTEC) 10 MG tablet, Take 1 tablet by mouth Daily., Disp: , Rfl:   •  Dimethicone 1.5 % cream, Apply 1 application topically to the appropriate area as directed As Needed., Disp: , Rfl:   •  Divalproex Sodium (DEPAKOTE SPRINKLE) 125 MG capsule, Take 1 capsule by mouth 2 (Two) Times a Day., Disp: , Rfl:   •  fluticasone (FLONASE) 50 MCG/ACT nasal spray, , Disp: , Rfl:   •  furosemide (LASIX) 20 MG tablet, Daily., Disp: , Rfl:   •  metoprolol succinate XL (TOPROL-XL) 50 MG 24 hr tablet, 2 (Two) Times a Day., Disp: , Rfl:   •  montelukast (SINGULAIR) 10 MG tablet, , Disp: , Rfl:   •  pantoprazole (PROTONIX) 40 MG EC tablet, 2 (Two) Times a Day., Disp: , Rfl:   •  potassium chloride ER (K-TAB) 20 MEQ tablet controlled-release ER tablet, Daily., Disp: , Rfl:   •  sertraline (ZOLOFT) 50 MG tablet, Daily., Disp: , Rfl:   •  sulfaSALAzine (AZULFIDINE) 500 MG tablet, Take 1 tablet by mouth 3 (Three) Times a Day., Disp: , Rfl:       Objective:     Vitals:    03/07/23 1033   BP: 110/60   BP Location: Left arm   Pulse: 60   Weight: 104 kg (229 lb)  "  Height: 182.9 cm (72\")     Body mass index is 31.06 kg/m².    Vitals reviewed.   Constitutional:       Appearance: Well-developed.      Comments: In a wheelchair, no distress   Eyes:      Conjunctiva/sclera: Conjunctivae normal.   HENT:      Head: Normocephalic.      Nose: Nose normal.   Neck:      Vascular: No JVD. JVD normal.      Lymphadenopathy: No cervical adenopathy.   Pulmonary:      Effort: Pulmonary effort is normal.      Breath sounds: Normal breath sounds.   Cardiovascular:      Normal rate. Regular rhythm.      Murmurs: There is no murmur.   Pulses:     Intact distal pulses.   Edema:     Ankle: 1+ edema of the left ankle and 2+ edema of the right ankle.     Feet: 1+ edema of the left foot and 2+ edema of the right foot.  Abdominal:      Palpations: Abdomen is soft.      Tenderness: There is no abdominal tenderness.   Musculoskeletal: Normal range of motion.      Cervical back: Normal range of motion. Skin:     General: Skin is warm and dry.      Coloration: Skin is pale.      Findings: No rash.   Neurological:      Mental Status: Oriented to person, place, and time.      Cranial Nerves: No cranial nerve deficit.      Comments: Oriented to self, very vocal (talks about things in the past), pleasant   Psychiatric:         Behavior: Behavior normal.         Thought Content: Thought content normal.         Judgment: Judgment normal.           ECG 12 Lead    Date/Time: 3/7/2023 1:04 PM  Performed by: Maurilio Mai MD  Authorized by: Maurilio Mai MD   Comparison: compared with previous ECG   Similar to previous ECG  Rhythm: paced  Rhythm comments: AP  Conduction: left bundle branch block  QRS axis: normal and left  Other: no other findings    Clinical impression: abnormal EKG              Assessment:       Diagnosis Plan   1. Coronary artery disease involving native coronary artery of native heart without angina pectoris        2. Cardiomyopathy, unspecified type (HCC)        3. Wide-complex tachycardia   "      4. Paroxysmal atrial fibrillation (HCC)        5. Pacemaker        6. Dementia without behavioral disturbance, psychotic disturbance, mood disturbance, or anxiety, unspecified dementia severity, unspecified dementia type (HCC)        7. Essential hypertension               Plan:       He has remote history of coronary disease status post CABG.  He does not seem to be having angina.  He had a wide-complex tachycardia in September 2022 in the setting of sepsis, pneumonia, and acute hypoxemic respiratory failure.  The outside institution felt that an ischemic work-up was not indicated given the severity of his dementia, and I am inclined to agree.    I would keep him on aspirin, atorvastatin, and metoprolol.  From the standpoint of the wide-complex tachycardia, whether it was rapid atrial fibrillation or true VT, amiodarone is reasonable continue for the long-term given his advanced age and poor functional status.  He had normal LFTs and free T4 in December and will need repeat studies in May 2023.  There was a comment made by the cardiology nurse practitioner who saw him in the outpatient setting about upgrading his pacemaker to defibrillator.  I would note that the patient was a DO NOT RESUSCITATE when he was resuscitated, and if no further work-up was felt to be indicated at that time, then an upgrade to an ICD would certainly not be indicated.  I do not feel that this patient's long-term functional status is congruent with placing an ICD and I would not go down this road further.  Also, it has been decided for years that the risks of anticoagulation for this patient's paroxysmal atrial fibrillation outweigh the risks.  As I do not know this patient or his background, I have nothing to base any decision on except to agree with previous practitioners who did know him better.  He does seem to be a high risk patient for adverse events from anticoagulation.    He has significant leg swelling that is greater on the  right, likely due to heart failure and worsening venous insufficiency from his prior saphenous vein harvest.  I have increased furosemide to 40 mg daily.  I do not think he needs additional potassium.  I think the NSAIDs are making things worse so I have stopped his sulfasalazine.    Regarding his cardiomyopathy, again, I would not recommend further work-up.  He is on metoprolol, and I do not think his renal function would tolerate the addition of an ACE/ARB.    We will see him back in 3 months and we will establish him in our device clinic.    Sincerely,       Maurilio Mai MD

## 2023-04-27 ENCOUNTER — HOSPITAL ENCOUNTER (EMERGENCY)
Facility: HOSPITAL | Age: 78
Discharge: HOME | End: 2023-04-27
Attending: EMERGENCY MEDICINE
Payer: MEDICAID

## 2023-04-27 VITALS
WEIGHT: 245.3 LBS | SYSTOLIC BLOOD PRESSURE: 155 MMHG | HEIGHT: 72 IN | TEMPERATURE: 97.5 F | BODY MASS INDEX: 33.23 KG/M2 | RESPIRATION RATE: 16 BRPM | OXYGEN SATURATION: 95 % | HEART RATE: 62 BPM | DIASTOLIC BLOOD PRESSURE: 80 MMHG
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
## 2023-04-27 DIAGNOSIS — S09.92XA INJURY OF NOSE, INITIAL ENCOUNTER: ICD-10-CM

## 2023-04-27 PROCEDURE — 99283 EMERGENCY DEPT VISIT LOW MDM: CPT

## 2023-04-27 NOTE — ED PROVIDER NOTES
Subjective   History of Present Illness  78-year-old male presents via EMS from [facility] for evaluation after he was found laying on the ground.  He is apparently wheelchair-bound and nobody is quite sure how he ended up on the floor.  Patient does not recall either.  Reportedly has history of dementia and can provide little history.  He reportedly complained of pain all over the nursing home staff but upon my evaluation he denies any pain or complaints.  No injuries identified by EMS or triage nursing staff.        Review of Systems   Constitutional: Negative.    Respiratory: Negative.    Cardiovascular: Negative.    Musculoskeletal: Negative for arthralgias, joint swelling and neck pain.   Skin: Negative.    Neurological: Negative for dizziness, syncope and headaches.        Chronic right facial droop       Past Medical History:   Diagnosis Date   • Acute kidney injury    • Anemia    • Arthritis     bilateral hands   • Aspiration pneumonia    • Atrial fibrillation    • Carotid occlusion, bilateral     no intervention per vascular surgery   • Coronary artery disease     remote history of CABG   • Dementia    • Depression    • Dysphagia    • GERD (gastroesophageal reflux disease)    • Hyperlipidemia    • Hypertension    • Septic shock    • Stroke    • Syncope    • Ulcerative colitis        Allergies   Allergen Reactions   • Doxycycline Other (See Comments)       Past Surgical History:   Procedure Laterality Date   • CARDIAC ELECTROPHYSIOLOGY PROCEDURE N/A 8/30/2019    Procedure: [facility];  Surgeon: Amaya Rizzo MD;  Location: [facility] INVASIVE LOCATION;  Service: Cardiology   • CARDIAC SURGERY     • GTUBE INSERTION     • KNEE SURGERY Left 1960       Family History   Family history unknown: Yes       Social History     Socioeconomic History   • Marital status:    Tobacco Use   • Smoking status: Former     Packs/day: 3.00     Years: 40.00     Pack years: 120.00      Types: Cigarettes   • Smokeless tobacco: Never   Vaping Use   • Vaping Use: Never used   Substance and Sexual Activity   • Alcohol use: No   • Drug use: Defer   • Sexual activity: Defer           Objective   Physical Exam  Constitutional:       Comments: Elderly male sleeping comfortably in ER stretcher.  Arouses to voice.   HENT:      Head: Normocephalic and atraumatic.      Mouth/Throat:      Mouth: Mucous membranes are moist.      Pharynx: Oropharynx is clear.   Cardiovascular:      Rate and Rhythm: Normal rate and regular rhythm.   Pulmonary:      Effort: Pulmonary effort is normal. No respiratory distress.   Abdominal:      General: There is no distension.      Palpations: Abdomen is soft.      Tenderness: There is no abdominal tenderness.   Musculoskeletal:         General: No tenderness or signs of injury. Normal range of motion.      Cervical back: Normal range of motion and neck supple. No tenderness.   Skin:     General: Skin is warm and dry.   Neurological:      Mental Status: Mental status is at baseline.   Psychiatric:         Mood and Affect: Mood normal.         Behavior: Behavior normal.         Thought Content: Thought content normal.         Judgment: Judgment normal.         Procedures           ED Course  ED Course as of 04/27/23 0213   u Apr 27, 2023   0212 Patient cannot really provide any history what happened tonight but he is awake, alert, denies any complaint, states he would rather go home and go to bed to be here.  I did not identify any acute injuries on exam and patient has no focal areas of tenderness.  Given his normal vital signs, benign exam, do not feel that further work-up here is indicated.  We will transfer patient back to nursing home. [TD]      ED Course User Index  [TD] Juan Miles MD                                           [facility]    Final diagnoses:   Accidental fall, initial encounter   Injury of nose, initial encounter       ED Disposition  ED Disposition     ED  Disposition   Discharge    Condition   Stable    Comment   --             Mauri Wong MD  [address]  [place] [address]  [phone]    In 1 day  As needed         Medication List      No changes were made to your prescriptions during this visit.          Juan Miles MD  04/27/23 8699

## (undated) DEVICE — INTRO SHEATH PRELUDE SNAP .038 6F 13CM W/SDPRT

## (undated) DEVICE — PENCL E/S HNDSWCH ROCKR CB

## (undated) DEVICE — Device

## (undated) DEVICE — LOU PACE DEFIB: Brand: MEDLINE INDUSTRIES, INC.